# Patient Record
Sex: FEMALE | Race: WHITE | NOT HISPANIC OR LATINO | Employment: FULL TIME | URBAN - METROPOLITAN AREA
[De-identification: names, ages, dates, MRNs, and addresses within clinical notes are randomized per-mention and may not be internally consistent; named-entity substitution may affect disease eponyms.]

---

## 2017-01-31 ENCOUNTER — PRE VISIT (OUTPATIENT)
Dept: UROLOGY | Facility: CLINIC | Age: 52
End: 2017-01-31

## 2017-03-23 ENCOUNTER — OFFICE VISIT (OUTPATIENT)
Dept: UROLOGY | Facility: CLINIC | Age: 52
End: 2017-03-23

## 2017-03-23 VITALS
WEIGHT: 183 LBS | HEART RATE: 83 BPM | SYSTOLIC BLOOD PRESSURE: 120 MMHG | HEIGHT: 64 IN | BODY MASS INDEX: 31.24 KG/M2 | DIASTOLIC BLOOD PRESSURE: 70 MMHG

## 2017-03-23 DIAGNOSIS — N39.46 MIXED INCONTINENCE: Primary | ICD-10-CM

## 2017-03-23 DIAGNOSIS — M62.89 PELVIC FLOOR DYSFUNCTION: ICD-10-CM

## 2017-03-23 ASSESSMENT — PAIN SCALES - GENERAL: PAINLEVEL: NO PAIN (0)

## 2017-03-23 NOTE — LETTER
"3/23/2017       RE: Jena Kamara  [address] [facility] [address]     Dear Colleague,    Thank you for referring your patient, Jena Kamara, to the [facility] UROLOGY AND INST FOR PROSTATE AND UROLOGIC CANCERS at [facility]. Please see a copy of my visit note below.    March 23, 2017    Return visit    Patient returns today for follow up after PT.  She states that there is 75% improvement after the physical therapy.  Pessary does work well when she uses it.  She is really considering a more durable option.  She denies any changes in her health since last visit.    /70  Pulse 83  Ht 1.626 m (5' 4\")  Wt 83 kg (183 lb)  BMI 31.41 kg/m2  She is comfortable, in no distress, non-labored breathing.  Abdomen is soft, non-tender, non-distended.  Normal external female genitalia.  Negative CST.  Speculum and bimanual exam are remarkable for mild myofascial tenderness of the pelvic floor.    A/P: 51 year old F with mixed incontinence s/p PFPT who has some persistent bothersome TRISTAN     We again discussed that there are multiple options for managing her TRISTAN and she is now more interested in surgical intervention. As unable to demonstrate TRISTAN on exam and she is interested in TRISTAN surgery will have her undergo UDS    RTC after UDS for further discussion, sooner if needed    15 minutes were spent with the patient today, > 50% in counseling and coordination of care    Katarina Jaramillo MD MPH   of Urology    CC  Patient Care Team:  [facility] Benavides MD as PCP - General  Reanna Corea PA as Physician Assistant (Physician Assistant)  Bee Madison as Referring Physician (Nurse Practitioner)  Katarina Jaramillo MD as MD (Urology)  REANNA COREA      "

## 2017-03-23 NOTE — PROGRESS NOTES
"March 23, 2017    Return visit    Patient returns today for follow up after PT.  She states that there is 75% improvement after the physical therapy.  Pessary does work well when she uses it.  She is really considering a more durable option.  She denies any changes in her health since last visit.    /70  Pulse 83  Ht 1.626 m (5' 4\")  Wt 83 kg (183 lb)  BMI 31.41 kg/m2  She is comfortable, in no distress, non-labored breathing.  Abdomen is soft, non-tender, non-distended.  Normal external female genitalia.  Negative CST.  Speculum and bimanual exam are remarkable for mild myofascial tenderness of the pelvic floor.    A/P: 51 year old F with mixed incontinence s/p PFPT who has some persistent bothersome TRISTAN     We again discussed that there are multiple options for managing her TRISTAN and she is now more interested in surgical intervention. As unable to demonstrate TRISTAN on exam and she is interested in TRISTAN surgery will have her undergo UDS    RTC after UDS for further discussion, sooner if needed    15 minutes were spent with the patient today, > 50% in counseling and coordination of care    Katarina Jaramillo MD MPH   of Urology    CC  Patient Care Team:  [facility] Md Benavides MD as PCP - General  Reanna Corea PA as Physician Assistant (Physician Assistant)  Bee Madison as Referring Physician (Nurse Practitioner)  Katarina Jaramillo MD as MD (Urology)  REANNA COREA        "

## 2017-03-23 NOTE — MR AVS SNAPSHOT
After Visit Summary   3/23/2017    Jena Kamara    MRN: [id number]           Patient Information     Date Of Birth          1965        Visit Information        Provider Department      3/23/2017 3:45 PM Katarina Jaramillo MD [facility] and [facility] Prostate and Urologic Cancers        Care Instructions    Please do the bladder testing and then return to see us        Follow-ups after your visit        Your next 10 appointments already scheduled     Apr 13, 2017  3:30 PM CDT   (Arrive by 3:15 PM)   Urodynamics with  Uro Procedure Nurse   [facility] and [facility] Urologic C[facility] ([facility])    [address] [place] [address] [phone]            Apr 27, 2017  3:30 PM CDT   (Arrive by 3:15 PM)   Return Visit with Katarina Jaramillo MD   [facility] and [facility] Urologic Cancers ([facility])    [address] [place] [address] [phone]              Who to contact     Please call your clinic at [phone] to:    Ask questions about your health    Make or cancel appointments    Discuss your medicines    Learn about your test results    Speak to your doctor   If you have compliments or concerns about an experience at your clinic, or if you wish to file a complaint, please contact [facility] Patient Relations at [phone] or email us at [email or web address]         Additional Information About Your Visit        Lockboxhart Information     DermLinkt gives you secure access to your electronic health record. If you see a primary care provider, you can also send messages to your care team and make appointments. If you have questions, please call your primary care clinic.  If you do not have a primary care provider, please call [phone] and they will assist you.      [employer] is an electronic gateway that  "provides easy, online access to your medical records. With [employer], you can request a clinic appointment, read your test results, renew a prescription or communicate with your care team.     To access your existing account, please contact your [facility] or call [phone] for assistance.        Care EveryWhere ID     This is your Care EveryWhere ID. This could be used by other organizations to access your [place] medical records  [id number]        Your Vitals Were     Pulse Height BMI (Body Mass Index)             83 1.626 m (5' 4\") 31.41 kg/m2          Blood Pressure from Last 3 Encounters:   03/23/17 120/70   06/16/16 148/87   03/03/16 (!) 160/97    Weight from Last 3 Encounters:   03/23/17 83 kg (183 lb)   06/16/16 82 kg (180 lb 11.2 oz)   03/03/16 81.3 kg (179 lb 3.2 oz)              Today, you had the following     No orders found for display       Primary Care Provider Office Phone # Fax #    [facility] MD Makayla [phone]       [address] [place] [address]        Thank you!     Thank you for choosing [facility] UROLOGY AND [facility] FOR PROSTATE AND UROLOGIC CANCERS  for your care. Our goal is always to provide you with excellent care. Hearing back from our patients is one way we can continue to improve our services. Please take a few minutes to complete the written survey that you may receive in the mail after your visit with us. Thank you!             Your Updated Medication List - Protect others around you: Learn how to safely use, store and throw away your medicines at www.disposemymeds.org.          This list is accurate as of: 3/23/17  4:39 PM.  Always use your most recent med list.                   Brand Name Dispense Instructions for use    CALCIUM + D PO      1 TABLET DAILY       CLARITIN 10 MG tablet   Generic drug:  loratadine      1 TABLET DAILY PRN       fluticasone 50 MCG/ACT spray    FLONASE    1 Package    Spray 2 sprays into both " nostrils daily. Prn       hydrochlorothiazide 25 MG tablet    HYDRODIURIL    90 tablet    Take 1 tablet (25 mg) by mouth daily Due for an appt with Dr Lee,needs labs       Multiple vitamin Tabs      1 TABLET DAILY

## 2017-04-11 ENCOUNTER — PRE VISIT (OUTPATIENT)
Dept: UROLOGY | Facility: CLINIC | Age: 52
End: 2017-04-11

## 2017-04-13 ENCOUNTER — OFFICE VISIT (OUTPATIENT)
Dept: UROLOGY | Facility: CLINIC | Age: 52
End: 2017-04-13

## 2017-04-13 DIAGNOSIS — R32 URINARY INCONTINENCE: Primary | ICD-10-CM

## 2017-04-13 LAB
APPEARANCE UR: CLEAR
BILIRUB UR QL: NORMAL
COLOR UR: YELLOW
GLUCOSE URINE: NORMAL MG/DL
HGB UR QL: NORMAL
KETONES UR QL: NORMAL MG/DL
LEUKOCYTE ESTERASE URINE: NORMAL
NITRITE UR QL STRIP: NORMAL
PH UR STRIP: 5 PH (ref 5–7)
PROTEIN ALBUMIN URINE: NORMAL MG/DL
SOURCE: NORMAL
SP GR UR STRIP: 1.01 (ref 1–1.03)
UROBILINOGEN UR QL STRIP: 0.2 EU/DL (ref 0.2–1)

## 2017-04-13 ASSESSMENT — PAIN SCALES - GENERAL: PAINLEVEL: NO PAIN (0)

## 2017-04-13 NOTE — MR AVS SNAPSHOT
After Visit Summary   4/13/2017    Jena Kamara    MRN: [id number]           Patient Information     Date Of Birth          1965        Visit Information        Provider Department      4/13/2017 3:30 PM Nurse, Uc Uro Procedure [facility] Urology and [facility] for Prostate and Urologic Cancers        Today's Diagnoses     Urinary incontinence    -  1       Follow-ups after your visit        Your next 10 appointments already scheduled     Apr 27, 2017  3:30 PM CDT   (Arrive by 3:15 PM)   Return Visit with Katarina Jaramillo MD   [facility] Urology and [facility] for Prostate and Urologic Cancers ([facility])    [address]  [facility] [address] [phone]              Who to contact     Please call your clinic at [phone] to:    Ask questions about your health    Make or cancel appointments    Discuss your medicines    Learn about your test results    Speak to your doctor   If you have compliments or concerns about an experience at your clinic, or if you wish to file a complaint, please contact [facility] Patient Relations at [phone] or email us at [email or web address]         Additional Information About Your Visit        MyChart Information     Xueersit gives you secure access to your electronic health record. If you see a primary care provider, you can also send messages to your care team and make appointments. If you have questions, please call your primary care clinic.  If you do not have a primary care provider, please call [phone] and they will assist you.      CampusTap is an electronic gateway that provides easy, online access to your medical records. With CampusT[employer], you can request a clinic appointment, read your test results, renew a prescription or communicate with your care team.     To access your existing account, please contact your [facility] or call [phone]  "for assistance.        Care EveryWhere ID     This is your Care EveryWhere ID. This could be used by other organizations to access your [place] medical records  [id number]        Your Vitals Were     Pulse Height BMI (Body Mass Index)             74 1.626 m (5' 4\") 31.22 kg/m2          Blood Pressure from Last 3 Encounters:   04/13/17 (!) 176/102   03/23/17 120/70   06/16/16 148/87    Weight from Last 3 Encounters:   04/13/17 82.5 kg (181 lb 14.4 oz)   03/23/17 83 kg (183 lb)   06/16/16 82 kg (180 lb 11.2 oz)              We Performed the Following     COMPLEX UROFLOWMETRY     CYSTOMETROGRAM COMPLEX     EMG ANAL/URETH SPHINCTER, OTHER THAN NEEDLE     PROCEDURE OTHER - HIM SCAN     Urinalysis chemical screen POCT        Primary Care Provider Office Phone # Fax #    [facility] MD Makayla [phone]       [address] [place] [address]        Thank you!     Thank you for choosing [facility] UROLOGY AND [facility] FOR PROSTATE AND UROLOGIC CANCERS  for your care. Our goal is always to provide you with excellent care. Hearing back from our patients is one way we can continue to improve our services. Please take a few minutes to complete the written survey that you may receive in the mail after your visit with us. Thank you!             Your Updated Medication List - Protect others around you: Learn how to safely use, store and throw away your medicines at www.disposemymeds.org.          This list is accurate as of: 4/13/17 11:59 PM.  Always use your most recent med list.                   Brand Name Dispense Instructions for use    CALCIUM + D PO      1 TABLET DAILY       CLARITIN 10 MG tablet   Generic drug:  loratadine      1 TABLET DAILY PRN       fluticasone 50 MCG/ACT spray    FLONASE    1 Package    Spray 2 sprays into both nostrils daily. Prn       hydrochlorothiazide 25 MG tablet    HYDRODIURIL    90 tablet    Take 1 tablet (25 mg) by mouth daily Due for an appt with Dr Lee,needs labs       " Multiple vitamin Tabs      1 TABLET DAILY

## 2017-04-18 ENCOUNTER — DOCUMENTATION ONLY (OUTPATIENT)
Dept: UROLOGY | Facility: CLINIC | Age: 52
End: 2017-04-18

## 2017-04-18 VITALS
HEART RATE: 74 BPM | WEIGHT: 181.9 LBS | BODY MASS INDEX: 31.05 KG/M2 | DIASTOLIC BLOOD PRESSURE: 102 MMHG | HEIGHT: 64 IN | SYSTOLIC BLOOD PRESSURE: 176 MMHG

## 2017-04-18 NOTE — PROGRESS NOTES
Jena Kamara comes into clinic today at the request of Dr. Jaramillo Ordering Provider for UDS.    This service provided today was under the supervising provider of the day Dr. Mills, who was available if needed.    Constantin Landrum    INDICATIONS FOR PROCEDURE:  Ms. Jena Kamara is a pleasant 51 year old female with mixed incontinence.     PROCEDURE:    1.Complex filling cystourethrogram with measurement of bladder and rectal pressures.  2.Complex voiding cystourethrogram with measurement of bladder and rectal pressures.  3.Electromyography during urodynamics.  4.Uroflowmetry.  5.Sterile urethral catheterization for measurement of postvoid residual urine volume.    VOIDING DIARY:  Voids q 2-6 hours  Average voided amount:240  Episodes of incontinence: 1  Incontinence associated with: walking  Total Volume Intake: 1360 ml  Total Volume Output: 1922 ml    DESCRIPTION OF PROCEDURE:  Risks, benefits, and alternatives were discussed with the patient and they wished to proceed. Urodynamics is planned to better assess the primary etiology for Ms. Kamara's urologic dysfunction. After informed consent was obtained, the patient was taken to the procedure room where uroflowmetry was performed. Findings below. Next a triple-lumen urodynamics catheter was inserted in the bladder. A rectal manometry catheter was placed in the rectum. EMG pads were placed on either side of the anal verge. The bladder was filled with sterile saline at 25 cc/minute and serial pressures were recorded.     PROCEDURE NOTES: 50 ml urine catheterized pre-test.  Urine dipstick was negative for nitrites and leukocytes.  Patient was unable to void with catheters in; they were removed and she was sent to uroflow to void.  No incontinence demonstrated during the study.    UROFLOW:  Voided volume: 164 ml  Maximum flow rate: 23.9 ml/sec  Average flow rate: 12.2 ml/sec  Post void Residual by catheter: 50 ml    DURING THE FILLING PHASE:  At the following volumes the  patient experienced:  First sensation: not reported   First Desire: 229 ml  Strong Desire: 498 ml  Maximum Capacity: 636 ml       DURING THE VOIDING PHASE:  Did not void until sensors out. See below    POST FILLING UROFLOW:  Voided volume: 599 ml  Maximum flow rate: 40.9 ml/sec  Average flow rate: 28.9 ml/sec    Total Instilled: 638  Total Void: 599  Final PVR: 85        A single ciprofloxacin antibiotic was provided for UTI prophylaxis per protocol, following completion of study.         Thank you for allowing me to participate in the care of  Ms. Jenaele Kellerley.  Constantin Landrum LPN

## 2017-04-25 ENCOUNTER — PRE VISIT (OUTPATIENT)
Dept: UROLOGY | Facility: CLINIC | Age: 52
End: 2017-04-25

## 2017-04-25 NOTE — TELEPHONE ENCOUNTER
Patient with a history of stress urinary incontinence and pessary use coming in to review UDS. Chart reviewed and all records available. No need for a call.

## 2017-04-27 ENCOUNTER — OFFICE VISIT (OUTPATIENT)
Dept: UROLOGY | Facility: CLINIC | Age: 52
End: 2017-04-27

## 2017-04-27 VITALS
WEIGHT: 181.9 LBS | BODY MASS INDEX: 31.05 KG/M2 | HEIGHT: 64 IN | HEART RATE: 68 BPM | SYSTOLIC BLOOD PRESSURE: 169 MMHG | DIASTOLIC BLOOD PRESSURE: 97 MMHG

## 2017-04-27 DIAGNOSIS — N39.46 MIXED INCONTINENCE: Primary | ICD-10-CM

## 2017-04-27 ASSESSMENT — PAIN SCALES - GENERAL: PAINLEVEL: NO PAIN (0)

## 2017-04-27 NOTE — NURSING NOTE
"Chief Complaint   Patient presents with     RECHECK     Reviewing UDS for incontinence       Blood pressure (!) 169/97, pulse 68, height 1.626 m (5' 4\"), weight 82.5 kg (181 lb 14.4 oz). Body mass index is 31.22 kg/(m^2).    Patient Active Problem List   Diagnosis     Angioedema     Allergic rhinitis     ROSACEA     Stress fracture of tibia or fibula     Hypertension goal BP (blood pressure) < 140/90     Hyperlipidemia LDL goal <130     HCH       Allergies   Allergen Reactions     Penicillins        Current Outpatient Prescriptions   Medication Sig Dispense Refill     fluticasone (FLONASE) 50 MCG/ACT nasal spray Spray 2 sprays into both nostrils daily. Prn   1 Package 11     MULTIPLE VITAMIN OR TABS 1 TABLET DAILY       CALCIUM + D OR 1 TABLET DAILY       CLARITIN 10 MG OR TABS 1 TABLET DAILY PRN         Social History   Substance Use Topics     Smoking status: Never Smoker     Smokeless tobacco: Never Used     Alcohol use Yes      Comment: 1 drink per month       NATALIE Figueroa  4/27/2017  3:35 PM         "

## 2017-04-27 NOTE — MR AVS SNAPSHOT
After Visit Summary   4/27/2017    Jena Kamara    MRN: [id number]           Patient Information     Date Of Birth          1965        Visit Information        Provider Department      4/27/2017 3:30 PM Katarina Jaramillo MD [facility] Urology and [facility] for Prostate and Urologic Cancers        Today's Diagnoses     Mixed incontinence    -  1      Care Instructions    Continue the pelvic floor exercises as needed    Continue to use the pessary are needed    Return to see us in one year, sooner if needed        Follow-ups after your visit        Who to contact     Please call your clinic at [phone] to:    Ask questions about your health    Make or cancel appointments    Discuss your medicines    Learn about your test results    Speak to your doctor   If you have compliments or concerns about an experience at your clinic, or if you wish to file a complaint, please contact [facility] Patient Relations at [phone] or email us at [email or web address]         Additional Information About Your Visit        Koibanxhart Information     Rohati Systemst gives you secure access to your electronic health record. If you see a primary care provider, you can also send messages to your care team and make appointments. If you have questions, please call your primary care clinic.  If you do not have a primary care provider, please call [phone] and they will assist you.      [employer] is an electronic gateway that provides easy, online access to your medical records. With [employer], you can request a clinic appointment, read your test results, renew a prescription or communicate with your care team.     To access your existing account, please contact your [facility] or call [phone] for assistance.        Care EveryWhere ID     This is your Care EveryWhere ID. This could be used by other organizations to access your [employer]  "records  [id number]        Your Vitals Were     Pulse Height BMI (Body Mass Index)             68 1.626 m (5' 4\") 31.22 kg/m2          Blood Pressure from Last 3 Encounters:   04/27/17 (!) 169/97   04/13/17 (!) 176/102   03/23/17 120/70    Weight from Last 3 Encounters:   04/27/17 82.5 kg (181 lb 14.4 oz)   04/13/17 82.5 kg (181 lb 14.4 oz)   03/23/17 83 kg (183 lb)              Today, you had the following     No orders found for display       Primary Care Provider Office Phone # Fax #    [facility] [phone]       [address] [place] [address]        Thank you!     Thank you for choosing [facility] UROLOGY AND [facility] FOR PROSTATE AND UROLOGIC CANCERS  for your care. Our goal is always to provide you with excellent care. Hearing back from our patients is one way we can continue to improve our services. Please take a few minutes to complete the written survey that you may receive in the mail after your visit with us. Thank you!             Your Updated Medication List - Protect others around you: Learn how to safely use, store and throw away your medicines at www.disposemymeds.org.          This list is accurate as of: 4/27/17 11:59 PM.  Always use your most recent med list.                   Brand Name Dispense Instructions for use    CALCIUM + D PO      1 TABLET DAILY       CLARITIN 10 MG tablet   Generic drug:  loratadine      1 TABLET DAILY PRN       fluticasone 50 MCG/ACT spray    FLONASE    1 Package    Spray 2 sprays into both nostrils daily. Prn       Multiple vitamin Tabs      1 TABLET DAILY         "

## 2017-04-27 NOTE — PROGRESS NOTES
"April 27, 2017    Return visit    Patient returns today for follow up.  States that her symptoms of leakage may actually a little better now than when she last saw me.  Still doing her exercises and using her pessary as needed. She denies any changes in her health since last visit.    BP (!) 169/97  Pulse 68  Ht 1.626 m (5' 4\")  Wt 82.5 kg (181 lb 14.4 oz)  BMI 31.22 kg/m2  She is comfortable, in no distress, non-labored breathing.      Urodynamics reviewed:  First desire at 229mL, strong desire at 498mL, residential 636mL.  She has normal bladder compliance.  She does demonstrate any urodynamic stress incontinence, detrusor overactivity, or detrusor overactivity incontinence.  She was unable to void with the sensors in place but after they were removed she was able to urinate with a good flow (Qmax 40.9, Qave 28.9) and PVR 85mL    A/P: 51 year old F with with mixed incontinence s/p PFPT who has some persistent bothersome TRISTAN     Discussed that as at this time we have not been able to demonstrate TRISTAN on exam or USI on urodynamics, that we would need to find a way to demonstrate transurethral urine loss before heading to the OR for any definitive sling surgery    At this time patient states that the symptoms are better and she is looking forward to having her first summer off in many years so she will continue with her pessary as needed    She will return to see us in one year, sooner if needed    10 minutes were spent with the patient today, > 50% in counseling and coordination of care    Katarina Jaramillo MD MPH   of Urology    CC  Patient Care Team:  [facility] Md Makayla, MD as PCP - General  Reanna Corea PA as Physician Assistant (Physician Assistant)  Bee Madison as Referring Physician (Nurse Practitioner)  Katarina Jaramillo MD as MD (Urology)  ESTABLISHED PATIENT              "

## 2017-04-27 NOTE — PATIENT INSTRUCTIONS
Continue the pelvic floor exercises as needed    Continue to use the pessary are needed    Return to see us in one year, sooner if needed

## 2017-04-27 NOTE — LETTER
"4/27/2017       RE: Jena Kamara  [address] [facility] [address]     Dear Colleague,    Thank you for referring your patient, Jena Kamara, to the [facility] UROLOGY AND INST FOR PROSTATE AND UROLOGIC CANCERS at [facility]. Please see a copy of my visit note below.    April 27, 2017    Return visit    Patient returns today for follow up.  States that her symptoms of leakage may actually a little better now than when she last saw me.  Still doing her exercises and using her pessary as needed. She denies any changes in her health since last visit.    BP (!) 169/97  Pulse 68  Ht 1.626 m (5' 4\")  Wt 82.5 kg (181 lb 14.4 oz)  BMI 31.22 kg/m2  She is comfortable, in no distress, non-labored breathing.      Urodynamics reviewed:  First desire at 229mL, strong desire at 498mL, long term 636mL.  She has normal bladder compliance.  She does demonstrate any urodynamic stress incontinence, detrusor overactivity, or detrusor overactivity incontinence.  She was unable to void with the sensors in place but after they were removed she was able to urinate with a good flow (Qmax 40.9, Qave 28.9) and PVR 85mL    A/P: 51 year old F with with mixed incontinence s/p PFPT who has some persistent bothersome TRISTAN     Discussed that as at this time we have not been able to demonstrate TRISTAN on exam or USI on urodynamics, that we would need to find a way to demonstrate transurethral urine loss before heading to the OR for any definitive sling surgery    At this time patient states that the symptoms are better and she is looking forward to having her first summer off in many years so she will continue with her pessary as needed    She will return to see us in one year, sooner if needed    10 minutes were spent with the patient today, > 50% in counseling and coordination of care    Katarina Jaramillo MD MPH   of Urology    CC  Patient Care Team:  [facility] Md Makayla, MD as PCP - " General  Reanna Corea PA as Physician Assistant (Physician Assistant)  Bee Madison as Referring Physician (Nurse Practitioner)  Katarina Jaramillo MD as MD (Urology)  ESTABLISHED PATIENT

## 2017-07-15 ENCOUNTER — HEALTH MAINTENANCE LETTER (OUTPATIENT)
Age: 52
End: 2017-07-15

## 2017-10-25 ENCOUNTER — OFFICE VISIT (OUTPATIENT)
Dept: FAMILY MEDICINE | Facility: CLINIC | Age: 52
End: 2017-10-25
Payer: COMMERCIAL

## 2017-10-25 VITALS
WEIGHT: 176.2 LBS | OXYGEN SATURATION: 99 % | DIASTOLIC BLOOD PRESSURE: 87 MMHG | HEIGHT: 64 IN | TEMPERATURE: 97.7 F | BODY MASS INDEX: 30.08 KG/M2 | HEART RATE: 70 BPM | SYSTOLIC BLOOD PRESSURE: 169 MMHG

## 2017-10-25 DIAGNOSIS — I10 HYPERTENSION GOAL BP (BLOOD PRESSURE) < 140/90: Primary | ICD-10-CM

## 2017-10-25 PROCEDURE — 99213 OFFICE O/P EST LOW 20 MIN: CPT | Performed by: PHYSICIAN ASSISTANT

## 2017-10-25 RX ORDER — HYDROCHLOROTHIAZIDE 12.5 MG/1
12.5 CAPSULE ORAL DAILY
COMMUNITY
End: 2017-11-24 | Stop reason: ALTCHOICE

## 2017-10-25 NOTE — NURSING NOTE
"Chief Complaint   Patient presents with     Hypertension       Initial LMP 02/24/2017 (Exact Date) Estimated body mass index is 23.49 kg/(m^2) as calculated from the following:    Height as of 4/4/17: 5' 7\" (1.702 m).    Weight as of 4/4/17: 150 lb (68 kg).  Medication Reconciliation: complete     Hanane Pina CMA   "

## 2017-10-25 NOTE — MR AVS SNAPSHOT
After Visit Summary   10/25/2017    Jena Kamara    MRN: [id number]           Patient Information     Date Of Birth          1965        Visit Information        Provider Department      10/25/2017 3:40 PM Dennise Terrazas PA-C [facility] Hypertension Study Summary     Thank you for your interest in the [facility] hypertension research study. This study is designed to test whether genetically guided blood pressure medication management is better than the standard of care.  Both groups will use medications that have been used for many years to treat high blood pressure ( diuretics, beta blockers, calcium channel blockers, AceI /ARB).  The ORDER of medications chosen may be different however. All participants will receive the genetic testing for free along with free research related visits.  Participants will not be given the results of their genetic test results until the END of the study.Participants will also receive compensation totaling about $100 for completing all study visits and surveys.      If you would like to enroll or know more about using your genetics to determine which hypertensive medications may work best for you please contact the research coordinator as [phone] or email [email or web address]    Who may qualify for the study:  1) New diagnosis of high blood pressure but not yet on blood pressure medication.  2) Existing diagnosis of hypertension but blood pressure is  uncontrolled with 1 or less class of medications.   3) Age between 30 and 70  4) BMI between 19-50    Who should NOT be in the study:    1) All patient taking more than 1 class of hypertensive medication are excluded.   2) Documented instance of following conditions    A. Cardiac Disease  i. ICD-10 Code for Coronary Artery Disease - CAD: 410.* -414.*, I25.*  ii. ICD-10 Code for Heart Failure - 428.*, I50.*  iii. ICD-10 Code for Congenital Cardiac Disease -  745.*, -747.*, Q20.*, -Q28.*   B. Kidney Disease        i. ICD-10 Code for Chronic Kidney Disease - CKD:ICD9 585.*and ICD10 N18.*   C. Vascular Disease        i. ICD-10 Code for Peripheral Vascular Disease - 443.9, I73.9        ii. ICD-10 Code for Pulmonary Hypertension - 416.0, 416.8, I27.0, I27.2   D. Secondary Hypertension                     i. ICD-10 Code for Hypertension Secondary to Other Diseases - I15.0-2, I15.8-9  3) Any patient who has chronic medical conditions that  their doctor/provider feels would make them unsafe to participate in a research study where initial choice of blood pressure  medication could be from 1 of these 4 BP classes of medicines: diuretics, beta blockers, calcium channel blockers, AceI /ARB.    Study visit occur at the following clinic locations  North:  1) [place]  2) [place]  3) [place]  4) [place]  5) [place]  6) [place]  7) [place]  8) [place]    South:  1) [facility])  2) [employer]  [facility]) [facility]  1) [place]    Patient Expectations:    1) Take Hypertension medications  2) Attend scheduled study visits  3) Complete online surveys sent between visits     If enrolled patient is asked to attend 5 to 8 study visits over the next 12 months.                    Follow-ups after your visit        Who to contact     Normal or non-critical lab and imaging results will be communicated to you by [facility], letter or phone within 4 business days after the clinic has received the results. If you do not hear from us within 7 days, please contact the clinic through MyChart or phone. If you have a critical or abnormal lab result, we will notify you by phone as soon as possible.  Submit refill requests through [employer] or call your pharmacy and they will forward the refill request to us. Please allow 3 business days for your refill to be completed.          If you need to speak with a  for additional information , please call:  "[phone]           Additional Information About Your Visit        MyChart Information     [facility] [employer] gives you secure access to your electronic health record. If you see a primary care provider, you can also send messages to your care team and make appointments. If you have questions, please call your primary care clinic.  If you do not have a primary care provider, please call [phone] and they will assist you.        Care EveryWhere ID     This is your Care EveryWhere ID. This could be used by other organizations to access your [place] medical records  [id number]        Your Vitals Were     Pulse Temperature Height Last Period Pulse Oximetry BMI (Body Mass Index)    70 97.7  F (36.5  C) (Tympanic) 5' 4\" (1.626 m) 10/20/2017 99% 30.24 kg/m2       Blood Pressure from Last 3 Encounters:   10/25/17 169/87   04/27/17 (!) 169/97   04/13/17 (!) 176/102    Weight from Last 3 Encounters:   10/25/17 176 lb 3.2 oz (79.9 kg)   04/27/17 181 lb 14.4 oz (82.5 kg)   04/13/17 181 lb 14.4 oz (82.5 kg)              Today, you had the following     No orders found for display       Primary Care Provider Office Phone # Fax #    [facility] [phone] [address] [facility] [address]        Equal Access to Services     ART SOLORZANO AH: Hadii ivet ku hadasho Soomaali, waaxda luqadaha, qaybta kaalmada adeegyada, pascale castillo. So [facility] [phone].    ATENCIÓN: Si habla español, tiene a hickman disposición servicios gratuitos de asistencia lingüística. Llame al [phone].    We comply with applicable federal civil rights laws and [place] laws. We do not discriminate on the basis of race, color, national origin, age, disability, sex, sexual orientation, or gender identity.            Thank you!     Thank you for choosing [facility]  for your care. Our goal is always to provide you with excellent care. Hearing back from our patients is one way we " can continue to improve our services. Please take a few minutes to complete the written survey that you may receive in the mail after your visit with us. Thank you!             Your Updated Medication List - Protect others around you: Learn how to safely use, store and throw away your medicines at www.disposemymeds.org.          This list is accurate as of: 10/25/17  4:15 PM.  Always use your most recent med list.                   Brand Name Dispense Instructions for use Diagnosis    CALCIUM + D PO      1 TABLET DAILY        CLARITIN 10 MG tablet   Generic drug:  loratadine      1 TABLET DAILY PRN        fluticasone 50 MCG/ACT spray    FLONASE    1 Package    Spray 2 sprays into both nostrils daily. Prn    Allergic rhinitis, cause unspecified       hydrochlorothiazide 12.5 MG capsule    MICROZIDE     Take 12.5 mg by mouth daily        Multiple vitamin Tabs      1 TABLET DAILY

## 2017-10-25 NOTE — PATIENT INSTRUCTIONS
[facility] Hypertension Study Summary     Thank you for your interest in the [facility] hypertension research study. This study is designed to test whether genetically guided blood pressure medication management is better than the standard of care.  Both groups will use medications that have been used for many years to treat high blood pressure ( diuretics, beta blockers, calcium channel blockers, AceI /ARB).  The ORDER of medications chosen may be different however. All participants will receive the genetic testing for free along with free research related visits.  Participants will not be given the results of their genetic test results until the END of the study.Participants will also receive compensation totaling about $100 for completing all study visits and surveys.      If you would like to enroll or know more about using your genetics to determine which hypertensive medications may work best for you please contact the research coordinator as [phone] or email [email or web address]    Who may qualify for the study:  1) New diagnosis of high blood pressure but not yet on blood pressure medication.  2) Existing diagnosis of hypertension but blood pressure is  uncontrolled with 1 or less class of medications.   3) Age between 30 and 70  4) BMI between 19-50    Who should NOT be in the study:    1) All patient taking more than 1 class of hypertensive medication are excluded.   2) Documented instance of following conditions    A. Cardiac Disease  i. ICD-10 Code for Coronary Artery Disease - CAD: 410.* -414.*, I25.*  ii. ICD-10 Code for Heart Failure - 428.*, I50.*  iii. ICD-10 Code for Congenital Cardiac Disease - 745.*, -747.*, Q20.*, -Q28.*   B. Kidney Disease        i. ICD-10 Code for Chronic Kidney Disease - CKD:ICD9 585.*and ICD10 N18.*   C. Vascular Disease        i. ICD-10 Code for Peripheral Vascular Disease - 443.9, I73.9        ii. ICD-10 Code for Pulmonary Hypertension - 416.0, 416.8, I27.0, I27.2   D.  Secondary Hypertension                     i. ICD-10 Code for Hypertension Secondary to Other Diseases - I15.0-2, I15.8-9  3) Any patient who has chronic medical conditions that  their doctor/provider feels would make them unsafe to participate in a research study where initial choice of blood pressure  medication could be from 1 of these 4 BP classes of medicines: diuretics, beta blockers, calcium channel blockers, AceI /ARB.    Study visit occur at the following clinic locations  North:  1) [place]  2) [employer]  3) [place]  4) [place]  5) [place]  6) [place]  7) [place]  8) [place]    [place]:  1) [facility])  2) [place]  [facility]  1) [place]    Patient Expectations:    1) Take Hypertension medications  2) Attend scheduled study visits  3) Complete online surveys sent between visits     If enrolled patient is asked to attend 5 to 8 study visits over the next 12 months.

## 2017-10-25 NOTE — PROGRESS NOTES
SUBJECTIVE:   Jena Kamara is a 52 year old female who presents to clinic today for the following health issues:    Hypertension Follow-up      Outpatient blood pressures are being checked at home.  Results are 116/73 - 168/113.    Low Salt Diet: no added salt    Amount of exercise or physical activity: 6-7 days/week for an average of greater than 60 minutes    Problems taking medications regularly: No    Medication side effects: none    Diet: regular (no restrictions)      She denies any chest pain or SOB.  No changes in vision.  No headache (has some head congestion).     Problem list and histories reviewed & adjusted, as indicated.  Additional history: as documented    Patient Active Problem List   Diagnosis     Angioedema     Allergic rhinitis     ROSACEA     Stress fracture of tibia or fibula     Hypertension goal BP (blood pressure) < 140/90     Hyperlipidemia LDL goal <130     HCH     Past Surgical History:   Procedure Laterality Date     SURGICAL HISTORY OF -       Sinus Surgery     SURGICAL HISTORY OF -              Social History   Substance Use Topics     Smoking status: Never Smoker     Smokeless tobacco: Never Used     Alcohol use Yes      Comment: 1 drink per month     Family History   Problem Relation Age of Onset     CEREBROVASCULAR DISEASE Mother      age 49     Hypertension Mother      Lipids Father      Respiratory Father      COPD     Eye Disorder Father      Alzheimer Disease Father      age 76     Depression Maternal Grandmother      suicide     CEREBROVASCULAR DISEASE Maternal Grandfather      Respiratory Paternal Grandfather      Eye Disorder Sister      optical atrophy     DIABETES No family hx of      C.A.D. No family hx of      Breast Cancer No family hx of      Cancer - colorectal No family hx of      Prostate Cancer No family hx of          Current Outpatient Prescriptions   Medication Sig Dispense Refill     hydrochlorothiazide (MICROZIDE) 12.5 MG capsule Take 12.5  "mg by mouth daily       fluticasone (FLONASE) 50 MCG/ACT nasal spray Spray 2 sprays into both nostrils daily. Prn   1 Package 11     MULTIPLE VITAMIN OR TABS 1 TABLET DAILY       CALCIUM + D OR 1 TABLET DAILY       CLARITIN 10 MG OR TABS 1 TABLET DAILY PRN       BP Readings from Last 3 Encounters:   10/25/17 169/87   04/27/17 (!) 169/97   04/13/17 (!) 176/102    Wt Readings from Last 3 Encounters:   10/25/17 176 lb 3.2 oz (79.9 kg)   04/27/17 181 lb 14.4 oz (82.5 kg)   04/13/17 181 lb 14.4 oz (82.5 kg)                        Reviewed and updated as needed this visit by clinical staff     Reviewed and updated as needed this visit by Provider         ROS:  Constitutional, HEENT, cardiovascular, pulmonary, gi and gu systems are negative, except as otherwise noted.      OBJECTIVE:   /87  Pulse 70  Temp 97.7  F (36.5  C) (Tympanic)  Ht 5' 4\" (1.626 m)  Wt 176 lb 3.2 oz (79.9 kg)  LMP 10/20/2017  SpO2 99%  BMI 30.24 kg/m2  Body mass index is 30.24 kg/(m^2).  GENERAL: healthy, alert and no distress  NECK: no adenopathy, no asymmetry, masses, or scars and thyroid normal to palpation  RESP: lungs clear to auscultation - no rales, rhonchi or wheezes  CV: regular rate and rhythm, normal S1 S2, no S3 or S4, no murmur, click or rub, no peripheral edema and peripheral pulses strong  ABDOMEN: soft, nontender, no hepatosplenomegaly, no masses and bowel sounds normal  MS: no gross musculoskeletal defects noted, no edema    Diagnostic Test Results:  none     ASSESSMENT/PLAN:             1. Hypertension goal BP (blood pressure) < 140/90  Uncontrolled, needs improvement.  Discussed risks of uncontrolled HTN and importance of monitoring this closely.  Discussed lifestyle changes to help bring down BP, specifically she will work on limiting salt intake (mainly through eating out).      We discussed [facility] HTN study through [facility]  and she is interested in enrolling (would like to stay at [place]). Unable to consent over " the phone today, therefore will forward chart along to study coordinators and they will consent and schedule the first visit.     Jena restarted hydrochlorothiazide that she was on in the past (for about 1 week now), therefore she would be in the uncontrolled group and will need to wash out for 30 days.         Dennise Terrazas PA-C  [facility]

## 2017-10-30 ENCOUNTER — OFFICE VISIT (OUTPATIENT)
Dept: FAMILY MEDICINE | Facility: CLINIC | Age: 52
End: 2017-10-30
Payer: COMMERCIAL

## 2017-10-30 VITALS
HEIGHT: 64 IN | TEMPERATURE: 97.7 F | HEART RATE: 65 BPM | DIASTOLIC BLOOD PRESSURE: 90 MMHG | BODY MASS INDEX: 30.08 KG/M2 | SYSTOLIC BLOOD PRESSURE: 165 MMHG | WEIGHT: 176.2 LBS

## 2017-10-30 DIAGNOSIS — I10 HYPERTENSION GOAL BP (BLOOD PRESSURE) < 140/90: Primary | ICD-10-CM

## 2017-10-30 PROCEDURE — 80048 BASIC METABOLIC PNL TOTAL CA: CPT | Performed by: PHYSICIAN ASSISTANT

## 2017-10-30 PROCEDURE — 99214 OFFICE O/P EST MOD 30 MIN: CPT | Performed by: PHYSICIAN ASSISTANT

## 2017-10-30 PROCEDURE — 82043 UR ALBUMIN QUANTITATIVE: CPT | Performed by: PHYSICIAN ASSISTANT

## 2017-10-30 PROCEDURE — 36415 COLL VENOUS BLD VENIPUNCTURE: CPT | Performed by: PHYSICIAN ASSISTANT

## 2017-10-30 NOTE — MR AVS SNAPSHOT
"              After Visit Summary   10/30/2017    Jena Kamara    MRN: [id number]           Patient Information     Date Of Birth          1965        Visit Information        Provider Department      10/30/2017 3:20 PM Dennise Terrazas PA-C [facility]        Today's Diagnoses     Hypertension goal BP (blood pressure) < 140/90    -  1      Care Instructions    [facility] Hypertension Study   Visit 1     Thank you for your interest in the [facility] hypertension research study.    At the visit today we swabbed your cheeks to obtain a genetic test that will be used to guide your blood pressure treatment.      The research protocol requires that a patient currently on 1 blood pressure medicine stop or taper off of their blood pressure medication before starting genetically guided treatment.This is called a \"washout period\" and allows your body time to remove this medicine.  This process has been safely done in prior blood pressure studies.  You should have been given instructions on how to do this today.  If you still have questions please contact the research coordinator at [phone].    In the coming days you will be contacted by a research team member to schedule your next office visit.  After it is scheduled, be sure to let the research coordinator know as soon as possible if you cannot make it so that the visit can be rescheduled for you.     As a participant in the [facility] for Hypertension Study you will be asked to use a blood pressure cuff for the first 30 days of your study participation to see how your blood pressure is when you are off of your blood pressure medication. You are being asked to wear the blood pressure cuff to measure your blood pressure twice daily. Please ensure that measurements are taken in the morning after a five minute resting period and before exercising, eating, or consuming alcohol or caffeine products. Wait at least 30 minutes after showering before taking " measurement.    You will be asked to enter these daily blood pressure measurements onto a paper log. At the end of each week, you will need to enter your blood pressure values into the online blood pressure log provided by [employer] via  email link. If you are completing the surveys on paper, please return the paper blood pressure log to your clinic during your second study visit.     **IMPORTANT** If during these 30 days you record a systolic reading of 170  or higher or a diastolic reading of 110 or higher for one of your two blood pressure readings, wait 2-3 minutes and take a third blood pressure measurement. We ask that you then call the [employer] 24/7 triage system at [phone] if you record two high blood pressure readings. In addition, please contact the [employer] triage system if you experience any symptoms that may be related to hypertension.     If you record blood pressure at this level and also experience symptoms such  as chest pain, shortness of breath, numbness/weakness, change in vision or difficulty speaking call 911      In four weeks, your hypertension medications will be prescribed via phone or through [employer].     If you have any questions or concerns about the study please contact the research coordinator at [phone]. If your phone number, email or address changes please alert the research coordinator.    In the meantime, we ask that you complete the online surveys emailed out to  you, if completing online, or mailed out to you, if completing paper surveys,  before your next visit.            Follow-ups after your visit        Who to contact     Normal or non-critical lab and imaging results will be communicated to you by MyChart, letter or phone within 4 business days after the clinic has received the results. If you do not hear from us within 7 days, please contact the clinic through [employer] or phone. If you have a critical or abnormal lab result, we will notify you by phone as soon as  "possible.  Submit refill requests through [employer] or call your pharmacy and they will forward the refill request to us. Please allow 3 business days for your refill to be completed.          If you need to speak with a  for additional information , please call: [phone]           Additional Information About Your Visit        [email or web address] Information     [employer] gives you secure access to your electronic health record. If you see a primary care provider, you can also send messages to your care team and make appointments. If you have questions, please call your primary care clinic.  If you do not have a primary care provider, please call [phone] and they will assist you.        Care EveryWhere ID     This is your Care EveryWhere ID. This could be used by other organizations to access your [place] medical records  [id number]        Your Vitals Were     Pulse Temperature Height Last Period Breastfeeding? BMI (Body Mass Index)    65 97.7  F (36.5  C) (Tympanic) 5' 4.21\" (1.631 m) 10/20/2017 (Approximate) No 30.04 kg/m2       Blood Pressure from Last 3 Encounters:   10/30/17 165/90   10/25/17 169/87   04/27/17 (!) 169/97    Weight from Last 3 Encounters:   10/30/17 176 lb 3.2 oz (79.9 kg)   10/25/17 176 lb 3.2 oz (79.9 kg)   04/27/17 181 lb 14.4 oz (82.5 kg)              We Performed the Following     Albumin Random Urine Quantitative with Creat Ratio     Basic metabolic panel        Primary Care Provider Office Phone # Fax #    [facility] [phone] [address] [facility] [address]        Equal Access to Services     ART SOLORZANO AH: Hadii ivet beavers Soanca, waaxda luqadaha, qaybta kaalmada pascale bowers. So [facility] [phone].    ATENCIÓN: Si habla español, tiene a hickman disposición servicios gratuitos de asistencia lingüística. Venkat al [phone].    We comply with applicable federal civil rights laws and " [place] laws. We do not discriminate on the basis of race, color, national origin, age, disability, sex, sexual orientation, or gender identity.            Thank you!     Thank you for choosing [facility]  for your care. Our goal is always to provide you with excellent care. Hearing back from our patients is one way we can continue to improve our services. Please take a few minutes to complete the written survey that you may receive in the mail after your visit with us. Thank you!             Your Updated Medication List - Protect others around you: Learn how to safely use, store and throw away your medicines at www.disposemymeds.org.          This list is accurate as of: 10/30/17  3:48 PM.  Always use your most recent med list.                   Brand Name Dispense Instructions for use Diagnosis    CALCIUM + D PO      1 TABLET DAILY        CLARITIN 10 MG tablet   Generic drug:  loratadine      1 TABLET DAILY PRN        fluticasone 50 MCG/ACT spray    FLONASE    1 Package    Spray 2 sprays into both nostrils daily. Prn    Allergic rhinitis, cause unspecified       hydrochlorothiazide 12.5 MG capsule    MICROZIDE     Take 12.5 mg by mouth daily        Multiple vitamin Tabs      1 TABLET DAILY

## 2017-10-30 NOTE — LETTER
November 6, 2017      Jena Kamara  [address] [facility] [place] [address]        Dear ,    We are writing to inform you of your test results.    Your chemistry, blood sugar and kidney function tests were normal.    Resulted Orders   Basic metabolic panel   Result Value Ref Range    Sodium 139 133 - 144 mmol/L    Potassium 3.4 3.4 - 5.3 mmol/L    Chloride 103 94 - 109 mmol/L    Carbon Dioxide 28 20 - 32 mmol/L    Anion Gap 8 3 - 14 mmol/L    Glucose 66 (L) 70 - 99 mg/dL    Urea Nitrogen 12 7 - 30 mg/dL    Creatinine 0.68 0.52 - 1.04 mg/dL    GFR Estimate >90 >60 mL/min/1.7m2      Comment:      Non  GFR Calc    GFR Estimate If Black >90 >60 mL/min/1.7m2      Comment:       GFR Calc    Calcium 8.9 8.5 - 10.1 mg/dL   Albumin Random Urine Quantitative with Creat Ratio   Result Value Ref Range    Creatinine Urine 29 mg/dL    Albumin Urine mg/L <5 mg/L    Albumin Urine mg/g Cr Unable to calculate due to low value 0 - 25 mg/g Cr     If you have any questions or concerns, please call the clinic at the number listed above.       Sincerely,      Dennise Terrazas PA-C/am

## 2017-10-30 NOTE — PROGRESS NOTES
"PGEN study visit :  Uncontrolled hypertension arm , first visit      SUBJECTIVE:  Patient is here for first PGEN research study visit. Please refer to research tab in the header for further details. Patient has uncontrolled hypertension and has a goal of 140/90 mmHg  (per Problem list target chosen by PCP)     PCP note reviewed.  Current blood pressure medication is : hydrochlorothiazide 12.5 mg     Current diet & lifestyle: eating out about 2-3 times per week.    Smoking: no  Alcohol consumption social (1-3 beers every few weeks)  Other pertinent history none     LMP 10/20/2017  There is no height or weight on file to calculate BMI.    Estimated body mass index is 30.24 kg/(m^2) as calculated from the following:    Height as of 10/25/17: 5' 4\" (1.626 m).    Weight as of 10/25/17: 176 lb 3.2 oz (79.9 kg).      Current Outpatient Prescriptions on File Prior to Visit:  hydrochlorothiazide (MICROZIDE) 12.5 MG capsule Take 12.5 mg by mouth daily   fluticasone (FLONASE) 50 MCG/ACT nasal spray Spray 2 sprays into both nostrils daily. Prn   MULTIPLE VITAMIN OR TABS 1 TABLET DAILY   CALCIUM + D OR 1 TABLET DAILY   CLARITIN 10 MG OR TABS 1 TABLET DAILY PRN     No current facility-administered medications on file prior to visit.     Last Basic Metabolic Panel:  Lab Results   Component Value Date     11/23/2012      Lab Results   Component Value Date    POTASSIUM 3.9 11/23/2012     Lab Results   Component Value Date    CHLORIDE 102 11/23/2012     Lab Results   Component Value Date    ARTURO 9.2 11/23/2012     Lab Results   Component Value Date    CO2 26 11/23/2012     Lab Results   Component Value Date    BUN 10 11/23/2012     Lab Results   Component Value Date    CR 0.72 11/23/2012     Lab Results   Component Value Date    GLC 99 11/23/2012        A baseline potassium, creatinine, BUN, GFR has been done within past 12 months    Arm circumf: 12 in    OBJECTIVE:  Patient in in no apparent distress and able to provide full " "history for today's encounter. she  denies pain or any current illness   LMP 10/20/2017  There is no height or weight on file to calculate BMI.  Estimated body mass index is 30.24 kg/(m^2) as calculated from the following:    Height as of 10/25/17: 5' 4\" (1.626 m).    Weight as of 10/25/17: 176 lb 3.2 oz (79.9 kg).    Today's BP completed using cuff size: regular on left side  arm.    Is pulse 55 or greater? - Yes  Other Exam findings :   Cardiovascular:  Rhythm regular, normal S1 and S2.  No murmur, gallop or clicks noted.   No edema noted in lower extremities.   Respiratory:  Lungs are clear to auscultation bilaterally.  Good air exchange is noted throughout lung fields.         Please pause and complete documentation on [facility] hypertension flowsheet for today's visit as well        ASSESSMENT/PLAN  (I10)  Hypertension goal BP (blood pressure) < 140/90 (primary encounter diagnosis). [facility]  enrollment/first study visit.      Consent obtained and documented and research folder provided to patient today.    Patient consents to washout period of 4 weeks without blood pressure medications: Yes    Cheek swabs (genetic profile test) was obtained and provided to clinic lab today : Yes    Patient should NOT be provided genetic profile results until the end of the study (this is a single blinded study.  Clinicians will use genetic profile (see media tab) results to chose order of blood pressure medications in patients randomized to the intervention arm.   Patients will remain blinded to results until the end of the study)    Full research packet also provide to patient .  Our research team will reach out to patient to schedule follow up visit as well.     Patient was counseled regarding the lifestyle changes (listed below)  to help with BP management Yes  Lifestyle changes that can help control high blood pressure:  Even though PGEN is a study to test effectiveness of genetically guided medications for managing high blood " pressure, there are several things you can do to ensure your blood pressure stays in good control:    Maintain a healthy weight (BMI<26). A modest amount of weight loss can be helpful    Limit salt intake to under 2400mg daily    Follow the DASH diet (lean meats, low salt, whole grains, lots of fruits/vegies)    Stay active, try to get in 30 minutes of exercise daily.    Manage your daily stress.    Do not smoke cigarettes (or cut back)    Limit alcohol (2 drinks/day for men, 1 drink/day for women)  Was AVS  provided to patient with the relevant <dot>PGENPI dot phrase pulled into patient instructions Yes    Patient was given an opportunity to ask questions.  Patient verbalized understanding of this plan and is agreeable to continuing with this research study    Dennise Terrazas PA-C

## 2017-10-30 NOTE — PATIENT INSTRUCTIONS
"[facility] Hypertension Study   Visit 1     Thank you for your interest in the [facility] hypertension research study.    At the visit today we swabbed your cheeks to obtain a genetic test that will be used to guide your blood pressure treatment.      The research protocol requires that a patient currently on 1 blood pressure medicine stop or taper off of their blood pressure medication before starting genetically guided treatment.This is called a \"washout period\" and allows your body time to remove this medicine.  This process has been safely done in prior blood pressure studies.  You should have been given instructions on how to do this today.  If you still have questions please contact the research coordinator at [phone].    In the coming days you will be contacted by a research team member to schedule your next office visit.  After it is scheduled, be sure to let the research coordinator know as soon as possible if you cannot make it so that the visit can be rescheduled for you.     As a participant in the [facility] for Hypertension Study you will be asked to use a blood pressure cuff for the first 30 days of your study participation to see how your blood pressure is when you are off of your blood pressure medication. You are being asked to wear the blood pressure cuff to measure your blood pressure twice daily. Please ensure that measurements are taken in the morning after a five minute resting period and before exercising, eating, or consuming alcohol or caffeine products. Wait at least 30 minutes after showering before taking measurement.    You will be asked to enter these daily blood pressure measurements onto a paper log. At the end of each week, you will need to enter your blood pressure values into the online blood pressure log provided by [email or web address] via  email link. If you are completing the surveys on paper, please return the paper blood pressure log to your clinic during your second study visit.     **IMPORTANT** If " during these 30 days you record a systolic reading of 170  or higher or a diastolic reading of 110 or higher for one of your two blood pressure readings, wait 2-3 minutes and take a third blood pressure measurement. We ask that you then call the [facility] 24/7 triage system at [phone] if you record two high blood pressure readings. In addition, please contact the [facility] triage system if you experience any symptoms that may be related to hypertension.     If you record blood pressure at this level and also experience symptoms such  as chest pain, shortness of breath, numbness/weakness, change in vision or difficulty speaking call 911      In four weeks, your hypertension medications will be prescribed via phone or through [employer].     If you have any questions or concerns about the study please contact the research coordinator at [phone]. If your phone number, email or address changes please alert the research coordinator.    In the meantime, we ask that you complete the online surveys emailed out to  you, if completing online, or mailed out to you, if completing paper surveys,  before your next visit.

## 2017-10-30 NOTE — NURSING NOTE
"Chief Complaint   Patient presents with     PGen Study       Initial /90  Pulse 65  Temp 97.7  F (36.5  C) (Tympanic)  Ht 5' 4.21\" (1.631 m)  Wt 176 lb 3.2 oz (79.9 kg)  LMP 10/20/2017 (Approximate)  Breastfeeding? No  BMI 30.04 kg/m2 Estimated body mass index is 30.04 kg/(m^2) as calculated from the following:    Height as of this encounter: 5' 4.21\" (1.631 m).    Weight as of this encounter: 176 lb 3.2 oz (79.9 kg).   Waist Circumference:  39.5in   Medication Reconciliation: complete     Deirdre Segura MA      "

## 2017-10-31 LAB
ANION GAP SERPL CALCULATED.3IONS-SCNC: 8 MMOL/L (ref 3–14)
BUN SERPL-MCNC: 12 MG/DL (ref 7–30)
CALCIUM SERPL-MCNC: 8.9 MG/DL (ref 8.5–10.1)
CHLORIDE SERPL-SCNC: 103 MMOL/L (ref 94–109)
CO2 SERPL-SCNC: 28 MMOL/L (ref 20–32)
CREAT SERPL-MCNC: 0.68 MG/DL (ref 0.52–1.04)
CREAT UR-MCNC: 29 MG/DL
GFR SERPL CREATININE-BSD FRML MDRD: >90 ML/MIN/1.7M2
GLUCOSE SERPL-MCNC: 66 MG/DL (ref 70–99)
MICROALBUMIN UR-MCNC: <5 MG/L
MICROALBUMIN/CREAT UR: NORMAL MG/G CR (ref 0–25)
POTASSIUM SERPL-SCNC: 3.4 MMOL/L (ref 3.4–5.3)
SODIUM SERPL-SCNC: 139 MMOL/L (ref 133–144)

## 2017-11-15 ENCOUNTER — TRANSFERRED RECORDS (OUTPATIENT)
Dept: HEALTH INFORMATION MANAGEMENT | Facility: CLINIC | Age: 52
End: 2017-11-15

## 2017-11-24 ENCOUNTER — TELEPHONE (OUTPATIENT)
Dept: FAMILY MEDICINE | Facility: CLINIC | Age: 52
End: 2017-11-24

## 2017-11-24 DIAGNOSIS — I10 HYPERTENSION GOAL BP (BLOOD PRESSURE) < 140/90: ICD-10-CM

## 2017-11-24 RX ORDER — CHLORTHALIDONE 25 MG/1
12.5 TABLET ORAL DAILY
Qty: 45 TABLET | Refills: 1 | Status: SHIPPED | OUTPATIENT
Start: 2017-11-24 | End: 2017-12-11

## 2017-11-24 RX ORDER — CHLORTHALIDONE 25 MG/1
12.5 TABLET ORAL DAILY
Qty: 45 TABLET | Refills: 1 | Status: SHIPPED | OUTPATIENT
Start: 2017-11-24 | End: 2017-11-24

## 2017-11-24 NOTE — TELEPHONE ENCOUNTER
I sent Rx to her preferred pharmacy [facility].  Order to [facility] has been cancelled  See orders  Stephany Huang MD

## 2017-11-24 NOTE — TELEPHONE ENCOUNTER
Please contact patient and advised I have sent prescription to [facility] pharmacy prescription based on research protocol'.  Please verify this is the correct pharmacy for patient.   Prescription is for chlorthalidone , a diuretic to be taken daily.  It is generally well tolerated and may cause increased urination .  Please advise us if any unusual side effects develop.  Please arrange for PGEN research team  Follow-up per protocol also (2 wks after starting medication )  Stephany Huang MD

## 2017-12-11 ENCOUNTER — OFFICE VISIT (OUTPATIENT)
Dept: FAMILY MEDICINE | Facility: CLINIC | Age: 52
End: 2017-12-11

## 2017-12-11 VITALS
TEMPERATURE: 97.8 F | SYSTOLIC BLOOD PRESSURE: 145 MMHG | DIASTOLIC BLOOD PRESSURE: 87 MMHG | WEIGHT: 180 LBS | HEART RATE: 75 BPM | HEIGHT: 64 IN | BODY MASS INDEX: 30.73 KG/M2

## 2017-12-11 DIAGNOSIS — I10 HYPERTENSION GOAL BP (BLOOD PRESSURE) < 140/90: Primary | ICD-10-CM

## 2017-12-11 PROCEDURE — 99213 OFFICE O/P EST LOW 20 MIN: CPT | Performed by: PHYSICIAN ASSISTANT

## 2017-12-11 RX ORDER — CHLORTHALIDONE 25 MG/1
25 TABLET ORAL DAILY
Qty: 90 TABLET | Refills: 1 | Status: SHIPPED | OUTPATIENT
Start: 2017-12-11 | End: 2018-02-05

## 2017-12-11 NOTE — MR AVS SNAPSHOT
After Visit Summary   12/11/2017    Jena Kamara    MRN: [id number]           Patient Information     Date Of Birth          1965        Visit Information        Provider Department      12/11/2017 3:40 PM Dennise Terrazas PA-C [facility] [address]        Today's Diagnoses     Hypertension goal BP (blood pressure) < 140/90    -  1      Care Instructions    PGen Hypertension Study  Visit 2     Thank you for your continued participation in the hypertension study!  Please continue taking your hypertension medications as directed. Your next visit will be in four weeks and will be scheduled for you in the coming days. After it is scheduled, be sure to let the research coordinator know as soon as possible if you cannot make it so that the visit can be rescheduled for you.     Please contact the research coordinator if you receive care for your hypertension outside of your study visits.    If you have any questions, please contact the research coordinator at [phone]. If you experience any symptoms please call the [facility] 24/7 triage number at [phone]. If your phone number, email or address changes please alert the research coordinator.     In the meantime, we ask that you complete the online surveys emailed out to you, if completing online, or mailed out to you, if completing paper surveys, before your next visit.            Follow-ups after your visit        Your next 10 appointments already scheduled     Jan 08, 2018  3:40 PM CST   Office Visit with Dennise Terrazas PA-C   [facility] [address] ([facility] [address])    [address] [id number] [phone]           Bring a current list of meds and any records pertaining to this visit. For Physicals, please bring immunization records and any forms needing to be filled out. Please arrive 10 minutes early to complete paperwork.            Feb 05, 2018  3:40 PM CST   Office Visit with Dennise  Carly Terrazas PA-C   [facility]    [address] [address] [phone]           Bring a current list of meds and any records pertaining to this visit. For Physicals, please bring immunization records and any forms needing to be filled out. Please arrive 10 minutes early to complete paperwork.            Mar 19, 2018  3:40 PM CDT   Office Visit with Dennise Terrazas PA-C   [facility] ([facility])    [address] [facility] [address] [phone]           Bring a current list of meds and any records pertaining to this visit. For Physicals, please bring immunization records and any forms needing to be filled out. Please arrive 10 minutes early to complete paperwork.            Apr 30, 2018  3:40 PM CDT   Office Visit with Dennise Terrazas PA-C   [facility] ([facility])    [address] [facility] [address] [phone]           Bring a current list of meds and any records pertaining to this visit. For Physicals, please bring immunization records and any forms needing to be filled out. Please arrive 10 minutes early to complete paperwork.            Jul 23, 2018  3:40 PM CDT   Office Visit with Dennise Terrazas PA-C   [facility]    [address] [facility] [address] [phone]           Bring a current list of meds and any records pertaining to this visit. For Physicals, please bring immunization records and any forms needing to be filled out. Please arrive 10 minutes early to complete paperwork.            Nov 12, 2018  3:40 PM CST   Office Visit with Dennise Terrazas PA-C   [facility]    [address] [facility] [address] [phone]           Bring a current list of meds and any records pertaining to this visit. For  "Physicals, please bring immunization records and any forms needing to be filled out. Please arrive 10 minutes early to complete paperwork.              Who to contact     Normal or non-critical lab and imaging results will be communicated to you by [employer], letter or phone within 4 business days after the clinic has received the results. If you do not hear from us within 7 days, please contact the clinic through [employer] or phone. If you have a critical or abnormal lab result, we will notify you by phone as soon as possible.  Submit refill requests through [employer] or call your pharmacy and they will forward the refill request to us. Please allow 3 business days for your refill to be completed.          If you need to speak with a  for additional information , please call: [phone]           Additional Information About Your Visit        [employer] Information     [employer] gives you secure access to your electronic health record. If you see a primary care provider, you can also send messages to your care team and make appointments. If you have questions, please call your primary care clinic.  If you do not have a primary care provider, please call [phone] and they will assist you.        Care EveryWhere ID     This is your Care EveryWhere ID. This could be used by other organizations to access your [facility] medical records  [id number]        Your Vitals Were     Pulse Temperature Height Last Period Breastfeeding? BMI (Body Mass Index)    75 97.8  F (36.6  C) (Tympanic) 5' 4.21\" (1.631 m) 11/10/2017 (Approximate) No 30.7 kg/m2       Blood Pressure from Last 3 Encounters:   12/11/17 145/87   10/30/17 165/90   10/25/17 169/87    Weight from Last 3 Encounters:   12/11/17 180 lb (81.6 kg)   10/30/17 176 lb 3.2 oz (79.9 kg)   10/25/17 176 lb 3.2 oz (79.9 kg)              Today, you had the following     No orders found for display         Today's Medication Changes          These changes are accurate " as of: 12/11/17  4:06 PM.  If you have any questions, ask your nurse or doctor.               These medicines have changed or have updated prescriptions.        Dose/Directions    chlorthalidone 25 MG tablet   Commonly known as:  HYGROTON   This may have changed:  how much to take   Used for:  Hypertension goal BP (blood pressure) < 140/90   Changed by:  Dennise Terrazas PA-C        Dose:  25 mg   Take 1 tablet (25 mg) by mouth daily   Quantity:  90 tablet   Refills:  1            Where to get your medicines      These medications were sent to [facility] [place] [address] [address]     Phone:  [phone]     chlorthalidone 25 MG tablet                Primary Care Provider Office Phone # Fax #    Critical access hospital [phone] [address] [facility] [address]        Equal Access to Services     ART SOLORZANO : Hadii ivet deng hadasho Soomaali, waaxda luqadaha, qaybta kaalmada adeegyada, waxay edelin haycarolina castellano . So [facility] [phone].    ATENCIÓN: Si habla español, tiene a hickman disposición servicios gratuitos de asistencia lingüística. [facility] [phone].    We comply with applicable federal civil rights laws and [place] laws. We do not discriminate on the basis of race, color, national origin, age, disability, sex, sexual orientation, or gender identity.            Thank you!     Thank you for choosing [facility]  for your care. Our goal is always to provide you with excellent care. Hearing back from our patients is one way we can continue to improve our services. Please take a few minutes to complete the written survey that you may receive in the mail after your visit with us. Thank you!             Your Updated Medication List - Protect others around you: Learn how to safely use, store and throw away your medicines at www.disposemymeds.org.          This list is accurate as of: 12/11/17   4:06 PM.  Always use your most recent med list.                   Brand Name Dispense Instructions for use Diagnosis    CALCIUM + D PO      1 TABLET DAILY        chlorthalidone 25 MG tablet    HYGROTON    90 tablet    Take 1 tablet (25 mg) by mouth daily    Hypertension goal BP (blood pressure) < 140/90       CLARITIN 10 MG tablet   Generic drug:  loratadine      1 TABLET DAILY PRN        fluticasone 50 MCG/ACT spray    FLONASE    1 Package    Spray 2 sprays into both nostrils daily. Prn    Allergic rhinitis, cause unspecified       Multiple vitamin Tabs      1 TABLET DAILY

## 2017-12-11 NOTE — NURSING NOTE
"Chief Complaint   Patient presents with     P GEN     Hypertension       Initial /90  Pulse 75  Temp 97.8  F (36.6  C) (Tympanic)  Ht 5' 4.21\" (1.631 m)  Wt 180 lb (81.6 kg)  LMP 11/10/2017 (Approximate)  Breastfeeding? No  BMI 30.7 kg/m2 Estimated body mass index is 30.7 kg/(m^2) as calculated from the following:    Height as of this encounter: 5' 4.21\" (1.631 m).    Weight as of this encounter: 180 lb (81.6 kg).  Medication Reconciliation: complete     Deirdre Segura MA      "

## 2017-12-11 NOTE — PROGRESS NOTES
PGEN study visit  UNCONTROLLED HYPERTENSION ARM visit 2    SUBJECTIVE:  Jena is here for 2nd PGEN research study visit. Please refer to research tab in the header and today's flow sheet for further details. Patient had uncontrolled  hypertension at enrollment and has a goal of <  140/90 (per Problem list target chosen by PCP)     Prior research note reviewed. Patient is on blood pressure medication(s) at this time.  she has been taking Chlorthalidone 12.5 mg daily and is tolerating the medication well.      Current diet & lifestyle: Limiting sodium intake.  Cut out chips and fries.  Watching the salt intake in cooking  Smoking: none  Alcohol consumption rare 1-2 per month  Other pertinent history none      BP Readings from Last 3 Encounters:   12/11/17 145/87   10/30/17 165/90   10/25/17 169/87       Current Outpatient Prescriptions   Medication Sig Dispense Refill     chlorthalidone (HYGROTON) 25 MG tablet Take 1 tablet (25 mg) by mouth daily 90 tablet 1     fluticasone (FLONASE) 50 MCG/ACT nasal spray Spray 2 sprays into both nostrils daily. Prn   1 Package 11     MULTIPLE VITAMIN OR TABS 1 TABLET DAILY       [DISCONTINUED] chlorthalidone (HYGROTON) 25 MG tablet Take 0.5 tablets (12.5 mg) by mouth daily 45 tablet 1     CALCIUM + D OR 1 TABLET DAILY       CLARITIN 10 MG OR TABS 1 TABLET DAILY PRN       Potassium   Date Value Ref Range Status   10/30/2017 3.4 3.4 - 5.3 mmol/L Final     Creatinine   Date Value Ref Range Status   10/30/2017 0.68 0.52 - 1.04 mg/dL Final     Urea Nitrogen   Date Value Ref Range Status   10/30/2017 12 7 - 30 mg/dL Final     GFR Estimate   Date Value Ref Range Status   10/30/2017 >90 >60 mL/min/1.7m2 Final     Comment:     Non  GFR Calc      A baseline potassium, creatinine, BUN, GFR has been done within past 12 months      OBJECTIVE:  Patient in in no apparent distress and able to provide full history for today's encounter. she denies pain or any current illness   Vitals:  "/87 (BP Location: Left arm, Cuff Size: Adult Regular)  Pulse 75  Temp 97.8  F (36.6  C) (Tympanic)  Ht 5' 4.21\" (1.631 m)  Wt 180 lb (81.6 kg)  LMP 11/10/2017 (Approximate)  Breastfeeding? No  BMI 30.7 kg/m2  Today's BP completed using cuff size: regular on left side  arm.    Pulse Readings from Last 1 Encounters:   12/11/17 75     Is pulse 55 or greater? - Yes    BMI= Body mass index is 30.7 kg/(m^2).  Other exam findings   Cardiovascular:  Rhythm regular, normal S1 and S2.  No murmur, gallop or clicks noted.   No edema noted in lower extremities.          ASSESSMENT/PLAN:   PGEN Flowsheet has been  'filed' ) for this visit (this saves flowsheet data)    Blood pressure reading today is not at the provider specified goal of <140/90.      1.  Based on PGEN RN HTN MGMT standing order the patient will be advised dosage change: increase to 25 mg daily.     2.  We will not be checking a metabolic lab panel today.  Will check with visit in 1 month     3.  Follow up instructions include:     Next Provider visit: Follow up in 1 month..    See avs for more details.  Full research packet also provide to patient previously  Our research team will reach out to patient to schedule follow up visit as well.   Patient was counseled regarding the lifestyle changes (listed below)  to help with BP management Yes  Lifestyle changes that can help control high blood pressure:  Even though PGEN is a study to test effectiveness of genetically guided medications for managing high blood pressure, there are several things you can do to ensure your blood pressure stays in good control:    Maintain a healthy weight (BMI<26). A modest amount of weight loss can be helpful    Limit salt intake to under 2400mg daily    Follow the DASH diet (lean meats, low salt, whole grains, lots of fruits/vegies)    Stay active, try to get in 30 minutes of exercise daily.    Manage your daily stress.    Do not smoke cigarettes (or cut back)    Limit " alcohol (2 drinks/day for men, 1 drink/day for women)  Was AVS  provided to patient with the relevant <dot>PGENPI dot phrase pulled into patient instructions Yes    Patient questionnaire completed and sent to .     Dennise Terrazas PA-C

## 2017-12-11 NOTE — PATIENT INSTRUCTIONS
[facility] Hypertension Study  Visit 2     Thank you for your continued participation in the hypertension study!  Please continue taking your hypertension medications as directed. Your next visit will be in four weeks and will be scheduled for you in the coming days. After it is scheduled, be sure to let the research coordinator know as soon as possible if you cannot make it so that the visit can be rescheduled for you.     Please contact the research coordinator if you receive care for your hypertension outside of your study visits.    If you have any questions, please contact the research coordinator at [phone]. If you experience any symptoms please call the [facility] 24/7 triage number at [phone]. If your phone number, email or address changes please alert the research coordinator.     In the meantime, we ask that you complete the online surveys emailed out to you, if completing online, or mailed out to you, if completing paper surveys, before your next visit.

## 2018-01-08 ENCOUNTER — OFFICE VISIT (OUTPATIENT)
Dept: FAMILY MEDICINE | Facility: CLINIC | Age: 53
End: 2018-01-08
Payer: COMMERCIAL

## 2018-01-08 VITALS
DIASTOLIC BLOOD PRESSURE: 96 MMHG | WEIGHT: 177.4 LBS | SYSTOLIC BLOOD PRESSURE: 140 MMHG | HEIGHT: 64 IN | HEART RATE: 60 BPM | TEMPERATURE: 98.2 F | BODY MASS INDEX: 30.29 KG/M2

## 2018-01-08 DIAGNOSIS — I10 HYPERTENSION GOAL BP (BLOOD PRESSURE) < 140/90: ICD-10-CM

## 2018-01-08 PROCEDURE — 80048 BASIC METABOLIC PNL TOTAL CA: CPT | Performed by: PHYSICIAN ASSISTANT

## 2018-01-08 PROCEDURE — 99214 OFFICE O/P EST MOD 30 MIN: CPT | Performed by: PHYSICIAN ASSISTANT

## 2018-01-08 PROCEDURE — 36415 COLL VENOUS BLD VENIPUNCTURE: CPT | Performed by: PHYSICIAN ASSISTANT

## 2018-01-08 RX ORDER — METOPROLOL SUCCINATE 25 MG/1
25 TABLET, EXTENDED RELEASE ORAL DAILY
Qty: 30 TABLET | Refills: 1 | Status: SHIPPED | OUTPATIENT
Start: 2018-01-08 | End: 2018-02-05

## 2018-01-08 NOTE — PATIENT INSTRUCTIONS
[facility] Hypertension Study   Visit 3     Thank you for your continued participation in the hypertension study!  Please continue taking your hypertension medication as directed. Your next visit will be in four weeks and will be scheduled for you in the coming days. After scheduled, be sure to let the research coordinator know as soon as possible if you cannot make it so that the visit can be rescheduled for you.     Please contact the research coordinator if you receive care for your hypertension outside of your study visits.    If you have any questions, please contact the research coordinator at [phone]. If you experience any symptoms please call the [employer] 24/7 triage number at [phone]. If your phone number, email or address changes please alert the research coordinator.     In the meantime, we ask that you complete the online surveys emailed out to you, if completing online, or mailed out to you, if completing paper surveys, before your next visit.

## 2018-01-08 NOTE — MR AVS SNAPSHOT
After Visit Summary   1/8/2018    Jena Kamara    MRN: [id number]           Patient Information     Date Of Birth          1965        Visit Information        Provider Department      1/8/2018 3:40 PM Dennise Terrazas PA-C [facility]        Today's Diagnoses     Hypertension goal BP (blood pressure) < 140/90          Care Instructions    PGen Hypertension Study   Visit 3     Thank you for your continued participation in the hypertension study!  Please continue taking your hypertension medication as directed. Your next visit will be in four weeks and will be scheduled for you in the coming days. After scheduled, be sure to let the research coordinator know as soon as possible if you cannot make it so that the visit can be rescheduled for you.     Please contact the research coordinator if you receive care for your hypertension outside of your study visits.    If you have any questions, please contact the research coordinator at [phone]. If you experience any symptoms please call the [place] 24/7 triage number at [phone]. If your phone number, email or address changes please alert the research coordinator.     In the meantime, we ask that you complete the online surveys emailed out to you, if completing online, or mailed out to you, if completing paper surveys, before your next visit.            Follow-ups after your visit        Your next 10 appointments already scheduled     Feb 05, 2018  3:40 PM CST   Office Visit with Dennise Terrazas PA-C   [facility] ([facility])    [address] [address] [phone]           Bring a current list of meds and any records pertaining to this visit. For Physicals, please bring immunization records and any forms needing to be filled out. Please arrive 10 minutes early to complete paperwork.            Mar 19, 2018  3:40 PM CDT   Office Visit [facility] Dennise Terrazas,  [facility]    [address] [address] [phone]           Bring a current list of meds and any records pertaining to this visit. For Physicals, please bring immunization records and any forms needing to be filled out. Please arrive 10 minutes early to complete paperwork.            Apr 30, 2018  3:40 PM CDT   Office Visit with Dennise [facility]-C   [facility]    [address] [address] [phone]           Bring a current list of meds and any records pertaining to this visit. For Physicals, please bring immunization records and any forms needing to be filled out. Please arrive 10 minutes early to complete paperwork.            Jul 23, 2018  3:40 PM CDT   Office Visit with Dennise Terrazas PA-C   [facility]    [address] [address] [phone]           Bring a current list of meds and any records pertaining to this visit. For Physicals, please bring immunization records and any forms needing to be filled out. Please arrive 10 minutes early to complete paperwork.            Nov 12, 2018  3:40 PM CST   Office Visit with Dennise Terrazas PA-C   [facility]    [address] [address] [phone]           Bring a current list of meds and any records pertaining to this visit. For Physicals, please bring immunization records and any forms needing to be filled out. Please arrive 10 minutes early to complete paperwork.              Who to contact     Normal or non-critical lab and imaging results will be communicated to you by MyChart, letter or phone within 4 business days after the clinic has received the results. If you do not hear from us within 7 days, please contact the clinic through MyChart or phone. If you have a  "critical or abnormal lab result, we will notify you by phone as soon as possible.  Submit refill requests through [employer] or call your pharmacy and they will forward the refill request to us. Please allow 3 business days for your refill to be completed.          If you need to speak with a  for additional information , please call: [phone]           Additional Information About Your Visit        [employer] Information     [employer] gives you secure access to your electronic health record. If you see a primary care provider, you can also send messages to your care team and make appointments. If you have questions, please call your primary care clinic.  If you do not have a primary care provider, please call [phone] and they will assist you.        Care EveryWhere ID     This is your Care EveryWhere ID. This could be used by other organizations to access your [place] medical records  [id number]        Your Vitals Were     Pulse Temperature Height Last Period BMI (Body Mass Index)       60 98.2  F (36.8  C) 5' 4.25\" (1.632 m) 11/10/2017 (Approximate) 30.21 kg/m2        Blood Pressure from Last 3 Encounters:   01/08/18 (!) 140/96   12/11/17 145/87   10/30/17 165/90    Weight from Last 3 Encounters:   01/08/18 177 lb 6.4 oz (80.5 kg)   12/11/17 180 lb (81.6 kg)   10/30/17 176 lb 3.2 oz (79.9 kg)              We Performed the Following     Basic metabolic panel          Today's Medication Changes          These changes are accurate as of: 1/8/18  4:25 PM.  If you have any questions, ask your nurse or doctor.               Start taking these medicines.        Dose/Directions    metoprolol 25 MG 24 hr tablet   Commonly known as:  TOPROL-XL   Used for:  Hypertension goal BP (blood pressure) < 140/90   Started by:  Dennise Terrazas PA-C        Dose:  25 mg   Take 1 tablet (25 mg) by mouth daily   Quantity:  30 tablet   Refills:  1            Where to get your medicines      These medications were " sent to [facility] [address] [place] [facility] - [place], [place] - [address]  [address] [facility] [address]     Phone:  [phone]     metoprolol 25 MG 24 hr tablet                Primary Care Provider Office Phone # Fax #    [facility] [phone] [address] [address]        Equal Access to Services     ART SOLORZANO : Hadii aad ku hadasho Soomaali, waaxda luqadaha, qaybta kaalmada adeegyada, waxay idiin hayaan adeeg khjessh la'warrenn ah. So [facility] [phone].    ATENCIÓN: Si habla esplauren, tiene a hickman disposición servicios gratuitos de asistencia lingüística. Llame al [phone].    We comply with applicable federal civil rights laws and [place] laws. We do not discriminate on the basis of race, color, national origin, age, disability, sex, sexual orientation, or gender identity.            Thank you!     Thank you for choosing [facility]  for your care. Our goal is always to provide you with excellent care. Hearing back from our patients is one way we can continue to improve our services. Please take a few minutes to complete the written survey that you may receive in the mail after your visit with us. Thank you!             Your Updated Medication List - Protect others around you: Learn how to safely use, store and throw away your medicines at www.disposemymeds.org.          This list is accurate as of: 1/8/18  4:25 PM.  Always use your most recent med list.                   Brand Name Dispense Instructions for use Diagnosis    chlorthalidone 25 MG tablet    HYGROTON    90 tablet    Take 1 tablet (25 mg) by mouth daily    Hypertension goal BP (blood pressure) < 140/90       CLARITIN 10 MG tablet   Generic drug:  loratadine      1 TABLET DAILY PRN        fluticasone 50 MCG/ACT spray    FLONASE    1 Package    Spray 2 sprays into both nostrils daily. Prn    Allergic rhinitis, cause unspecified       metoprolol 25 MG 24 hr tablet    TOPROL-XL    30  tablet    Take 1 tablet (25 mg) by mouth daily    Hypertension goal BP (blood pressure) < 140/90       Multiple vitamin Tabs      1 TABLET DAILY

## 2018-01-08 NOTE — NURSING NOTE
"Chief Complaint   Patient presents with     Hypertension     P-Gen Study       Initial BP (!) 152/96  Pulse 60  Temp 98.2  F (36.8  C)  Ht 5' 4.25\" (1.632 m)  Wt 177 lb 6.4 oz (80.5 kg)  LMP 11/10/2017 (Approximate)  BMI 30.21 kg/m2 Estimated body mass index is 30.21 kg/(m^2) as calculated from the following:    Height as of this encounter: 5' 4.25\" (1.632 m).    Weight as of this encounter: 177 lb 6.4 oz (80.5 kg).  Medication Reconciliation: complete  Melanie Bautista CMA    "

## 2018-01-08 NOTE — LETTER
January 16, 2018      Jena Kamara  [address] [address]        Dear ,    Your potassium is low, which is likely due to the diuretic you have started (chlorthalidone). Given how low it is, I recommend we start a low dose potassium supplement now (we may try to keep your potassium at goal in the future with changes in the diet, we'll discuss more at your next appt).   Let's recheck this level in 1 month.     Resulted Orders   Basic metabolic panel   Result Value Ref Range    Sodium 137 133 - 144 mmol/L    Potassium 2.8 (L) 3.4 - 5.3 mmol/L    Chloride 100 94 - 109 mmol/L    Carbon Dioxide 28 20 - 32 mmol/L    Anion Gap 9 3 - 14 mmol/L    Glucose 77 70 - 99 mg/dL      Comment:      Non Fasting    Urea Nitrogen 15 7 - 30 mg/dL    Creatinine 0.63 0.52 - 1.04 mg/dL    GFR Estimate >90 >60 mL/min/1.7m2      Comment:      Non  GFR Calc    GFR Estimate If Black >90 >60 mL/min/1.7m2      Comment:       GFR Calc    Calcium 8.8 8.5 - 10.1 mg/dL       If you have any questions or concerns, please call the clinic at the number listed above.       Sincerely,      Dennise Terrazas PA-C / susana

## 2018-01-08 NOTE — PROGRESS NOTES
PGEN study visit  UNCONTROLLED HYPERTENSION ARM visit 3    SUBJECTIVE:  Jena is here for 3rd PGEN research study visit. Please refer to research tab in the header and today's flow sheet for further details. Patient had uncontrolled  hypertension at enrollment and has a goal of <  140/90 (per Problem list target chosen by PCP)    Checks blood pressure typically once per day 120/70 mmHg's, tends to be a bit higher in the afternoon 130/80mmHg.  She denies any BP > 140/90 mmHg at home.       Prior research note reviewed. Patient is on blood pressure medication(s) at this time.  she has been taking chlorthalidone 25 mg and is tolerating the medication well.      Current diet & lifestyle: holiday eating, otherwise doing well with her sodium intake.   Smoking: none  Alcohol consumption social  Other pertinent history none      BP Readings from Last 3 Encounters:   01/08/18 (!) 140/96   12/11/17 145/87   10/30/17 165/90       Current Outpatient Prescriptions   Medication Sig Dispense Refill     chlorthalidone (HYGROTON) 25 MG tablet Take 1 tablet (25 mg) by mouth daily 90 tablet 1     fluticasone (FLONASE) 50 MCG/ACT nasal spray Spray 2 sprays into both nostrils daily. Prn   1 Package 11     MULTIPLE VITAMIN OR TABS 1 TABLET DAILY       CLARITIN 10 MG OR TABS 1 TABLET DAILY PRN       Potassium   Date Value Ref Range Status   10/30/2017 3.4 3.4 - 5.3 mmol/L Final     Creatinine   Date Value Ref Range Status   10/30/2017 0.68 0.52 - 1.04 mg/dL Final     Urea Nitrogen   Date Value Ref Range Status   10/30/2017 12 7 - 30 mg/dL Final     GFR Estimate   Date Value Ref Range Status   10/30/2017 >90 >60 mL/min/1.7m2 Final     Comment:     Non  GFR Calc      A baseline potassium, creatinine, BUN, GFR has been done within past 12 months      OBJECTIVE:  Patient in in no apparent distress and able to provide full history for today's encounter. she denies pain or any current illness   Vitals: BP (!) 140/96  Pulse 60   "Temp 98.2  F (36.8  C)  Ht 5' 4.25\" (1.632 m)  Wt 177 lb 6.4 oz (80.5 kg)  LMP 11/10/2017 (Approximate)  BMI 30.21 kg/m2  Today's BP completed using cuff size: regular on left side  arm.    Pulse Readings from Last 1 Encounters:   01/08/18 60     Is pulse 55 or greater? - Yes    BMI= Body mass index is 30.21 kg/(m^2).  Other exam findings   Cardiovascular:  Rhythm regular, normal S1 and S2.  No murmur, gallop or clicks noted.   No edema noted in lower extremities.          ASSESSMENT/PLAN:   PGEN Flowsheet has been  'filed' ) for this visit (this saves flowsheet data)    Blood pressure reading today is not at the provider specified goal of <140/90.      1.  Based on PGEN RN HTN MGMT standing order the patient will be advised begin: toprol xl 25 mg daily.     2.  We will be checking a metabolic lab panel today.      3.  Follow up instructions include:     Next Provider visit: Follow up in 1 month..    See avs for more details.  Full research packet also provide to patient previously  Our research team will reach out to patient to schedule follow up visit as well.   Patient was counseled regarding the lifestyle changes (listed below)  to help with BP management Yes  Lifestyle changes that can help control high blood pressure:  Even though PGEN is a study to test effectiveness of genetically guided medications for managing high blood pressure, there are several things you can do to ensure your blood pressure stays in good control:    Maintain a healthy weight (BMI<26). A modest amount of weight loss can be helpful    Limit salt intake to under 2400mg daily    Follow the DASH diet (lean meats, low salt, whole grains, lots of fruits/vegies)    Stay active, try to get in 30 minutes of exercise daily.    Manage your daily stress.    Do not smoke cigarettes (or cut back)    Limit alcohol (2 drinks/day for men, 1 drink/day for women)  Was AVS  provided to patient with the relevant <dot>PGENPI dot phrase pulled into " patient instructions Yes    Monitor home BP and bring in log    Dennise Terrazas PA-C

## 2018-01-09 LAB
ANION GAP SERPL CALCULATED.3IONS-SCNC: 9 MMOL/L (ref 3–14)
BUN SERPL-MCNC: 15 MG/DL (ref 7–30)
CALCIUM SERPL-MCNC: 8.8 MG/DL (ref 8.5–10.1)
CHLORIDE SERPL-SCNC: 100 MMOL/L (ref 94–109)
CO2 SERPL-SCNC: 28 MMOL/L (ref 20–32)
CREAT SERPL-MCNC: 0.63 MG/DL (ref 0.52–1.04)
GFR SERPL CREATININE-BSD FRML MDRD: >90 ML/MIN/1.7M2
GLUCOSE SERPL-MCNC: 77 MG/DL (ref 70–99)
POTASSIUM SERPL-SCNC: 2.8 MMOL/L (ref 3.4–5.3)
SODIUM SERPL-SCNC: 137 MMOL/L (ref 133–144)

## 2018-01-09 RX ORDER — POTASSIUM CHLORIDE 1500 MG/1
20 TABLET, EXTENDED RELEASE ORAL DAILY
Qty: 30 TABLET | Refills: 0 | Status: SHIPPED | OUTPATIENT
Start: 2018-01-09 | End: 2018-02-06

## 2018-02-05 ENCOUNTER — OFFICE VISIT (OUTPATIENT)
Dept: FAMILY MEDICINE | Facility: CLINIC | Age: 53
End: 2018-02-05
Payer: COMMERCIAL

## 2018-02-05 VITALS
DIASTOLIC BLOOD PRESSURE: 86 MMHG | WEIGHT: 177.8 LBS | HEART RATE: 70 BPM | HEIGHT: 64 IN | SYSTOLIC BLOOD PRESSURE: 130 MMHG | TEMPERATURE: 98.5 F | BODY MASS INDEX: 30.35 KG/M2

## 2018-02-05 DIAGNOSIS — I10 HYPERTENSION GOAL BP (BLOOD PRESSURE) < 140/90: ICD-10-CM

## 2018-02-05 PROCEDURE — 84132 ASSAY OF SERUM POTASSIUM: CPT | Performed by: PHYSICIAN ASSISTANT

## 2018-02-05 PROCEDURE — 99213 OFFICE O/P EST LOW 20 MIN: CPT | Performed by: PHYSICIAN ASSISTANT

## 2018-02-05 PROCEDURE — 36415 COLL VENOUS BLD VENIPUNCTURE: CPT | Performed by: PHYSICIAN ASSISTANT

## 2018-02-05 RX ORDER — CHLORTHALIDONE 25 MG/1
25 TABLET ORAL DAILY
Qty: 90 TABLET | Refills: 1 | Status: SHIPPED | OUTPATIENT
Start: 2018-02-05 | End: 2018-05-02

## 2018-02-05 RX ORDER — METOPROLOL SUCCINATE 25 MG/1
25 TABLET, EXTENDED RELEASE ORAL DAILY
Qty: 90 TABLET | Refills: 1 | Status: SHIPPED | OUTPATIENT
Start: 2018-02-05 | End: 2018-07-23

## 2018-02-05 NOTE — MR AVS SNAPSHOT
After Visit Summary   2/5/2018    Jena Kamara    MRN: [id number]           Patient Information     Date Of Birth          1965        Visit Information        Provider Department      2/5/2018 3:40 PM Dennise Terrazas PA-C [facility]        Today's Diagnoses     Hypertension goal BP (blood pressure) < 140/90          Care Instructions    PGen Hypertension Study   Visit 4     Thank you for your continued participation in the hypertension study!  Please continue taking your hypertension medications as directed. Your next visit will be in one and a half months and will be scheduled for you in the coming days.     After it is scheduled, be sure to let the research coordinator know as soon as possible if you cannot make it so that the visit can be rescheduled for you.     Please contact the research coordinator if you receive care for your hypertension outside of your study visits.    If you have any questions, please contact the research coordinator at [phone]. If you experience any symptoms please call the [place] 24/7 triage number at [phone]. If your phone number, email or address changes please alert the research coordinator.     In the meantime, we ask that you complete the online surveys emailed out to you, if completing online, or mailed out to you, if completing paper surveys, before your next visit.            Follow-ups after your visit        Your next 10 appointments already scheduled     Mar 19, 2018  3:40 PM CDT   Office Visit with Dennise Terrazas PA-C   [facility]    [address] [address] [phone]           Bring a current list of meds and any records pertaining to this visit. For Physicals, please bring immunization records and any forms needing to be filled out. Please arrive 10 minutes early to complete paperwork.            Apr 30, 2018  3:40 PM CDT   Office Visit with  Dennise Terrazas PA-C   [facility]    [address] [address] [phone]           Bring a current list of meds and any records pertaining to this visit. For Physicals, please bring immunization records and any forms needing to be filled out. Please arrive 10 minutes early to complete paperwork.            Jul 23, 2018  3:40 PM CDT   Office Visit with Dennise Terrazas PA-C   [facility]    [address] [address] [phone]           Bring a current list of meds and any records pertaining to this visit. For Physicals, please bring immunization records and any forms needing to be filled out. Please arrive 10 minutes early to complete paperwork.            Nov 12, 2018  3:40 PM CST   Office Visit with Dennise Terrazas PA-C   [facility]    [address] [address] [phone]           Bring a current list of meds and any records pertaining to this visit. For Physicals, please bring immunization records and any forms needing to be filled out. Please arrive 10 minutes early to complete paperwork.              Who to contact     Normal or non-critical lab and imaging results will be communicated to you by MyChart, letter or phone within 4 business days after the clinic has received the results. If you do not hear from us within 7 days, please contact the clinic through MyChart or phone. If you have a critical or abnormal lab result, we will notify you by phone as soon as possible.  Submit refill requests through [employer] or call your pharmacy and they will forward the refill request to us. Please allow 3 business days for your refill to be completed.          If you need to speak with a  for additional information , please call: [phone]           Additional Information About Your  "Visit        MyChart Information     [employer] gives you secure access to your electronic health record. If you see a primary care provider, you can also send messages to your care team and make appointments. If you have questions, please call your primary care clinic.  If you do not have a primary care provider, please call [phone] and they will assist you.        Care EveryWhere ID     This is your Care EveryWhere ID. This could be used by other organizations to access your [place] medical records  [id number]        Your Vitals Were     Pulse Temperature Height Last Period Breastfeeding? BMI (Body Mass Index)    70 98.5  F (36.9  C) (Tympanic) 5' 4.25\" (1.632 m) (LMP Unknown) No 30.28 kg/m2       Blood Pressure from Last 3 Encounters:   02/05/18 130/86   01/08/18 (!) 140/96   12/11/17 145/87    Weight from Last 3 Encounters:   02/05/18 177 lb 12.8 oz (80.6 kg)   01/08/18 177 lb 6.4 oz (80.5 kg)   12/11/17 180 lb (81.6 kg)              We Performed the Following     Potassium          Where to get your medicines      These medications were sent to [place] [address] [place] [address] [address]     Phone:  [phone]     chlorthalidone 25 MG tablet    metoprolol succinate 25 MG 24 hr tablet          Primary Care Provider Office Phone # Fax #    [facility] [phone] [address] [address]        Equal Access to Services     [facility] : Hadii ivet beverlyo Soanca, waaxda luqadaha, qaybta kaalmada adepascale hays. So [facility] [phone].    ATENCIÓN: Si habla español, tiene a hickman disposición servicios gratuitos de asistencia lingüística. Llame al [phone].    We comply with applicable federal civil rights laws and [place] laws. We do not discriminate on the basis of race, color, national origin, age, disability, sex, sexual orientation, or gender identity.          "   Thank you!     Thank you for choosing [facility]  for your care. Our goal is always to provide you with excellent care. Hearing back from our patients is one way we can continue to improve our services. Please take a few minutes to complete the written survey that you may receive in the mail after your visit with us. Thank you!             Your Updated Medication List - Protect others around you: Learn how to safely use, store and throw away your medicines at www.disposemymeds.org.          This list is accurate as of 2/5/18  4:10 PM.  Always use your most recent med list.                   Brand Name Dispense Instructions for use Diagnosis    chlorthalidone 25 MG tablet    HYGROTON    90 tablet    Take 1 tablet (25 mg) by mouth daily    Hypertension goal BP (blood pressure) < 140/90       CLARITIN 10 MG tablet   Generic drug:  loratadine      1 TABLET DAILY PRN        fluticasone 50 MCG/ACT spray    FLONASE    1 Package    Spray 2 sprays into both nostrils daily. Prn    Allergic rhinitis, cause unspecified       metoprolol succinate 25 MG 24 hr tablet    TOPROL-XL    90 tablet    Take 1 tablet (25 mg) by mouth daily    Hypertension goal BP (blood pressure) < 140/90       Multiple vitamin Tabs      1 TABLET DAILY        potassium chloride SA 20 MEQ CR tablet    KLOR-CON    30 tablet    Take 1 tablet (20 mEq) by mouth daily    Hypertension goal BP (blood pressure) < 140/90

## 2018-02-05 NOTE — LETTER
February 8, 2018      Jena Kamara  [address] [address]        Dear ,    Your potassium levels have improved.  I suggest you continue to take the potassium supplement, I have sent a refill.     Resulted Orders   Potassium   Result Value Ref Range    Potassium 3.3 (L) 3.4 - 5.3 mmol/L       If you have any questions or concerns, please call the clinic at the number listed above.       Sincerely,        Dennise Terrazas PA-C / susana

## 2018-02-05 NOTE — PROGRESS NOTES
PGEN study visit  UNCONTROLLED HYPERTENSION ARM visit 4    SUBJECTIVE:  Jena is here for 4th PGEN research study visit. Please refer to research tab in the header and today's flow sheet for further details. Patient had uncontrolled  hypertension at enrollment and has a goal of <  140/90 mmHg (per Problem list target chosen by PCP)     Prior research note reviewed. Patient is on blood pressure medication(s) at this time.  she has been taking chlorthalidone and toprol XL and is tolerating the medication well.      Current diet & lifestyle: She has started the weight loss DASH book and really watching the salt and carbs in her diet.  She is on day 2.  Day 2 of no coffee and has a little headache.   Smoking: none  Alcohol consumption minimal   Other pertinent history none      BP Readings from Last 3 Encounters:   02/05/18 130/86   01/08/18 (!) 140/96   12/11/17 145/87       Current Outpatient Prescriptions   Medication Sig Dispense Refill     potassium chloride SA (KLOR-CON) 20 MEQ CR tablet Take 1 tablet (20 mEq) by mouth daily 30 tablet 0     metoprolol (TOPROL-XL) 25 MG 24 hr tablet Take 1 tablet (25 mg) by mouth daily 30 tablet 1     chlorthalidone (HYGROTON) 25 MG tablet Take 1 tablet (25 mg) by mouth daily 90 tablet 1     MULTIPLE VITAMIN OR TABS 1 TABLET DAILY       fluticasone (FLONASE) 50 MCG/ACT nasal spray Spray 2 sprays into both nostrils daily. Prn   (Patient not taking: Reported on 2/5/2018) 1 Package 11     CLARITIN 10 MG OR TABS 1 TABLET DAILY PRN       Potassium   Date Value Ref Range Status   01/08/2018 2.8 (L) 3.4 - 5.3 mmol/L Final     Creatinine   Date Value Ref Range Status   01/08/2018 0.63 0.52 - 1.04 mg/dL Final     Urea Nitrogen   Date Value Ref Range Status   01/08/2018 15 7 - 30 mg/dL Final     GFR Estimate   Date Value Ref Range Status   01/08/2018 >90 >60 mL/min/1.7m2 Final     Comment:     Non  GFR Calc      A baseline potassium, creatinine, BUN, GFR has been done within  "past 12 months    Potassium low at last visit, started on potassium supplement.  Due to recheck today.        OBJECTIVE:  Patient in in no apparent distress and able to provide full history for today's encounter. she denies pain or any current illness   Vitals: /86  Pulse 70  Temp 98.5  F (36.9  C) (Tympanic)  Ht 5' 4.25\" (1.632 m)  Wt 177 lb 12.8 oz (80.6 kg)  LMP  (LMP Unknown)  Breastfeeding? No  BMI 30.28 kg/m2  Today's BP completed using cuff size: regular on right side arm.    Pulse Readings from Last 1 Encounters:   02/05/18 70     Is pulse 55 or greater? - Yes    BMI= Body mass index is 30.28 kg/(m^2).  Other exam findings Cardiovascular:  Rhythm regular, normal S1 and S2.  No murmur, gallop or clicks noted.   No edema noted in lower extremities.          ASSESSMENT/PLAN:   [facility] (this saves flowsheet data)    Blood pressure reading today is at the provider specified goal of <140/90.      1.  Based on PGEN RN HTN MGMT standing order the patient will be advised continue current medication regimen unchanged.     2.  We will be checking a potassium today.      3.  Follow up instructions include:     Next Provider visit: Follow up in 1.5 month..    See avs for more details.  Full research packet also provide to patient previously  Our research team will reach out to patient to schedule follow up visit as well.   Patient was counseled regarding the lifestyle changes (listed below)  to help with BP management Yes  Lifestyle changes that can help control high blood pressure:  Even though [facility] to test effectiveness of genetically guided medications for managing high blood pressure, there are several things you can do to ensure your blood pressure stays in good control:    Maintain a healthy weight (BMI<26). A modest amount of weight loss can be helpful    Limit salt intake to under 2400mg daily    Follow the DASH diet (lean meats, low salt, whole " grains, lots of fruits/vegies)    Stay active, try to get in 30 minutes of exercise daily.    Manage your daily stress.    Do not smoke cigarettes (or cut back)    Limit alcohol (2 drinks/day for men, 1 drink/day for women)  Was AVS  provided to patient with the relevant <dot>PGENPI dot phrase pulled into patient instructions Yes    Dennise Terrazas PA-C

## 2018-02-05 NOTE — PATIENT INSTRUCTIONS
[facility] Hypertension Study   Visit 4     Thank you for your continued participation in the hypertension study!  Please continue taking your hypertension medications as directed. Your next visit will be in one and a half months and will be scheduled for you in the coming days.     After it is scheduled, be sure to let the research coordinator know as soon as possible if you cannot make it so that the visit can be rescheduled for you.     Please contact the research coordinator if you receive care for your hypertension outside of your study visits.    If you have any questions, please contact the research coordinator at [phone]. If you experience any symptoms please call the [facility] 24/7 triage number at [phone]. If your phone number, email or address changes please alert the research coordinator.     In the meantime, we ask that you complete the online surveys emailed out to you, if completing online, or mailed out to you, if completing paper surveys, before your next visit.

## 2018-02-05 NOTE — NURSING NOTE
"Chief Complaint   Patient presents with     P Gen       Initial /86  Pulse 70  Temp 98.5  F (36.9  C) (Tympanic)  Ht 5' 4.25\" (1.632 m)  Wt 177 lb 12.8 oz (80.6 kg)  LMP  (LMP Unknown)  Breastfeeding? No  BMI 30.28 kg/m2 Estimated body mass index is 30.28 kg/(m^2) as calculated from the following:    Height as of this encounter: 5' 4.25\" (1.632 m).    Weight as of this encounter: 177 lb 12.8 oz (80.6 kg).  Medication Reconciliation: complete     Deirdre Segura MA      "

## 2018-02-06 LAB — POTASSIUM SERPL-SCNC: 3.3 MMOL/L (ref 3.4–5.3)

## 2018-02-06 RX ORDER — POTASSIUM CHLORIDE 1500 MG/1
20 TABLET, EXTENDED RELEASE ORAL DAILY
Qty: 90 TABLET | Refills: 0 | Status: SHIPPED | OUTPATIENT
Start: 2018-02-06 | End: 2018-05-04

## 2018-03-19 ENCOUNTER — OFFICE VISIT (OUTPATIENT)
Dept: FAMILY MEDICINE | Facility: CLINIC | Age: 53
End: 2018-03-19
Payer: COMMERCIAL

## 2018-03-19 VITALS
WEIGHT: 167.4 LBS | HEIGHT: 64 IN | HEART RATE: 75 BPM | DIASTOLIC BLOOD PRESSURE: 78 MMHG | TEMPERATURE: 97.1 F | SYSTOLIC BLOOD PRESSURE: 120 MMHG | BODY MASS INDEX: 28.58 KG/M2

## 2018-03-19 DIAGNOSIS — I10 HYPERTENSION GOAL BP (BLOOD PRESSURE) < 140/90: Primary | ICD-10-CM

## 2018-03-19 DIAGNOSIS — E87.6 HYPOKALEMIA: ICD-10-CM

## 2018-03-19 PROCEDURE — 99213 OFFICE O/P EST LOW 20 MIN: CPT | Performed by: PHYSICIAN ASSISTANT

## 2018-03-19 NOTE — Clinical Note
Please call Jena, she has received approximately $400 in lab bills since starting this study.  Did they not run it through her insurance?

## 2018-03-19 NOTE — MR AVS SNAPSHOT
After Visit Summary   3/19/2018    Jena Kamara    MRN: [id number]           Patient Information     Date Of Birth          1965        Visit Information        Provider Department      3/19/2018 3:40 PM Dennise Terrazas PA-C [facility]        Care Instructions    PGen Hypertension Study  Visit 5    Thank you for your continued participation in the hypertension study!  Please continue taking your hypertension medications as directed. Your next visit will be in one and a half months and will be scheduled for you in the coming days. After it is scheduled, be sure to let the research coordinator know as soon as possible if you cannot make it so that the visit can be rescheduled for you.     Please contact the research coordinator if you receive care for your hypertension outside of your study visits.    If you have any questions, please contact the research coordinator at [phone]. If you experience any symptoms please call the [facility] 24/7 triage number at [phone]. If your phone number, email or address changes please alert the research coordinator.     In the meantime, we ask that you complete the online surveys emailed out to you, if completing online, or mailed out to you, if completing paper surveys, before your next visit.            Follow-ups after your visit        Your next 10 appointments already scheduled     Apr 30, 2018  3:40 PM CDT   Office Visit with Dennise Terrazas PA-C   [facility] [address] ([facility])    [address] [address] [phone]           Bring a current list of meds and any records pertaining to this visit. For Physicals, please bring immunization records and any forms needing to be filled out. Please arrive 10 minutes early to complete paperwork.            Jul 23, 2018  3:40 PM CDT   Office Visit with Dennise Terrazas PA-C   [facility] [address] ([facility])    [address] [address] [phone]           Bring a current list of meds and any records pertaining to this visit. For Physicals, please bring immunization records and any forms needing to be filled out. Please arrive 10 minutes early to complete paperwork.            Nov 12, 2018  3:40 PM CST   Office Visit with Dennise Terrazas PA-C   [facility]    [address] [address] [phone]           Bring a current list of meds and any records pertaining to this visit. For Physicals, please bring immunization records and any forms needing to be filled out. Please arrive 10 minutes early to complete paperwork.              Who to contact     Normal or non-critical lab and imaging results will be communicated to you by LP Aminahart, letter or phone within 4 business days after the clinic has received the results. If you do not hear from us within 7 days, please contact the clinic through LP Aminahart or phone. If you have a critical or abnormal lab result, we will notify you by phone as soon as possible.  Submit refill requests through [facility] or call your pharmacy and they will forward the refill request to us. Please allow 3 business days for your refill to be completed.          If you need to speak with a  for additional information , please call: [phone]           Additional Information About Your Visit        [facility] Information     [facility] gives you secure access to your electronic health record. If you see a primary care provider, you can also send messages to your care team and make appointments. If you have questions, please call your primary care clinic.  If you do not have a primary care provider, please call [phone] and they will assist you.        Care EveryWhere ID     This is your Care EveryWhere ID. This could be used by other organizations to access your [facility]  "records  [id number]        Your Vitals Were     Pulse Temperature Height Last Period Breastfeeding? BMI (Body Mass Index)    75 97.1  F (36.2  C) (Tympanic) 5' 4.2\" (1.631 m) 02/19/2018 No 28.56 kg/m2       Blood Pressure from Last 3 Encounters:   03/19/18 120/78   02/05/18 130/86   01/08/18 (!) 140/96    Weight from Last 3 Encounters:   03/19/18 167 lb 6.4 oz (75.9 kg)   02/05/18 177 lb 12.8 oz (80.6 kg)   01/08/18 177 lb 6.4 oz (80.5 kg)              Today, you had the following     No orders found for display       Primary Care Provider Office Phone # Fax #    [facility] [phone] [address] [address]        Equal Access to Services     ART SOLORZANO : Hadii ivet beverlyo Soanca, waaxda luqadaha, qaybta kaalmada adelaciyada, pascale castillo. So [facility] [phone].    ATENCIÓN: Si habla español, tiene a hickamn disposición servicios gratuitos de asistencia lingüística. Llame al [phone].    We comply with applicable federal civil rights laws and [place] laws. We do not discriminate on the basis of race, color, national origin, age, disability, sex, sexual orientation, or gender identity.            Thank you!     Thank you for choosing [facility]  for your care. Our goal is always to provide you with excellent care. Hearing back from our patients is one way we can continue to improve our services. Please take a few minutes to complete the written survey that you may receive in the mail after your visit with us. Thank you!             Your Updated Medication List - Protect others around you: Learn how to safely use, store and throw away your medicines at www.disposemymeds.org.          This list is accurate as of 3/19/18  3:43 PM.  Always use your most recent med list.                   Brand Name Dispense Instructions for use Diagnosis    chlorthalidone 25 MG tablet    HYGROTON    90 tablet    Take 1 tablet (25 " mg) by mouth daily    Hypertension goal BP (blood pressure) < 140/90       CLARITIN 10 MG tablet   Generic drug:  loratadine      1 TABLET DAILY PRN        fluticasone 50 MCG/ACT spray    FLONASE    1 Package    Spray 2 sprays into both nostrils daily. Prn    Allergic rhinitis, cause unspecified       metoprolol succinate 25 MG 24 hr tablet    TOPROL-XL    90 tablet    Take 1 tablet (25 mg) by mouth daily    Hypertension goal BP (blood pressure) < 140/90       Multiple vitamin Tabs      1 TABLET DAILY        potassium chloride SA 20 MEQ CR tablet    KLOR-CON    90 tablet    Take 1 tablet (20 mEq) by mouth daily    Hypertension goal BP (blood pressure) < 140/90

## 2018-03-19 NOTE — NURSING NOTE
"Chief Complaint   Patient presents with     PGen       Initial BP (!) 120/100  Pulse 75  Temp 97.1  F (36.2  C) (Tympanic)  Ht 5' 4.2\" (1.631 m)  Wt 167 lb 6.4 oz (75.9 kg)  LMP 02/19/2018  Breastfeeding? No  BMI 28.56 kg/m2 Estimated body mass index is 28.56 kg/(m^2) as calculated from the following:    Height as of this encounter: 5' 4.2\" (1.631 m).    Weight as of this encounter: 167 lb 6.4 oz (75.9 kg).  Medication Reconciliation: complete     Deirdre Segura MA      "

## 2018-03-19 NOTE — PROGRESS NOTES
PGEN study visit  UNCONTROLLED HYPERTENSION ARM visit 5    SUBJECTIVE:  Jean is here for 5th PGEN research study visit. Please refer to research tab in the header and today's flow sheet for further details. Patient had uncontrolled  hypertension at enrollment and has a goal of <  140/90 mmHg (per Problem list target chosen by PCP)     Prior research note reviewed. Patient is on blood pressure medication(s) at this time.  she has been taking metoprolol 25 mg daily and chlorthalidone 25 mg daily and is tolerating the medication well.    Home /70mm Hg.      Current diet & lifestyle: Following DASH diet and watching diet.  Lost 10 lbs in the past month.  Has cut out bread and doing well with this.    Smoking: none  Alcohol consumption social  Other pertinent history Still will sometimes feel lightheaded if she stands quickly.  No LOC.        BP Readings from Last 3 Encounters:   03/19/18 (!) 120/100   02/05/18 130/86   01/08/18 (!) 140/96       Current Outpatient Prescriptions   Medication Sig Dispense Refill     potassium chloride SA (KLOR-CON) 20 MEQ CR tablet Take 1 tablet (20 mEq) by mouth daily 90 tablet 0     metoprolol succinate (TOPROL-XL) 25 MG 24 hr tablet Take 1 tablet (25 mg) by mouth daily 90 tablet 1     chlorthalidone (HYGROTON) 25 MG tablet Take 1 tablet (25 mg) by mouth daily 90 tablet 1     MULTIPLE VITAMIN OR TABS 1 TABLET DAILY       fluticasone (FLONASE) 50 MCG/ACT nasal spray Spray 2 sprays into both nostrils daily. Prn   (Patient not taking: Reported on 2/5/2018) 1 Package 11     CLARITIN 10 MG OR TABS 1 TABLET DAILY PRN       Potassium   Date Value Ref Range Status   02/05/2018 3.3 (L) 3.4 - 5.3 mmol/L Final     Creatinine   Date Value Ref Range Status   01/08/2018 0.63 0.52 - 1.04 mg/dL Final     Urea Nitrogen   Date Value Ref Range Status   01/08/2018 15 7 - 30 mg/dL Final     GFR Estimate   Date Value Ref Range Status   01/08/2018 >90 >60 mL/min/1.7m2 Final     Comment:     Non   "American GFR Calc      A baseline potassium, creatinine, BUN, GFR has been done within past 12 months      OBJECTIVE:  Patient in in no apparent distress and able to provide full history for today's encounter. she denies pain or any current illness   Vitals: BP (!) 120/100  Pulse 75  Temp 97.1  F (36.2  C) (Tympanic)  Ht 5' 4.2\" (1.631 m)  Wt 167 lb 6.4 oz (75.9 kg)  LMP 02/19/2018  Breastfeeding? No  BMI 28.56 kg/m2  Today's BP completed using cuff size: regular on right side arm.    Pulse Readings from Last 1 Encounters:   03/19/18 75     Is pulse 55 or greater? - Yes    BMI= Body mass index is 28.56 kg/(m^2).  Other exam findings   .    ASSESSMENT/PLAN:   [facility] Flowsheet has been  'filed' ) for this visit (this saves flowsheet data)    Blood pressure reading today is at the provider specified goal of <140/90.      1.  Based on [facility] RN HTN MGMT standing order the patient will be advised continue current medication regimen unchanged.     2.  We will not be checking a metabolic lab panel today.  Continues on potassium supplement, will check at next appt in 1 month.     3.  Follow up instructions include:     Next Provider visit: Follow up in 1 month..    See avs for more details.  Full research packet also provide to patient previously  Our research team will reach out to patient to schedule follow up visit as well.   Patient was counseled regarding the lifestyle changes (listed below)  to help with BP management Yes  Lifestyle changes that can help control high blood pressure:  Even though PGEN is a study to test effectiveness of genetically guided medications for managing high blood pressure, there are several things you can do to ensure your blood pressure stays in good control:    Maintain a healthy weight (BMI<26). A modest amount of weight loss can be helpful    Limit salt intake to under 2400mg daily    Follow the DASH diet (lean meats, low salt, whole grains, lots of fruits/vegies)    Stay active, try " to get in 30 minutes of exercise daily.    Manage your daily stress.    Do not smoke cigarettes (or cut back)    Limit alcohol (2 drinks/day for men, 1 drink/day for women)  Was AVS  provided to patient with the relevant <dot>PGENPI dot phrase pulled into patient instructions Yes    Dennise Terrazas PA-C     stated

## 2018-03-19 NOTE — PATIENT INSTRUCTIONS
[facility] Hypertension Study  Visit 5    Thank you for your continued participation in the hypertension study!  Please continue taking your hypertension medications as directed. Your next visit will be in one and a half months and will be scheduled for you in the coming days. After it is scheduled, be sure to let the research coordinator know as soon as possible if you cannot make it so that the visit can be rescheduled for you.     Please contact the research coordinator if you receive care for your hypertension outside of your study visits.    If you have any questions, please contact the research coordinator at [phone]. If you experience any symptoms please call the [facility] 24/7 triage number at [phone]. If your phone number, email or address changes please alert the research coordinator.     In the meantime, we ask that you complete the online surveys emailed out to you, if completing online, or mailed out to you, if completing paper surveys, before your next visit.

## 2018-05-02 ENCOUNTER — OFFICE VISIT (OUTPATIENT)
Dept: FAMILY MEDICINE | Facility: CLINIC | Age: 53
End: 2018-05-02
Payer: COMMERCIAL

## 2018-05-02 VITALS
BODY MASS INDEX: 27.49 KG/M2 | SYSTOLIC BLOOD PRESSURE: 120 MMHG | HEART RATE: 60 BPM | HEIGHT: 64 IN | WEIGHT: 161 LBS | DIASTOLIC BLOOD PRESSURE: 85 MMHG | TEMPERATURE: 98.4 F

## 2018-05-02 VITALS
OXYGEN SATURATION: 99 % | WEIGHT: 161 LBS | DIASTOLIC BLOOD PRESSURE: 85 MMHG | BODY MASS INDEX: 27.49 KG/M2 | TEMPERATURE: 98.4 F | SYSTOLIC BLOOD PRESSURE: 120 MMHG | HEIGHT: 64 IN | HEART RATE: 60 BPM

## 2018-05-02 DIAGNOSIS — I10 HYPERTENSION GOAL BP (BLOOD PRESSURE) < 140/90: Primary | ICD-10-CM

## 2018-05-02 DIAGNOSIS — M77.12 LATERAL EPICONDYLITIS OF LEFT ELBOW: Primary | ICD-10-CM

## 2018-05-02 PROCEDURE — 36415 COLL VENOUS BLD VENIPUNCTURE: CPT | Performed by: PHYSICIAN ASSISTANT

## 2018-05-02 PROCEDURE — 99213 OFFICE O/P EST LOW 20 MIN: CPT | Performed by: PHYSICIAN ASSISTANT

## 2018-05-02 PROCEDURE — 84132 ASSAY OF SERUM POTASSIUM: CPT | Performed by: PHYSICIAN ASSISTANT

## 2018-05-02 RX ORDER — CHLORTHALIDONE 25 MG/1
12.5 TABLET ORAL DAILY
Qty: 90 TABLET | Refills: 1 | Status: SHIPPED | OUTPATIENT
Start: 2018-05-02 | End: 2018-07-23

## 2018-05-02 NOTE — PATIENT INSTRUCTIONS
[facility] Hypertension Study  Visit 6     Thank you for your continued participation in the hypertension study!  Please continue taking your hypertension medications as directed. Your next visit will be in three months and will be scheduled for you in the coming days. After it is scheduled, be sure to let the research coordinator know as soon as possible if you cannot make it so that the visit can be rescheduled for you.     Please contact the research coordinator if you receive care for your hypertension outside of your study visits.    If you have any questions, please contact the research coordinator at [phone]. If you experience any symptoms please call the [facility] 24/7 triage number at [phone]. If your phone number, email or address changes please alert the research coordinator.     In the meantime, we ask that you complete the online surveys emailed out to you, if completing online, or mailed out to you, if completing paper surveys, before your next visit.    Plan for your general physical in 1 month (we'll recheck your blood pressure then).

## 2018-05-02 NOTE — NURSING NOTE
"Chief Complaint   Patient presents with     Musculoskeletal Problem       Initial /85  Pulse 60  Temp 98.4  F (36.9  C) (Tympanic)  Ht 5' 4.2\" (1.631 m)  Wt 161 lb (73 kg)  LMP 04/28/2018 (Approximate)  SpO2 99%  Breastfeeding? No  BMI 27.46 kg/m2 Estimated body mass index is 27.46 kg/(m^2) as calculated from the following:    Height as of this encounter: 5' 4.2\" (1.631 m).    Weight as of this encounter: 161 lb (73 kg).  Medication Reconciliation: complete     Deirdre Segura MA      "

## 2018-05-02 NOTE — MR AVS SNAPSHOT
After Visit Summary   5/2/2018    Jena Kamara    MRN: [id number]           Patient Information     Date Of Birth          1965        Visit Information        Provider Department      5/2/2018 4:00 PM Dennise Terrazas PA-C [facility]        Today's Diagnoses     Lateral epicondylitis of left elbow    -  1      Care Instructions      Understanding Lateral Epicondylitis    Tendons are strong bands of tissue that connect muscles to bones. Lateral epicondylitis affects the tendons that connect muscles in the forearm to the lateral epicondyle. This is the bony knob on the outer side of the elbow. The condition occurs if the extensor tendons of the wrist become red and swollen (irritated). This can cause pain in the elbow, forearm, and wrist. Because the condition is sometimes caused by playing tennis, it is also known as  tennis elbow.   How to say it  LA-tuhr-rivera [id number]   What causes lateral epicondylitis?  The condition most often occurs because of overuse. This can be from any activity that repeatedly puts stress on the forearm extensor muscles or tendons and wrist. For instance, playing tennis, lifting weights, cutting meat, painting, and typing can all cause the condition. Wear and tear of the tendons from aging or an injury to the tendons can also cause the condition.  Symptoms of lateral epicondylitis  The most common symptom is pain. You may feel it on the outer side of the elbow and down the back of the forearm. It may be worse when moving or using the elbow, forearm, or wrist. You may also feel pain when gripping or lifting things.  Treatment for lateral epicondylitis  Treatments may include:    Resting the elbow, forearm, and wrist. You ll need to avoid movements that can make your symptoms worse. You also may need to avoid certain sports and types of work for a time. This helps relieve symptoms and prevent further damage to the tendons.    Changing the  action that caused the problem. For instance, if the tendons were damaged from playing tennis, it may help to change your playing technique or use different equipment. This helps prevent further damage to the tendons.    Using cold packs. Putting an ice pack on the injured area can help reduce pain and swelling.    Taking pain medicines. Taking prescription or over-the-counter pain medicines may help reduce pain and swelling.      Wearing a brace. This helps reduce strain on the muscles and tendons in the forearm, which may relieve symptoms. It is very important to wear the brace properly.    Doing exercises and physical therapy. These help improve strength and range of motion in the elbow, forearm, and wrist.    Getting shots of medicine into the injured area. These may help relieve symptoms for a time.    Having surgery. This may be an option if other treatments fail to relieve symptoms. In many cases, the surgeon removes the damaged tissue.  Possible complications of lateral epicondylitis  If the tendons involved don t heal properly, symptoms may return or get worse. To help prevent this, follow your treatment plan as directed.  When to call your healthcare provider  Call your healthcare provider right away if you have any of these:    Fever of 100.4 F (38 C) or higher, or as directed    Redness, swelling, or warmth in the elbow or forearm that gets worse    Symptoms that don t get better with treatment, or get worse    New symptoms   Date Last Reviewed: 3/10/2016    8608-6286 [employer] [address] [place] [address]. All rights reserved. This information is not intended as a substitute for professional medical care. Always follow your healthcare professional's instructions.                Follow-ups after your visit        Your next 10 appointments already scheduled     Jul 23, 2018  3:40 PM CDT   Office Visit with Dennise Terrazas PA-C   [facility]    [address] [address] [phone]           Bring a current list of meds and any records pertaining to this visit. For Physicals, please bring immunization records and any forms needing to be filled out. Please arrive 10 minutes early to complete paperwork.            Nov 12, 2018  3:40 PM CST   Office Visit with Dennise Terrazas PA-C   [facility]    [address] [address] [phone]           Bring a current list of meds and any records pertaining to this visit. For Physicals, please bring immunization records and any forms needing to be filled out. Please arrive 10 minutes early to complete paperwork.              Who to contact     Normal or non-critical lab and imaging results will be communicated to you by [employer], letter or phone within 4 business days after the clinic has received the results. If you do not hear from us within 7 days, please contact the clinic through [employer] or phone. If you have a critical or abnormal lab result, we will notify you by phone as soon as possible.  Submit refill requests through A[employer] or call your pharmacy and they will forward the refill request to us. Please allow 3 business days for your refill to be completed.          If you need to speak with a  for additional information , please call: [phone]           Additional Information About Your Visit        AOL Information     AOL gives you secure access to your electronic health record. If you see a primary care provider, you can also send messages to your care team and make appointments. If you have questions, please call your primary care clinic.  If you do not have a primary care provider, please call [phone] and they will assist you.        Care EveryWhere ID     This is your Care EveryWhere ID. This could be used by other organizations to access your [place]  "medical records  [id number]        Your Vitals Were     Pulse Temperature Height Last Period Pulse Oximetry Breastfeeding?    60 98.4  F (36.9  C) (Tympanic) 5' 4.2\" (1.631 m) 04/28/2018 (Approximate) 99% No    BMI (Body Mass Index)                   27.46 kg/m2            Blood Pressure from Last 3 Encounters:   05/02/18 120/85   05/02/18 120/85   03/19/18 120/78    Weight from Last 3 Encounters:   05/02/18 161 lb (73 kg)   05/02/18 161 lb (73 kg)   03/19/18 167 lb 6.4 oz (75.9 kg)              Today, you had the following     No orders found for display         Today's Medication Changes          These changes are accurate as of 5/2/18  4:27 PM.  If you have any questions, ask your nurse or doctor.               Start taking these medicines.        Dose/Directions    order for DME   Used for:  Lateral epicondylitis of left elbow   Started by:  Dennise Terrazas PA-C        Tennis elbow strap   Quantity:  1 Device   Refills:  0         These medicines have changed or have updated prescriptions.        Dose/Directions    chlorthalidone 25 MG tablet   Commonly known as:  HYGROTON   This may have changed:  how much to take   Used for:  Hypertension goal BP (blood pressure) < 140/90   Changed by:  Dennise Terrazas PA-C        Dose:  12.5 mg   Take 0.5 tablets (12.5 mg) by mouth daily   Quantity:  90 tablet   Refills:  1            Where to get your medicines      Some of these will need a paper prescription and others can be bought over the counter.  Ask your nurse if you have questions.     Bring a paper prescription for each of these medications     chlorthalidone 25 MG tablet    order for DME                Primary Care Provider Office Phone # Fax #    [facility] [phone] [address] [address]        Equal Access to Services     ART SOLORZANO AH: Yeimi Parra, wakimmie luqadaha, qaybta kaalmada adesaúl, pascale polanco " la'warrenn anna. So [facility] [phone].    ATENCIÓN: Si habla jenaro, tiene a hickman disposición servicios gratuitos de asistencia lingüística. Venkat shay [phone].    We comply with applicable federal civil rights laws and [place] laws. We do not discriminate on the basis of race, color, national origin, age, disability, sex, sexual orientation, or gender identity.            Thank you!     Thank you for choosing [facility]  for your care. Our goal is always to provide you with excellent care. Hearing back from our patients is one way we can continue to improve our services. Please take a few minutes to complete the written survey that you may receive in the mail after your visit with us. Thank you!             Your Updated Medication List - Protect others around you: Learn how to safely use, store and throw away your medicines at www.disposemymeds.org.          This list is accurate as of 5/2/18  4:27 PM.  Always use your most recent med list.                   Brand Name Dispense Instructions for use Diagnosis    chlorthalidone 25 MG tablet    HYGROTON    90 tablet    Take 0.5 tablets (12.5 mg) by mouth daily    Hypertension goal BP (blood pressure) < 140/90       CLARITIN 10 MG tablet   Generic drug:  loratadine      1 TABLET DAILY PRN        fluticasone 50 MCG/ACT spray    FLONASE    1 Package    Spray 2 sprays into both nostrils daily. Prn    Allergic rhinitis, cause unspecified       metoprolol succinate 25 MG 24 hr tablet    TOPROL-XL    90 tablet    Take 1 tablet (25 mg) by mouth daily    Hypertension goal BP (blood pressure) < 140/90       Multiple vitamin Tabs      1 TABLET DAILY        order for DME     1 Device    Tennis elbow strap    Lateral epicondylitis of left elbow       potassium chloride SA 20 MEQ CR tablet    KLOR-CON    90 tablet    Take 1 tablet (20 mEq) by mouth daily    Hypertension goal BP (blood pressure) < 140/90

## 2018-05-02 NOTE — NURSING NOTE
"Chief Complaint   Patient presents with     PGEN       Initial /85 (BP Location: Left arm, Patient Position: Sitting, Cuff Size: Adult Regular)  Pulse 60  Temp 98.4  F (36.9  C) (Tympanic)  Ht 5' 4.2\" (1.631 m)  Wt 161 lb (73 kg)  LMP 04/28/2018 (Approximate)  Breastfeeding? No  BMI 27.46 kg/m2 Estimated body mass index is 27.46 kg/(m^2) as calculated from the following:    Height as of this encounter: 5' 4.2\" (1.631 m).    Weight as of this encounter: 161 lb (73 kg).  Medication Reconciliation: complete     Deirdre Segura MA      "

## 2018-05-02 NOTE — PATIENT INSTRUCTIONS
Understanding Lateral Epicondylitis    Tendons are strong bands of tissue that connect muscles to bones. Lateral epicondylitis affects the tendons that connect muscles in the forearm to the lateral epicondyle. This is the bony knob on the outer side of the elbow. The condition occurs if the extensor tendons of the wrist become red and swollen (irritated). This can cause pain in the elbow, forearm, and wrist. Because the condition is sometimes caused by playing tennis, it is also known as  tennis elbow.   How to say it  LA-tuhr-rivera [id number]-[id number]-LY-tis   What causes lateral epicondylitis?  The condition most often occurs because of overuse. This can be from any activity that repeatedly puts stress on the forearm extensor muscles or tendons and wrist. For instance, playing tennis, lifting weights, cutting meat, painting, and typing can all cause the condition. Wear and tear of the tendons from aging or an injury to the tendons can also cause the condition.  Symptoms of lateral epicondylitis  The most common symptom is pain. You may feel it on the outer side of the elbow and down the back of the forearm. It may be worse when moving or using the elbow, forearm, or wrist. You may also feel pain when gripping or lifting things.  Treatment for lateral epicondylitis  Treatments may include:    Resting the elbow, forearm, and wrist. You ll need to avoid movements that can make your symptoms worse. You also may need to avoid certain sports and types of work for a time. This helps relieve symptoms and prevent further damage to the tendons.    Changing the action that caused the problem. For instance, if the tendons were damaged from playing tennis, it may help to change your playing technique or use different equipment. This helps prevent further damage to the tendons.    Using cold packs. Putting an ice pack on the injured area can help reduce pain and swelling.    Taking pain medicines. Taking prescription or  over-the-counter pain medicines may help reduce pain and swelling.      Wearing a brace. This helps reduce strain on the muscles and tendons in the forearm, which may relieve symptoms. It is very important to wear the brace properly.    Doing exercises and physical therapy. These help improve strength and range of motion in the elbow, forearm, and wrist.    Getting shots of medicine into the injured area. These may help relieve symptoms for a time.    Having surgery. This may be an option if other treatments fail to relieve symptoms. In many cases, the surgeon removes the damaged tissue.  Possible complications of lateral epicondylitis  If the tendons involved don t heal properly, symptoms may return or get worse. To help prevent this, follow your treatment plan as directed.  When to call your healthcare provider  Call your healthcare provider right away if you have any of these:    Fever of 100.4 F (38 C) or higher, or as directed    Redness, swelling, or warmth in the elbow or forearm that gets worse    Symptoms that don t get better with treatment, or get worse    New symptoms   Date Last Reviewed: 3/10/2016    2547-4275 [employer]. [address] [place] [address], [place] [address]. All rights reserved. This information is not intended as a substitute for professional medical care. Always follow your healthcare professional's instructions.

## 2018-05-02 NOTE — PROGRESS NOTES
PGEN study visit  UNCONTROLLED HYPERTENSION ARM visit 6    SUBJECTIVE:  Jena is here for 6th PGEN research study visit. Please refer to research tab in the header and today's flow sheet for further details. Patient had uncontrolled  hypertension at enrollment and has a goal of <  140/90 mmHg (per Problem list target chosen by PCP)     Prior research note reviewed. Patient is on blood pressure medication(s) at this time.  she has been taking chlorthalidone and metoprolol and is not tolerating the medication well.   The chlorthalidone is causing dryness.     Current diet & lifestyle: no changes, watching salt.  Lost 6 lbs in 2 months.   Noticed clothes fitting better   Smoking: none  Alcohol consumption social  Other pertinent history none      BP Readings from Last 3 Encounters:   05/02/18 120/85   03/19/18 120/78   02/05/18 130/86       Current Outpatient Prescriptions   Medication Sig Dispense Refill     chlorthalidone (HYGROTON) 25 MG tablet Take 1 tablet (25 mg) by mouth daily 90 tablet 1     metoprolol succinate (TOPROL-XL) 25 MG 24 hr tablet Take 1 tablet (25 mg) by mouth daily 90 tablet 1     MULTIPLE VITAMIN OR TABS 1 TABLET DAILY       potassium chloride SA (KLOR-CON) 20 MEQ CR tablet Take 1 tablet (20 mEq) by mouth daily 90 tablet 0     CLARITIN 10 MG OR TABS 1 TABLET DAILY PRN       fluticasone (FLONASE) 50 MCG/ACT nasal spray Spray 2 sprays into both nostrils daily. Prn   1 Package 11     Potassium   Date Value Ref Range Status   02/05/2018 3.3 (L) 3.4 - 5.3 mmol/L Final     Creatinine   Date Value Ref Range Status   01/08/2018 0.63 0.52 - 1.04 mg/dL Final     Urea Nitrogen   Date Value Ref Range Status   01/08/2018 15 7 - 30 mg/dL Final     GFR Estimate   Date Value Ref Range Status   01/08/2018 >90 >60 mL/min/1.7m2 Final     Comment:     Non  GFR Calc      A baseline potassium, creatinine, BUN, GFR has been done within past 12 months      OBJECTIVE:  Patient in in no apparent distress  "and able to provide full history for today's encounter. she denies pain or any current illness   Vitals: /85 (BP Location: Left arm, Patient Position: Sitting, Cuff Size: Adult Regular)  Pulse 60  Temp 98.4  F (36.9  C) (Tympanic)  Ht 5' 4.2\" (1.631 m)  Wt 161 lb (73 kg)  LMP 04/28/2018 (Approximate)  Breastfeeding? No  BMI 27.46 kg/m2  Today's BP completed using cuff size: regular on left side  arm.    Pulse Readings from Last 1 Encounters:   05/02/18 60     Is pulse 55 or greater? - Yes    BMI= Body mass index is 27.46 kg/(m^2).  Other exam findings Cardiovascular:  Rhythm regular, normal S1 and S2.  No murmur, gallop or clicks noted.   No edema noted in lower extremities.          ASSESSMENT/PLAN:   MAGIN Flowsheet has been  'filed' ) for this visit (this saves flowsheet data)    Blood pressure reading today is at the provider specified goal of <140/90.      1.  Based on VICKI RN HTN MGMT standing order the patient will be advised :  Due to side effects from the chlorthalidone, will decrease dose back to 12.5 mg and continue metoprolol as is.     She will monitor home blood pressures and will do a physical in 1 month to recheck blood pressure (as next PGen visit wouldn't be for 3 months).   2.  We will be checking a potassium today.      3.  Follow up instructions include:     Next Provider visit: Follow up in 1 month..    See avs for more details.  Full research packet also provide to patient previously  Our research team will reach out to patient to schedule follow up visit as well.   Patient was counseled regarding the lifestyle changes (listed below)  to help with BP management Yes  Lifestyle changes that can help control high blood pressure:  Even though PGEN is a study to test effectiveness of genetically guided medications for managing high blood pressure, there are several things you can do to ensure your blood pressure stays in good control:    Maintain a healthy weight (BMI<26). A modest " amount of weight loss can be helpful    Limit salt intake to under 2400mg daily    Follow the DASH diet (lean meats, low salt, whole grains, lots of fruits/vegies)    Stay active, try to get in 30 minutes of exercise daily.    Manage your daily stress.    Do not smoke cigarettes (or cut back)    Limit alcohol (2 drinks/day for men, 1 drink/day for women)  Was AVS  provided to patient with the relevant <dot>PGENPI dot phrase pulled into patient instructions Yes    Dennise Terrazas PA-C

## 2018-05-02 NOTE — MR AVS SNAPSHOT
After Visit Summary   5/2/2018    Jena Kamara    MRN: [id number]           Patient Information     Date Of Birth          1965        Visit Information        Provider Department      5/2/2018 3:40 PM Dennise Terrazas PA-C [facility]        Today's Diagnoses     Hypertension goal BP (blood pressure) < 140/90    -  1      Care Instructions    [facility]Gen Hypertension Study  Visit 6     Thank you for your continued participation in the hypertension study!  Please continue taking your hypertension medications as directed. Your next visit will be in three months and will be scheduled for you in the coming days. After it is scheduled, be sure to let the research coordinator know as soon as possible if you cannot make it so that the visit can be rescheduled for you.     Please contact the research coordinator if you receive care for your hypertension outside of your study visits.    If you have any questions, please contact the research coordinator at [phone]. If you experience any symptoms please call the [place] 24/7 triage number at [phone]. If your phone number, email or address changes please alert the research coordinator.     In the meantime, we ask that you complete the online surveys emailed out to you, if completing online, or mailed out to you, if completing paper surveys, before your next visit.    Plan for your general physical in 1 month (we'll recheck your blood pressure then).               Follow-ups after your visit        Your next 10 appointments already scheduled     Jul 23, 2018  3:40 PM CDT   [facility]ennise [facility]-C   [facility]    [address] [address] [phone]           Bring a current list of meds and any records pertaining to this visit. For Physicals, please bring immunization records and any forms needing to be filled out. Please arrive 10 minutes early  "to complete paperwork.            Nov 12, 2018  3:40 PM CST   Office Visit with Dennise Terrazas PA-C   [facility]    [address] [address] [facility] [address] [phone]           Bring a current list of meds and any records pertaining to this visit. For Physicals, please bring immunization records and any forms needing to be filled out. Please arrive 10 minutes early to complete paperwork.              Who to contact     Normal or non-critical lab and imaging results will be communicated to you by [employer], letter or phone within 4 business days after the clinic has received the results. If you do not hear from us within 7 days, please contact the clinic through [employer] or phone. If you have a critical or abnormal lab result, we will notify you by phone as soon as possible.  Submit refill requests through [employer] ([employer]) or call your pharmacy and they will forward the refill request to us. Please allow 3 business days for your refill to be completed.          If you need to speak with a  for additional information , please call: [phone]           Additional Information About Your Visit        [employer]) Information     [employer] gives you secure access to your electronic health record. If you see a primary care provider, you can also send messages to your care team and make appointments. If you have questions, please call your primary care clinic.  If you do not have a primary care provider, please call [phone] and they will assist you.        Care EveryWhere ID     This is your Care EveryWhere ID. This could be used by other organizations to access your [place] medical records  [id number]        Your Vitals Were     Pulse Temperature Height Last Period Breastfeeding? BMI (Body Mass Index)    60 98.4  F (36.9  C) (Tympanic) 5' 4.2\" (1.631 m) 04/28/2018 (Approximate) No 27.46 kg/m2       Blood Pressure from Last 3 Encounters:   05/02/18 120/85 "   05/02/18 120/85   03/19/18 120/78    Weight from Last 3 Encounters:   05/02/18 161 lb (73 kg)   05/02/18 161 lb (73 kg)   03/19/18 167 lb 6.4 oz (75.9 kg)              We Performed the Following     Potassium          Today's Medication Changes          These changes are accurate as of 5/2/18  4:27 PM.  If you have any questions, ask your nurse or doctor.               Start taking these medicines.        Dose/Directions    order for DME   Used for:  Lateral epicondylitis of left elbow   Started by:  Dennise Terrazas PA-C        Tennis elbow strap   Quantity:  1 Device   Refills:  0         These medicines have changed or have updated prescriptions.        Dose/Directions    chlorthalidone 25 MG tablet   Commonly known as:  HYGROTON   This may have changed:  how much to take   Used for:  Hypertension goal BP (blood pressure) < 140/90   Changed by:  Dennise Terrazas PA-C        Dose:  12.5 mg   Take 0.5 tablets (12.5 mg) by mouth daily   Quantity:  90 tablet   Refills:  1            Where to get your medicines      Some of these will need a paper prescription and others can be bought over the counter.  Ask your nurse if you have questions.     Bring a paper prescription for each of these medications     chlorthalidone 25 MG tablet    order for DME                Primary Care Provider Office Phone # Fax #    [facility] [phone] [address] [address]        Equal Access to Services     ART SOLORZANO AH: Hadii ivet Parra, waaxda lukevin, qaybta kaalmada adelaciyada, pascale castillo. So [facility] [place] [phone].    ATENCIÓN: Si habla español, tiene a hickman disposición servicios gratuitos de asistencia lingüística. Llame al [phone].    We comply with applicable federal civil rights laws and [place] laws. We do not discriminate on the basis of race, color, national origin, age, disability, sex, sexual orientation, or gender  identity.            Thank you!     Thank you for choosing [facility]  for your care. Our goal is always to provide you with excellent care. Hearing back from our patients is one way we can continue to improve our services. Please take a few minutes to complete the written survey that you may receive in the mail after your visit with us. Thank you!             Your Updated Medication List - Protect others around you: Learn how to safely use, store and throw away your medicines at www.disposemymeds.org.          This list is accurate as of 5/2/18  4:27 PM.  Always use your most recent med list.                   Brand Name Dispense Instructions for use Diagnosis    chlorthalidone 25 MG tablet    HYGROTON    90 tablet    Take 0.5 tablets (12.5 mg) by mouth daily    Hypertension goal BP (blood pressure) < 140/90       CLARITIN 10 MG tablet   Generic drug:  loratadine      1 TABLET DAILY PRN        fluticasone 50 MCG/ACT spray    FLONASE    1 Package    Spray 2 sprays into both nostrils daily. Prn    Allergic rhinitis, cause unspecified       metoprolol succinate 25 MG 24 hr tablet    TOPROL-XL    90 tablet    Take 1 tablet (25 mg) by mouth daily    Hypertension goal BP (blood pressure) < 140/90       Multiple vitamin Tabs      1 TABLET DAILY        order for DME     1 Device    Tennis elbow strap    Lateral epicondylitis of left elbow       potassium chloride SA 20 MEQ CR tablet    KLOR-CON    90 tablet    Take 1 tablet (20 mEq) by mouth daily    Hypertension goal BP (blood pressure) < 140/90

## 2018-05-03 LAB — POTASSIUM SERPL-SCNC: 2.7 MMOL/L (ref 3.4–5.3)

## 2018-05-04 DIAGNOSIS — I10 HYPERTENSION GOAL BP (BLOOD PRESSURE) < 140/90: ICD-10-CM

## 2018-05-04 NOTE — TELEPHONE ENCOUNTER
"Requested Prescriptions   Pending Prescriptions Disp Refills     KLOR-CON 20 MEQ CR tablet [Pharmacy Med Name: KLOR-CON M20 TABLET] 90 tablet 0    Last Written Prescription Date:  02/06/2018  #90 x 0  Last filled 02/06/2018  Last office visit: 5/2/2018 JOLENE Terrazas   Future Office Visit:   Next 5 appointments (look out 90 days)     Jul 23, 2018  3:40 PM CDT   Office Visit with Dennise Terrazas PA-C   [facility] ([facility]    [address] [address]   [phone]                  Sig: TAKE 1 TABLET (20 MEQ) BY MOUTH DAILY    Potassium Supplements Protocol Failed    5/4/2018  9:54 AM       Failed - Normal serum potassium in past 12 months    Recent Labs   Lab Test  05/02/18   1626   POTASSIUM  2.7*                   Passed - Recent (12 mo) or future (30 days) visit within the authorizing provider's specialty    Patient had office visit in the last 12 months or has a visit in the next 30 days with authorizing provider or within the authorizing provider's specialty.  See \"Patient Info\" tab in inbasket, or \"Choose Columns\" in Meds & Orders section of the refill encounter.           Passed - Patient is age 18 or older          "

## 2018-05-08 RX ORDER — POTASSIUM CHLORIDE 1500 MG/1
40 TABLET, EXTENDED RELEASE ORAL DAILY
Qty: 180 TABLET | Refills: 0 | Status: SHIPPED | OUTPATIENT
Start: 2018-05-08 | End: 2018-07-26

## 2018-05-08 NOTE — TELEPHONE ENCOUNTER
Routing refill request to provider for review/approval because:  Labs out of range:  Potassium   Date Value Ref Range Status   05/02/2018 2.7 (L) 3.4 - 5.3 mmol/L Final       Routing to provider.    Mercedes HUANG Rn

## 2018-05-23 DIAGNOSIS — I10 HYPERTENSION GOAL BP (BLOOD PRESSURE) < 140/90: ICD-10-CM

## 2018-05-23 LAB — POTASSIUM SERPL-SCNC: 3.2 MMOL/L (ref 3.4–5.3)

## 2018-05-23 PROCEDURE — 36415 COLL VENOUS BLD VENIPUNCTURE: CPT | Performed by: PHYSICIAN ASSISTANT

## 2018-05-23 PROCEDURE — 84132 ASSAY OF SERUM POTASSIUM: CPT | Performed by: PHYSICIAN ASSISTANT

## 2018-05-23 NOTE — LETTER
May 31, 2018      Jena Kamara  [address] [address]          Dear Jena,     Your potassium level has improved nicely, however still just a bit low.  I suggest you go back to 40 mEq of the potassium daily and recheck in 1 month.     Resulted Orders   **Potassium FUTURE anytime   Result Value Ref Range    Potassium 3.2 (L) 3.4 - 5.3 mmol/L     Please follow-up if you have any questions or concerns.     Sincerely,     Dennise Terrazas PA-C/ag

## 2018-05-30 ENCOUNTER — OFFICE VISIT (OUTPATIENT)
Dept: FAMILY MEDICINE | Facility: CLINIC | Age: 53
End: 2018-05-30
Payer: COMMERCIAL

## 2018-05-30 VITALS
HEART RATE: 60 BPM | HEIGHT: 64 IN | SYSTOLIC BLOOD PRESSURE: 132 MMHG | TEMPERATURE: 98.7 F | WEIGHT: 159 LBS | BODY MASS INDEX: 27.14 KG/M2 | DIASTOLIC BLOOD PRESSURE: 90 MMHG

## 2018-05-30 DIAGNOSIS — Z00.00 ROUTINE GENERAL MEDICAL EXAMINATION AT A HEALTH CARE FACILITY: Primary | ICD-10-CM

## 2018-05-30 DIAGNOSIS — Z12.4 ENCOUNTER FOR PAPANICOLAOU SMEAR FOR CERVICAL CANCER SCREENING: ICD-10-CM

## 2018-05-30 PROCEDURE — 87624 HPV HI-RISK TYP POOLED RSLT: CPT | Performed by: PHYSICIAN ASSISTANT

## 2018-05-30 PROCEDURE — 99396 PREV VISIT EST AGE 40-64: CPT | Performed by: PHYSICIAN ASSISTANT

## 2018-05-30 PROCEDURE — G0145 SCR C/V CYTO,THINLAYER,RESCR: HCPCS | Performed by: PHYSICIAN ASSISTANT

## 2018-05-30 NOTE — NURSING NOTE
"Initial BP (!) 154/92  Pulse 60  Temp 98.7  F (37.1  C) (Tympanic)  Ht 5' 4.25\" (1.632 m)  Wt 159 lb (72.1 kg)  LMP 05/13/2018 (Exact Date)  BMI 27.08 kg/m2 Estimated body mass index is 27.08 kg/(m^2) as calculated from the following:    Height as of this encounter: 5' 4.25\" (1.632 m).    Weight as of this encounter: 159 lb (72.1 kg). .    Sayr Lewis CMA(AMAA)    "

## 2018-05-30 NOTE — MR AVS SNAPSHOT
After Visit Summary   5/30/2018    Jena Kamara    MRN: [id number]           Patient Information     Date Of Birth          1965        Visit Information        Provider Department      5/30/2018 3:40 PM Dennise Terrazas PA-C [facility]        Today's Diagnoses     Routine general medical examination at a health care facility    -  1    Encounter for Papanicolaou smear for cervical cancer screening          Care Instructions      Preventive Health Recommendations  Female Ages 50 - 64    Yearly exam: See your health care provider every year in order to  o Review health changes.   o Discuss preventive care.    o Review your medicines if your doctor has prescribed any.      Get a Pap test every three years (unless you have an abnormal result and your provider advises testing more often).    If you get Pap tests with HPV test, you only need to test every 5 years, unless you have an abnormal result.     You do not need a Pap test if your uterus was removed (hysterectomy) and you have not had cancer.    You should be tested each year for STDs (sexually transmitted diseases) if you're at risk.     Have a mammogram every 1 to 2 years.    Have a colonoscopy at age 50, or have a yearly FIT test (stool test). These exams screen for colon cancer.      Have a cholesterol test every 5 years, or more often if advised.    Have a diabetes test (fasting glucose) every three years. If you are at risk for diabetes, you should have this test more often.     If you are at risk for osteoporosis (brittle bone disease), think about having a bone density scan (DEXA).    Shots: Get a flu shot each year. Get a tetanus shot every 10 years.    Nutrition:     Eat at least 5 servings of fruits and vegetables each day.    Eat whole-grain bread, whole-wheat pasta and brown rice instead of white grains and rice.    Talk to your provider about Calcium and Vitamin D.     Lifestyle    Exercise at least 150  minutes a week (30 minutes a day, 5 days a week). This will help you control your weight and prevent disease.    Limit alcohol to one drink per day.    No smoking.     Wear sunscreen to prevent skin cancer.     See your dentist every six months for an exam and cleaning.    See your eye doctor every 1 to 2 years.            Follow-ups after your visit        Your next 10 appointments already scheduled     Jul 23, 2018  3:40 PM CDT   Office Visit with Dennise Terrazas PA-C   [facility] ([facility]    [address] [place] [id number] [phone]           Bring a current list of meds and any records pertaining to this visit. For Physicals, please bring immunization records and any forms needing to be filled out. Please arrive 10 minutes early to complete paperwork.            Nov 12, 2018  3:40 PM CST   Office Visit with Dennise Terrazas PA-C   [facility] ([facility]    [address] [address] [phone]           Bring a current list of meds and any records pertaining to this visit. For Physicals, please bring immunization records and any forms needing to be filled out. Please arrive 10 minutes early to complete paperwork.              Who to contact     Normal or non-critical lab and imaging results will be communicated to you by MyChart, letter or phone within 4 business days after the clinic has received the results. If you do not hear from us within 7 days, please contact the clinic through MyChart or phone. If you have a critical or abnormal lab result, we will notify you by phone as soon as possible.  Submit refill requests through [employer] or call your pharmacy and they will forward the refill request to us. Please allow 3 business days for your refill to be completed.          If you need to speak with a  for additional information , please call: [phone]           Additional  "Information About Your Visit        MyChart Information     [employer] gives you secure access to your electronic health record. If you see a primary care provider, you can also send messages to your care team and make appointments. If you have questions, please call your primary care clinic.  If you do not have a primary care provider, please call [phone] and they will assist you.        Care EveryWhere ID     This is your Care EveryWhere ID. This could be used by other organizations to access your [place] medical records  [id number]        Your Vitals Were     Pulse Temperature Height Last Period BMI (Body Mass Index)       60 98.7  F (37.1  C) (Tympanic) 5' 4.25\" (1.632 m) 05/13/2018 (Exact Date) 27.08 kg/m2        Blood Pressure from Last 3 Encounters:   05/30/18 132/90   05/02/18 120/85   05/02/18 120/85    Weight from Last 3 Encounters:   05/30/18 159 lb (72.1 kg)   05/02/18 161 lb (73 kg)   05/02/18 161 lb (73 kg)              We Performed the Following     HPV High Risk Types DNA Cervical     Pap imaged thin layer screen with HPV - recommended age 30 - 65 years (select HPV order below)        Primary Care Provider Office Phone # Fax #    [facility] [phone] [address] [address]        Equal Access to Services     ART SOLORZANO AH: Haddanny Parra, waaxda luqadaha, qaybta kaalmada pascale bowers. So [facility] [phone].    ATENCIÓN: Si habla español, tiene a hickman disposición servicios gratuitos de asistencia lingüística. Llame al [phone].    We comply with applicable federal civil rights laws and [place] laws. We do not discriminate on the basis of race, color, national origin, age, disability, sex, sexual orientation, or gender identity.            Thank you!     Thank you for choosing [facility]  for your care. Our goal is always to provide you with excellent care. Hearing back " from our patients is one way we can continue to improve our services. Please take a few minutes to complete the written survey that you may receive in the mail after your visit with us. Thank you!             Your Updated Medication List - Protect others around you: Learn how to safely use, store and throw away your medicines at www.disposemymeds.org.          This list is accurate as of 5/30/18 11:59 PM.  Always use your most recent med list.                   Brand Name Dispense Instructions for use Diagnosis    chlorthalidone 25 MG tablet    HYGROTON    90 tablet    Take 0.5 tablets (12.5 mg) by mouth daily    Hypertension goal BP (blood pressure) < 140/90       CLARITIN 10 MG tablet   Generic drug:  loratadine      1 TABLET DAILY PRN        fluticasone 50 MCG/ACT spray    FLONASE    1 Package    Spray 2 sprays into both nostrils daily. Prn    Allergic rhinitis, cause unspecified       metoprolol succinate 25 MG 24 hr tablet    TOPROL-XL    90 tablet    Take 1 tablet (25 mg) by mouth daily    Hypertension goal BP (blood pressure) < 140/90       Multiple vitamin Tabs      1 TABLET DAILY        order for DME     1 Device    Tennis elbow strap    Lateral epicondylitis of left elbow       potassium chloride SA 20 MEQ CR tablet    KLOR-CON    180 tablet    Take 2 tablets (40 mEq) by mouth daily    Hypertension goal BP (blood pressure) < 140/90

## 2018-05-30 NOTE — PROGRESS NOTES
SUBJECTIVE:   CC: Jena Kamara is an 52 year old woman who presents for preventive health visit.     Healthy Habits:    Do you get at least three servings of calcium containing foods daily (dairy, green leafy vegetables, etc.)? yes    Amount of exercise or daily activities, outside of work: 45mins/day    Problems taking medications regularly No    Medication side effects: No    Have you had an eye exam in the past two years? yes    Do you see a dentist twice per year? yes    Do you have sleep apnea, excessive snoring or daytime drowsiness?no    Currently in the process of moving, wants to wait on colonoscopy.         Today's PHQ-2 Score:   PHQ-2 ( 1999 [employer]) 10/25/2017 11/6/2012   Q1: Little interest or pleasure in doing things 0 0   Q2: Feeling down, depressed or hopeless 0 0   PHQ-2 Score 0 0       Abuse: Current or Past(Physical, Sexual or Emotional)- No  Do you feel safe in your environment - Yes    Social History   Substance Use Topics     Smoking status: Never Smoker     Smokeless tobacco: Never Used     Alcohol use Yes      Comment: 1 drink per month     If you drink alcohol do you typically have >3 drinks per day or >7 drinks per week? No                     Reviewed orders with patient.  Reviewed health maintenance and updated orders accordingly - Yes  Labs reviewed in EPIC  BP Readings from Last 3 Encounters:   05/30/18 132/90   05/02/18 120/85   05/02/18 120/85    Wt Readings from Last 3 Encounters:   05/30/18 159 lb (72.1 kg)   05/02/18 161 lb (73 kg)   05/02/18 161 lb (73 kg)                  Current Outpatient Prescriptions   Medication Sig Dispense Refill     chlorthalidone (HYGROTON) 25 MG tablet Take 0.5 tablets (12.5 mg) by mouth daily 90 tablet 1     CLARITIN 10 MG OR TABS 1 TABLET DAILY PRN       fluticasone (FLONASE) 50 MCG/ACT nasal spray Spray 2 sprays into both nostrils daily. Prn   1 Package 11     metoprolol succinate (TOPROL-XL) 25 MG 24 hr tablet Take 1 tablet (25 mg) by mouth daily  "90 tablet 1     MULTIPLE VITAMIN OR TABS 1 TABLET DAILY       order for DME Tennis elbow strap 1 Device 0     potassium chloride SA (KLOR-CON) 20 MEQ CR tablet Take 2 tablets (40 mEq) by mouth daily 180 tablet 0       Patient over age 50, mutual decision to screen reflected in health maintenance.    Pertinent mammograms are reviewed under the imaging tab.  History of abnormal Pap smear:   Last 3 Pap and HPV Results:   PAP / HPV 5/30/2018 11/6/2012 11/1/2007   PAP NIL NIL NIL       Reviewed and updated as needed this visit by clinical staff  Tobacco  Allergies  Meds  Med Hx  Surg Hx  Fam Hx  Soc Hx        Reviewed and updated as needed this visit by Provider            ROS:  CONSTITUTIONAL: NEGATIVE for fever, chills, change in weight  INTEGUMENTARY/SKIN: NEGATIVE for worrisome rashes, moles or lesions  EYES: NEGATIVE for vision changes or irritation  ENT: NEGATIVE for ear, mouth and throat problems  RESP: NEGATIVE for significant cough or SOB  BREAST: NEGATIVE for masses, tenderness or discharge  CV: NEGATIVE for chest pain, palpitations or peripheral edema  GI: NEGATIVE for nausea, abdominal pain, heartburn, or change in bowel habits  : NEGATIVE for unusual urinary or vaginal symptoms. No vaginal bleeding.  MUSCULOSKELETAL: NEGATIVE for significant arthralgias or myalgia  NEURO: NEGATIVE for weakness, dizziness or paresthesias  PSYCHIATRIC: NEGATIVE for changes in mood or affect     OBJECTIVE:   /90  Pulse 60  Temp 98.7  F (37.1  C) (Tympanic)  Ht 5' 4.25\" (1.632 m)  Wt 159 lb (72.1 kg)  LMP 05/13/2018 (Exact Date)  BMI 27.08 kg/m2  EXAM:  GENERAL: healthy, alert and no distress  EYES: Eyes grossly normal to inspection, PERRL and conjunctivae and sclerae normal  HENT: ear canals and TM's normal, nose and mouth without ulcers or lesions  NECK: no adenopathy, no asymmetry, masses, or scars and thyroid normal to palpation  RESP: lungs clear to auscultation - no rales, rhonchi or wheezes  BREAST: " "normal without masses, tenderness or nipple discharge and no palpable axillary masses or adenopathy  CV: regular rate and rhythm, normal S1 S2, no S3 or S4, no murmur, click or rub, no peripheral edema and peripheral pulses strong  ABDOMEN: soft, nontender, no hepatosplenomegaly, no masses and bowel sounds normal   (female): normal female external genitalia, normal urethral meatus, vaginal mucosa pink, moist, well rugated, and normal cervix/adnexa/uterus without masses or discharge  MS: no gross musculoskeletal defects noted, no edema  SKIN: no suspicious lesions or rashes  NEURO: Normal strength and tone, mentation intact and speech normal  PSYCH: mentation appears normal, affect normal/bright    ASSESSMENT/PLAN:   1. Routine general medical examination at a health care facility       HEALTH CARE MAINTENANCE              Reviewed USPTF recommendations and anticipatory guidance.              See orders.        Due for colonoscopy, she would like to wait until after they move before getting this done (maybe in about 6 months).  Does not want the FIT test.     2. Encounter for Papanicolaou smear for cervical cancer screening  Due for pap, recheck in 5 years if normal.    - HPV High Risk Types DNA Cervical  - Pap imaged thin layer screen with HPV - recommended age 30 - 65 years (select HPV order below)    COUNSELING:   Reviewed preventive health counseling, as reflected in patient instructions       Regular exercise       Healthy diet/nutrition         reports that she has never smoked. She has never used smokeless tobacco.    Estimated body mass index is 27.08 kg/(m^2) as calculated from the following:    Height as of this encounter: 5' 4.25\" (1.632 m).    Weight as of this encounter: 159 lb (72.1 kg).       Counseling Resources:  ATP IV Guidelines  Pooled Cohorts Equation Calculator  Breast Cancer Risk Calculator  FRAX Risk Assessment  ICSI Preventive Guidelines  Dietary Guidelines for Americans, 2010  USDA's " MyPlate  ASA Prophylaxis  Lung CA Screening    Dennise Terrazas PA-C  [facility]

## 2018-06-01 LAB
COPATH REPORT: NORMAL
PAP: NORMAL

## 2018-06-05 LAB
FINAL DIAGNOSIS: NORMAL
HPV HR 12 DNA CVX QL NAA+PROBE: NEGATIVE
HPV16 DNA SPEC QL NAA+PROBE: NEGATIVE
HPV18 DNA SPEC QL NAA+PROBE: NEGATIVE
SPECIMEN DESCRIPTION: NORMAL
SPECIMEN SOURCE CVX/VAG CYTO: NORMAL

## 2018-07-23 ENCOUNTER — OFFICE VISIT (OUTPATIENT)
Dept: FAMILY MEDICINE | Facility: CLINIC | Age: 53
End: 2018-07-23
Payer: COMMERCIAL

## 2018-07-23 ENCOUNTER — TELEPHONE (OUTPATIENT)
Dept: FAMILY MEDICINE | Facility: CLINIC | Age: 53
End: 2018-07-23

## 2018-07-23 VITALS
HEIGHT: 64 IN | BODY MASS INDEX: 26.87 KG/M2 | TEMPERATURE: 97.8 F | OXYGEN SATURATION: 97 % | WEIGHT: 157.4 LBS | SYSTOLIC BLOOD PRESSURE: 144 MMHG | HEART RATE: 57 BPM | DIASTOLIC BLOOD PRESSURE: 90 MMHG

## 2018-07-23 DIAGNOSIS — I10 HYPERTENSION GOAL BP (BLOOD PRESSURE) < 140/90: ICD-10-CM

## 2018-07-23 LAB — POTASSIUM SERPL-SCNC: 3 MMOL/L (ref 3.4–5.3)

## 2018-07-23 PROCEDURE — 99213 OFFICE O/P EST LOW 20 MIN: CPT | Performed by: PHYSICIAN ASSISTANT

## 2018-07-23 PROCEDURE — 84132 ASSAY OF SERUM POTASSIUM: CPT | Performed by: PHYSICIAN ASSISTANT

## 2018-07-23 PROCEDURE — 36415 COLL VENOUS BLD VENIPUNCTURE: CPT | Performed by: PHYSICIAN ASSISTANT

## 2018-07-23 RX ORDER — AMLODIPINE BESYLATE 5 MG/1
5 TABLET ORAL DAILY
Qty: 30 TABLET | Refills: 2 | Status: SHIPPED | OUTPATIENT
Start: 2018-07-23 | End: 2018-10-19

## 2018-07-23 RX ORDER — CHLORTHALIDONE 25 MG/1
12.5 TABLET ORAL DAILY
Qty: 45 TABLET | Refills: 0 | Status: SHIPPED | OUTPATIENT
Start: 2018-07-23 | End: 2018-11-12

## 2018-07-23 RX ORDER — METOPROLOL SUCCINATE 25 MG/1
25 TABLET, EXTENDED RELEASE ORAL DAILY
Qty: 90 TABLET | Refills: 0 | Status: SHIPPED | OUTPATIENT
Start: 2018-07-23 | End: 2018-11-12

## 2018-07-23 NOTE — PATIENT INSTRUCTIONS
[facility] Hypertension Study  Visit 7     Thank you for your continued participation in the hypertension study!  Please continue taking your hypertension medications as directed. Your next visit will be in four months and will be scheduled for you in the coming days. After it is scheduled, be sure to let the research coordinator know as soon as possible if you cannot make it so that the visit can be rescheduled for you.     Please contact the research coordinator if you receive care for your hypertension outside of your study visits.    If you have any questions, please contact the research coordinator at [phone]. If you experience any symptoms please call the [place] 24/7 triage number at [phone]. If your phone number, email or address changes please alert the research coordinator.     In the meantime, we ask that you complete the online surveys emailed out to you, if completing online, or mailed out to you, if completing paper surveys, before your next visit.    Lifestyle changes that can help control high blood pressure:  Even though [facility] is a study to test effectiveness of genetically guided medications for managing high blood pressure, there are several things you can do to ensure your blood pressure stays in good control:    Maintain a healthy weight (BMI<26). A modest amount of weight loss can be helpful    Limit salt intake to under 2400mg daily    Follow the DASH diet (lean meats, low salt, whole grains, lots of fruits/vegies)    Stay active, try to get in 30 minutes of exercise daily.    Manage your daily stress.    Do not smoke cigarettes (or cut back)    Limit alcohol (2 drinks/day for men, 1 drink/day for women)

## 2018-07-23 NOTE — NURSING NOTE
"Initial /90  Pulse 57  Temp 97.8  F (36.6  C) (Tympanic)  Ht 5' 4.25\" (1.632 m)  Wt 157 lb 6.4 oz (71.4 kg)  SpO2 97%  BMI 26.81 kg/m2 Estimated body mass index is 26.81 kg/(m^2) as calculated from the following:    Height as of this encounter: 5' 4.25\" (1.632 m).    Weight as of this encounter: 157 lb 6.4 oz (71.4 kg). .    "

## 2018-07-23 NOTE — Clinical Note
FYI, medication was changed even though she is on visit 7 (she is on low doses of two meds).  Next check with [facility] is not for 4 months (I would typically recheck in 1 month.  What's the protocol for something like this).

## 2018-07-23 NOTE — MR AVS SNAPSHOT
After Visit Summary   7/23/2018    Jena Kamara    MRN: [id number]           Patient Information     Date Of Birth          1965        Visit Information        Provider Department      7/23/2018 3:40 PM Dennise Terrazas PA-C [facility]        Today's Diagnoses     Hypertension goal BP (blood pressure) < 140/90          Care Instructions    [facility] Hypertension Study  Visit 7     Thank you for your continued participation in the hypertension study!  Please continue taking your hypertension medications as directed. Your next visit will be in four months and will be scheduled for you in the coming days. After it is scheduled, be sure to let the research coordinator know as soon as possible if you cannot make it so that the visit can be rescheduled for you.     Please contact the research coordinator if you receive care for your hypertension outside of your study visits.    If you have any questions, please contact the research coordinator at [phone]. If you experience any symptoms please call the [place] 24/7 triage number at [phone]. If your phone number, email or address changes please alert the research coordinator.     In the meantime, we ask that you complete the online surveys emailed out to you, if completing online, or mailed out to you, if completing paper surveys, before your next visit.    Lifestyle changes that can help control high blood pressure:  Even though [facility] is a study to test effectiveness of genetically guided medications for managing high blood pressure, there are several things you can do to ensure your blood pressure stays in good control:    Maintain a healthy weight (BMI<26). A modest amount of weight loss can be helpful    Limit salt intake to under 2400mg daily    Follow the DASH diet (lean meats, low salt, whole grains, lots of fruits/vegies)    Stay active, try to get in 30 minutes of exercise daily.    Manage your daily stress.    Do  "not smoke cigarettes (or cut back)    Limit alcohol (2 drinks/day for men, 1 drink/day for women)            Follow-ups after your visit        Your next 10 appointments already scheduled     Jul 25, 2018  2:30 PM CDT   MA SCREENING DIGITAL BILATERAL with [id number]   [facility] ([facility])    [address]  [facility] [address] [phone]           Do not use any powder, lotion or deodorant under your arms or on your breast. If you do, we will ask you to remove it before your exam.  Wear comfortable, two-piece clothing.  If you have any allergies, tell your care team.  Bring any previous mammograms from other facilities or have them mailed to the breast center. Three-dimensional (3D) mammograms are available at [address] locations in [facility], [facility], [facility], and [place]. [facility] locations include [place] [facility] in [place]. Benefits of 3D mammograms include: - Improved rate of cancer detection - Decreases your chance of having to go back for more tests, which means fewer: - \"False-positive\" results (This means that there is an abnormal area but it isn't cancer.) - Invasive testing procedures, such as a biopsy or surgery - Can provide clearer images of the breast if you have dense breast tissue. 3D mammography is an optional exam that anyone can have with a 2D mammogram. It doesn't replace or take the place of a 2D mammogram. 2D mammograms remain an effective screening test for all women.  Not all insurance companies cover the cost of a 3D mammogram. Check with your insurance.            Nov 12, 2018  3:40 PM CST   [facility]ennise [facility]    [address] [address] [phone]           Bring a current list of meds and any records pertaining to this visit. For Physicals, please bring immunization " "records and any forms needing to be filled out. Please arrive 10 minutes early to complete paperwork.              Who to contact     Normal or non-critical lab and imaging results will be communicated to you by [employer], letter or phone within 4 business days after the clinic has received the results. If you do not hear from us within 7 days, please contact the clinic through [employer] or phone. If you have a critical or abnormal lab result, we will notify you by phone as soon as possible.  Submit refill requests through [employer] or call your pharmacy and they will forward the refill request to us. Please allow 3 business days for your refill to be completed.          If you need to speak with a  for additional information , please call: [phone]           Additional Information About Your Visit        [employer] Information     [employer] gives you secure access to your electronic health record. If you see a primary care provider, you can also send messages to your care team and make appointments. If you have questions, please call your primary care clinic.  If you do not have a primary care provider, please call [phone] and they will assist you.        Care EveryWhere ID     This is your Care EveryWhere ID. This could be used by other organizations to access your [place] medical records  [id number]        Your Vitals Were     Pulse Temperature Height Pulse Oximetry BMI (Body Mass Index)       57 97.8  F (36.6  C) (Tympanic) 5' 4.25\" (1.632 m) 97% 26.81 kg/m2        Blood Pressure from Last 3 Encounters:   07/23/18 144/90   05/30/18 132/90   05/02/18 120/85    Weight from Last 3 Encounters:   07/23/18 157 lb 6.4 oz (71.4 kg)   05/30/18 159 lb (72.1 kg)   05/02/18 161 lb (73 kg)              We Performed the Following     Potassium          Today's Medication Changes          These changes are accurate as of 7/23/18  4:17 PM.  If you have any questions, ask your nurse or doctor.             "   Start taking these medicines.        Dose/Directions    amLODIPine 5 MG tablet   Commonly known as:  NORVASC   Used for:  Hypertension goal BP (blood pressure) < 140/90   Started by:  Dennise Terrazas PA-C        Dose:  5 mg   Take 1 tablet (5 mg) by mouth daily   Quantity:  30 tablet   Refills:  2            Where to get your medicines      These medications were sent to [facility] [address] [facility] - [address]  [address] [facility] [address]     Phone:  [phone]     amLODIPine 5 MG tablet    chlorthalidone 25 MG tablet    metoprolol succinate 25 MG 24 hr tablet                Primary Care Provider Office Phone # Fax #    [facility] [phone] [address] [facility] [address]        Equal Access to Services     ART SOLORZANO : Hadii ivet beverlyo Soanca, waaxda luqadaha, qaybta kaalmada adeegyada, pascale castillo. So [facility] [phone].    ATENCIÓN: Si habla español, tiene a hickman disposición servicios gratuitos de asistencia lingüística. Rebaame al [phone].    We comply with applicable federal civil rights laws and [place] laws. We do not discriminate on the basis of race, color, national origin, age, disability, sex, sexual orientation, or gender identity.            Thank you!     Thank you for choosing [facility]  for your care. Our goal is always to provide you with excellent care. Hearing back from our patients is one way we can continue to improve our services. Please take a few minutes to complete the written survey that you may receive in the mail after your visit with us. Thank you!             Your Updated Medication List - Protect others around you: Learn how to safely use, store and throw away your medicines at www.disposemymeds.org.          This list is accurate as of 7/23/18  4:17 PM.  Always use your most recent med list.                   Brand Name Dispense Instructions for  use Diagnosis    amLODIPine 5 MG tablet    NORVASC    30 tablet    Take 1 tablet (5 mg) by mouth daily    Hypertension goal BP (blood pressure) < 140/90       chlorthalidone 25 MG tablet    HYGROTON    45 tablet    Take 0.5 tablets (12.5 mg) by mouth daily    Hypertension goal BP (blood pressure) < 140/90       CLARITIN 10 MG tablet   Generic drug:  loratadine      1 TABLET DAILY PRN        fluticasone 50 MCG/ACT spray    FLONASE    1 Package    Spray 2 sprays into both nostrils daily. Prn    Allergic rhinitis, cause unspecified       metoprolol succinate 25 MG 24 hr tablet    TOPROL-XL    90 tablet    Take 1 tablet (25 mg) by mouth daily    Hypertension goal BP (blood pressure) < 140/90       Multiple vitamin Tabs      1 TABLET DAILY        potassium chloride SA 20 MEQ CR tablet    KLOR-CON    180 tablet    Take 2 tablets (40 mEq) by mouth daily    Hypertension goal BP (blood pressure) < 140/90

## 2018-07-23 NOTE — PROGRESS NOTES
PGEN study visit  UNCONTROLLED HYPERTENSION ARM visit 7    SUBJECTIVE:  Jena is here for 7th PGEN research study visit. Please refer to research tab in the header and today's flow sheet for further details. Patient had uncontrolled  hypertension at enrollment and has a goal of <  140 /90 mmHg (per Problem list target chosen by PCP)     Prior research note reviewed. Patient is on blood pressure medication(s) at this time.  she has been taking chlorthalidone 12.5 mg (did not tolerate higher dose) and metoprolol 25 mg (did not tolerate higher dose) and is tolerating the medications at there current doses well.      Current diet & lifestyle: watching salt  Smoking: no  Alcohol consumption rare  Other pertinent history   Had a day of low blood pressure (101/62 mmHg).  Sometimes will feel dizzy if she bends over.  No LOC.    Home blood pressures 120/76 mmHg.  Has not been checking as often.       BP Readings from Last 3 Encounters:   07/23/18 144/90   05/30/18 132/90   05/02/18 120/85       Current Outpatient Prescriptions   Medication Sig Dispense Refill     chlorthalidone (HYGROTON) 25 MG tablet Take 0.5 tablets (12.5 mg) by mouth daily 90 tablet 1     metoprolol succinate (TOPROL-XL) 25 MG 24 hr tablet Take 1 tablet (25 mg) by mouth daily 90 tablet 1     MULTIPLE VITAMIN OR TABS 1 TABLET DAILY       potassium chloride SA (KLOR-CON) 20 MEQ CR tablet Take 2 tablets (40 mEq) by mouth daily 180 tablet 0     CLARITIN 10 MG OR TABS 1 TABLET DAILY PRN       fluticasone (FLONASE) 50 MCG/ACT nasal spray Spray 2 sprays into both nostrils daily. Prn   (Patient not taking: Reported on 7/23/2018) 1 Package 11     Potassium   Date Value Ref Range Status   05/23/2018 3.2 (L) 3.4 - 5.3 mmol/L Final     Creatinine   Date Value Ref Range Status   01/08/2018 0.63 0.52 - 1.04 mg/dL Final     Urea Nitrogen   Date Value Ref Range Status   01/08/2018 15 7 - 30 mg/dL Final     GFR Estimate   Date Value Ref Range Status   01/08/2018 >90 >60  "mL/min/1.7m2 Final     Comment:     Non  GFR Calc      A baseline potassium, creatinine, BUN, GFR has been done within past 12 months      OBJECTIVE:  Patient in in no apparent distress and able to provide full history for today's encounter. she denies pain or any current illness   Vitals: /90  Pulse 57  Temp 97.8  F (36.6  C) (Tympanic)  Ht 5' 4.25\" (1.632 m)  Wt 157 lb 6.4 oz (71.4 kg)  SpO2 97%  BMI 26.81 kg/m2  Today's BP completed using cuff size: regular on  arm.    Pulse Readings from Last 1 Encounters:   07/23/18 57     Is pulse 55 or greater? - Yes    BMI= Body mass index is 26.81 kg/(m^2).  Other exam findings Cardiovascular:  Rhythm regular, normal S1 and S2.  No murmur, gallop or clicks noted.   No edema noted in lower extremities.          ASSESSMENT/PLAN:   MAGIN Flowsheet has been  'filed' ) for this visit (this saves flowsheet data)    Blood pressure reading today is not at the provider specified goal of <140/90.      1.  Based on VICKI RN HTN MGMT standing order the patient will be advised begin: amlodipine 5 mg daily.   Will monitor home BP's, as they are typically better controlled at home compared to in clinic.  At times having some lower BP readings.  Has not checked BP in 2 weeks.     2.  We will not be checking a metabolic lab panel today.  Check potassium today  3.  Follow up instructions include:     Next Provider visit: Follow up in 4 months.  (she will monitor her home BP's and notify me over mychart)    See avs for more details.  Full research packet also provide to patient previously  Our research team will reach out to patient to schedule follow up visit as well.   Patient was counseled regarding the lifestyle changes (listed below)  to help with BP management Yes  Lifestyle changes that can help control high blood pressure:  Even though PGEN is a study to test effectiveness of genetically guided medications for managing high blood pressure, there are several " things you can do to ensure your blood pressure stays in good control:    Maintain a healthy weight (BMI<26). A modest amount of weight loss can be helpful    Limit salt intake to under 2400mg daily    Follow the DASH diet (lean meats, low salt, whole grains, lots of fruits/vegies)    Stay active, try to get in 30 minutes of exercise daily.    Manage your daily stress.    Do not smoke cigarettes (or cut back)    Limit alcohol (2 drinks/day for men, 1 drink/day for women)  Was AVS  provided to patient with the relevant <dot>PGENPI dot phrase pulled into patient instructions Yes    Dennise Terrazas PA-C

## 2018-07-25 ENCOUNTER — HOSPITAL ENCOUNTER (OUTPATIENT)
Dept: MAMMOGRAPHY | Facility: CLINIC | Age: 53
Discharge: HOME | End: 2018-07-25
Attending: FAMILY MEDICINE | Admitting: FAMILY MEDICINE
Payer: COMMERCIAL

## 2018-07-25 DIAGNOSIS — Z12.31 VISIT FOR SCREENING MAMMOGRAM: ICD-10-CM

## 2018-07-25 PROCEDURE — 77067 SCR MAMMO BI INCL CAD: CPT

## 2018-07-26 RX ORDER — POTASSIUM CHLORIDE 1500 MG/1
TABLET, EXTENDED RELEASE ORAL
Qty: 270 TABLET | Refills: 0 | Status: SHIPPED | OUTPATIENT
Start: 2018-07-26 | End: 2018-10-29

## 2018-10-19 DIAGNOSIS — I10 HYPERTENSION GOAL BP (BLOOD PRESSURE) < 140/90: ICD-10-CM

## 2018-10-19 RX ORDER — AMLODIPINE BESYLATE 5 MG/1
TABLET ORAL
Qty: 30 TABLET | Refills: 0 | Status: SHIPPED | OUTPATIENT
Start: 2018-10-19 | End: 2018-11-12

## 2018-10-19 NOTE — TELEPHONE ENCOUNTER
"Requested Prescriptions   Pending Prescriptions Disp Refills     amLODIPine (NORVASC) 5 MG tablet [Pharmacy Med Name: AMLODIPINE BESYLATE 5 MG TAB] 30 tablet 2    Last Written Prescription Date:  07/23/2018 #30 x 2  Last filled 09/16/2018  Last office visit: 7/23/2018 JOLENE Terrazas     Future Office Visit:   Next 5 appointments (look out 90 days)     Nov 12, 2018  3:40 PM CST   Office Visit with Dennise Terrazas PA-C   [facility]    [address] [address]   [phone]                  Sig: TAKE 1 TABLET BY MOUTH EVERY DAY    Calcium Channel Blockers Protocol  Failed    10/19/2018  2:19 AM       Failed - Blood pressure under 140/90 in past 12 months    BP Readings from Last 3 Encounters:   07/23/18 144/90   05/30/18 132/90   05/02/18 120/85                Passed - Recent (12 mo) or future (30 days) visit within the authorizing provider's specialty    Patient had office visit in the last 12 months or has a visit in the next 30 days with authorizing provider or within the authorizing provider's specialty.  See \"Patient Info\" tab in inbasket, or \"Choose Columns\" in Meds & Orders section of the refill encounter.             Passed - Patient is age 18 or older       Passed - No active pregnancy on record       Passed - Normal serum creatinine on file in past 12 months    Recent Labs   Lab Test  01/08/18   1632   CR  0.63            Passed - No positive pregnancy test in past 12 months          "

## 2018-10-29 DIAGNOSIS — I10 HYPERTENSION GOAL BP (BLOOD PRESSURE) < 140/90: ICD-10-CM

## 2018-10-29 NOTE — TELEPHONE ENCOUNTER
"Requested Prescriptions   Pending Prescriptions Disp Refills     KLOR-CON 20 MEQ CR tablet [Pharmacy Med Name: KLOR-CON M20 TABLET] 270 tablet 0    Last Written Prescription Date:  07/26/2018  #270 x 0  Last filled 07/26/2018  Last office visit: 7/23/2018 JOLENE Terrazas     Future Office Visit:   Next 5 appointments (look out 90 days)     Nov 12, 2018  3:40 PM CST   Office Visit with Dennise Terrazas PA-C   [facility]    [address] [address]   [phone]                  Sig: TAKE 2 TABS BY MOUTH IN THE AM AND 1 TAB IN THE PM    Potassium Supplements Protocol Failed    10/29/2018 10:20 AM       Failed - Normal serum potassium in past 12 months    Recent Labs   Lab Test  07/23/18   1621   POTASSIUM  3.0*                   Passed - Recent (12 mo) or future (30 days) visit within the authorizing provider's specialty    Patient had office visit in the last 12 months or has a visit in the next 30 days with authorizing provider or within the authorizing provider's specialty.  See \"Patient Info\" tab in inbasket, or \"Choose Columns\" in Meds & Orders section of the refill encounter.             Passed - Patient is age 18 or older          "

## 2018-10-30 RX ORDER — POTASSIUM CHLORIDE 1500 MG/1
TABLET, EXTENDED RELEASE ORAL
Qty: 270 TABLET | Refills: 0 | Status: SHIPPED | OUTPATIENT
Start: 2018-10-30 | End: 2018-11-12

## 2018-10-30 NOTE — TELEPHONE ENCOUNTER
Routing refill request to provider for review/approval because:  Labs out of range:  K+    Odalys ARIAS RN

## 2018-11-12 ENCOUNTER — OFFICE VISIT (OUTPATIENT)
Dept: FAMILY MEDICINE | Facility: CLINIC | Age: 53
End: 2018-11-12
Payer: COMMERCIAL

## 2018-11-12 VITALS
TEMPERATURE: 98.5 F | OXYGEN SATURATION: 100 % | WEIGHT: 166 LBS | SYSTOLIC BLOOD PRESSURE: 124 MMHG | BODY MASS INDEX: 28.27 KG/M2 | DIASTOLIC BLOOD PRESSURE: 78 MMHG | HEART RATE: 50 BPM

## 2018-11-12 DIAGNOSIS — I10 HYPERTENSION GOAL BP (BLOOD PRESSURE) < 140/90: ICD-10-CM

## 2018-11-12 PROCEDURE — 99213 OFFICE O/P EST LOW 20 MIN: CPT | Performed by: PHYSICIAN ASSISTANT

## 2018-11-12 RX ORDER — AMLODIPINE BESYLATE 5 MG/1
5 TABLET ORAL DAILY
Qty: 90 TABLET | Refills: 1 | Status: SHIPPED | OUTPATIENT
Start: 2018-11-12 | End: 2019-05-13

## 2018-11-12 RX ORDER — AMLODIPINE BESYLATE 5 MG/1
5 TABLET ORAL DAILY
Qty: 30 TABLET | Refills: 0 | Status: CANCELLED | OUTPATIENT
Start: 2018-11-12

## 2018-11-12 RX ORDER — METOPROLOL SUCCINATE 25 MG/1
25 TABLET, EXTENDED RELEASE ORAL DAILY
Qty: 90 TABLET | Refills: 1 | Status: SHIPPED | OUTPATIENT
Start: 2018-11-12 | End: 2019-02-15 | Stop reason: SINTOL

## 2018-11-12 NOTE — MR AVS SNAPSHOT
After Visit Summary   11/12/2018    Jena Kamara    MRN: [id number]           Patient Information     Date Of Birth          1965        Visit Information        Provider Department      11/12/2018 3:40 PM Dennise [facility]razas PA-C [facility]  Visit 8     Thank you for your participation in the hypertension study!     Please continue taking your hypertension medications as directed and follow up with your physician as directed. If you have any questions, please contact your physician s office.    You have been given the results of your genetic profile for your own records.  Please contact the research coordinator at [phone] if you have any questions related to the study.      Please contact your doctor/provider with any other health related questions.                Follow-ups after your visit        Who to contact     Normal or non-critical lab and imaging results will be communicated to you by [employer], letter or phone within 4 business days after the clinic has received the results. If you do not hear from us within 7 days, please contact the clinic through [employer] or phone. If you have a critical or abnormal lab result, we will notify you by phone as soon as possible.  Submit refill requests through [employer] or call your pharmacy and they will forward the refill request to us. Please allow 3 business days for your refill to be completed.          If you need to speak with a  for additional information , please call: [phone]           Additional Information About Your Visit        [employer] Information     [employer] gives you secure access to your electronic health record. If you see a primary care provider, you can also send messages to your care team and make appointments. If you have questions, please call your primary care clinic.  If you do not have a primary care provider, please call [phone]  and they will assist you.        Care EveryWhere ID     This is your Care EveryWhere ID. This could be used by other organizations to access your [place] medical records  [id number]        Your Vitals Were     Pulse Temperature Pulse Oximetry BMI (Body Mass Index)          50 98.5  F (36.9  C) (Tympanic) 100% 28.27 kg/m2         Blood Pressure from Last 3 Encounters:   11/12/18 124/78   07/23/18 144/90   05/30/18 132/90    Weight from Last 3 Encounters:   11/12/18 166 lb (75.3 kg)   07/23/18 157 lb 6.4 oz (71.4 kg)   05/30/18 159 lb (72.1 kg)              Today, you had the following     No orders found for display       Primary Care Provider Office Phone # Fax #    [facility] [phone] [address] [address]        Equal Access to Services     ART SOLORZANO : Hadii ivet beverlyo Soanca, waaxda luqadaha, qaybta kaalmada adeegyada, pascale castillo. So [facility] [phone].    ATENCIÓN: Si habla español, tiene a hickman disposición servicios gratuitos de asistencia lingüística. Llame al [phone].    We comply with applicable federal civil rights laws and [place] laws. We do not discriminate on the basis of race, color, national origin, age, disability, sex, sexual orientation, or gender identity.            Thank you!     Thank you for choosing [facility]  for your care. Our goal is always to provide you with excellent care. Hearing back from our patients is one way we can continue to improve our services. Please take a few minutes to complete the written survey that you may receive in the mail after your visit with us. Thank you!             Your Updated Medication List - Protect others around you: Learn how to safely use, store and throw away your medicines at www.disposemymeds.org.          This list is accurate as of 11/12/18  3:48 PM.  Always use your most recent med list.                   Brand Name Dispense  Instructions for use Diagnosis    amLODIPine 5 MG tablet    NORVASC    30 tablet    TAKE 1 TABLET BY MOUTH EVERY DAY    Hypertension goal BP (blood pressure) < 140/90       chlorthalidone 25 MG tablet    HYGROTON    45 tablet    Take 0.5 tablets (12.5 mg) by mouth daily    Hypertension goal BP (blood pressure) < 140/90       CLARITIN 10 MG tablet   Generic drug:  loratadine      1 TABLET DAILY PRN        fluticasone 50 MCG/ACT spray    FLONASE    1 Package    Spray 2 sprays into both nostrils daily. Prn    Allergic rhinitis, cause unspecified       KLOR-CON 20 MEQ CR tablet   Generic drug:  potassium chloride SA     270 tablet    TAKE 2 TABS BY MOUTH IN THE AM AND 1 TAB IN THE PM    Hypertension goal BP (blood pressure) < 140/90       metoprolol succinate 25 MG 24 hr tablet    TOPROL-XL    90 tablet    Take 1 tablet (25 mg) by mouth daily    Hypertension goal BP (blood pressure) < 140/90       Multiple vitamin Tabs      1 TABLET DAILY

## 2018-11-12 NOTE — PATIENT INSTRUCTIONS
[employer] Hypertension Study  Visit 8     Thank you for your participation in the hypertension study!     Please continue taking your hypertension medications as directed and follow up with your physician as directed. If you have any questions, please contact your physician s office.    You have been given the results of your genetic profile for your own records.  Please contact the research coordinator at [phone] if you have any questions related to the study.      Please contact your doctor/provider with any other health related questions.

## 2018-11-12 NOTE — PROGRESS NOTES
PGEN study visit  UNCONTROLLED HYPERTENSION ARM visit 8    SUBJECTIVE:  Jena is here for 8th PGEN research study visit. Please refer to research tab in the header and today's flow sheet for further details. Patient had uncontrolled  hypertension at enrollment and has a goal of <  140 /90 mmHg (per Problem list target chosen by PCP). Home blood pressures have actually been lower (/60-80's mmHg).  At times she feels dizzy and dehydrated.  She stopped the chlorthalidone 10 days ago (and is down to 1 potassium supplement per day, was at 3 per day).   Symptoms have improved since being off the chlorthalidone.     Prior research note reviewed. Patient is on blood pressure medication(s) at this time.  she has been taking metoprolol and amlodipine and is tolerating the medication well.      Current diet & lifestyle: no changes  Smoking: no  Alcohol consumption no  Other pertinent history none        BP Readings from Last 3 Encounters:   11/12/18 124/78   07/23/18 144/90   05/30/18 132/90       Current Outpatient Prescriptions   Medication Sig Dispense Refill     amLODIPine (NORVASC) 5 MG tablet Take 1 tablet (5 mg) by mouth daily 90 tablet 1     CLARITIN 10 MG OR TABS 1 TABLET DAILY PRN       fluticasone (FLONASE) 50 MCG/ACT nasal spray Spray 2 sprays into both nostrils daily. Prn   1 Package 11     metoprolol succinate (TOPROL-XL) 25 MG 24 hr tablet Take 1 tablet (25 mg) by mouth daily 90 tablet 1     MULTIPLE VITAMIN OR TABS 1 TABLET DAILY       [DISCONTINUED] amLODIPine (NORVASC) 5 MG tablet TAKE 1 TABLET BY MOUTH EVERY DAY 30 tablet 0     [DISCONTINUED] metoprolol succinate (TOPROL-XL) 25 MG 24 hr tablet Take 1 tablet (25 mg) by mouth daily 90 tablet 0     Potassium   Date Value Ref Range Status   07/23/2018 3.0 (L) 3.4 - 5.3 mmol/L Final     Creatinine   Date Value Ref Range Status   01/08/2018 0.63 0.52 - 1.04 mg/dL Final     Urea Nitrogen   Date Value Ref Range Status   01/08/2018 15 7 - 30 mg/dL Final     GFR  "Estimate   Date Value Ref Range Status   01/08/2018 >90 >60 mL/min/1.7m2 Final     Comment:     Non  GFR Calc      A baseline potassium, creatinine, BUN, GFR has been done within past 12 months      OBJECTIVE:  Patient in in no apparent distress and able to provide full history for today's encounter. she denies pain or any current illness   Vitals: /78 (BP Location: Right arm, Patient Position: Sitting, Cuff Size: Adult Regular)  Pulse 50  Temp 98.5  F (36.9  C) (Tympanic)  Wt 166 lb (75.3 kg)  SpO2 100%  BMI 28.27 kg/m2      Pulse Readings from Last 1 Encounters:   11/12/18 50     Is pulse 55 or greater? - No, asymptomatic.    BMI= Body mass index is 28.27 kg/(m^2).  Other exam findings    Waist Circum 37.75 \"    ASSESSMENT/PLAN:   VICKI Flowsheet has been  'filed' ) for this visit (this saves flowsheet data)    Blood pressure reading today is at the provider specified goal of <140/90.      1.  Based on VICKI RN HTN MGMT standing order the patient will be advised continue current medication regimen, will continue to hold chlorthalidone and stop potassium supplement.  In the future may consider a different diuretic, such as hydrochlorothiazide  2.  We will not be checking a metabolic lab panel today.      3.  Follow up instructions include:     Next Provider visit: Follow up in 6 months (annual exam).    See avs for more details.  Full research packet also provide to patient previously    Patient was counseled regarding the lifestyle changes (listed below)  to help with BP management Yes  Lifestyle changes that can help control high blood pressure:  Even though PGEN is a study to test effectiveness of genetically guided medications for managing high blood pressure, there are several things you can do to ensure your blood pressure stays in good control:    Maintain a healthy weight (BMI<26). A modest amount of weight loss can be helpful    Limit salt intake to under 2400mg daily    Follow the " DASH diet (lean meats, low salt, whole grains, lots of fruits/vegies)    Stay active, try to get in 30 minutes of exercise daily.    Manage your daily stress.    Do not smoke cigarettes (or cut back)    Limit alcohol (2 drinks/day for men, 1 drink/day for women)  Was AVS  provided to patient with the relevant <dot>PGENPI dot phrase pulled into patient instructions Yes    Patient was advised to follow up with PCP  to continue ongoing care of HTN since study visits are now complete.          Dennise Terrazas PA-C

## 2018-11-12 NOTE — TELEPHONE ENCOUNTER
"Requested Prescriptions   Pending Prescriptions Disp Refills     amLODIPine (NORVASC) 5 MG tablet 30 tablet 0    Last Written Prescription Date:  10/19/2018 #30 x 0  Last filled - not provided  Last office visit: 07/23/2018 JOLENE Terrazas  Future Office Visit: None    Note: Requesting a 90 day supply     Sig: Take 1 tablet (5 mg) by mouth daily    Calcium Channel Blockers Protocol  Passed    11/12/2018  3:57 PM       Passed - Blood pressure under 140/90 in past 12 months    BP Readings from Last 3 Encounters:   11/12/18 124/78   07/23/18 144/90   05/30/18 132/90                Passed - Recent (12 mo) or future (30 days) visit within the authorizing provider's specialty    Patient had office visit in the last 12 months or has a visit in the next 30 days with authorizing provider or within the authorizing provider's specialty.  See \"Patient Info\" tab in inbasket, or \"Choose Columns\" in Meds & Orders section of the refill encounter.             Passed - Patient is age 18 or older       Passed - No active pregnancy on record       Passed - Normal serum creatinine on file in past 12 months    Recent Labs   Lab Test  01/08/18   1632   CR  0.63            Passed - No positive pregnancy test in past 12 months          "

## 2018-11-12 NOTE — NURSING NOTE
"Initial /78 (BP Location: Right arm, Patient Position: Sitting, Cuff Size: Adult Regular)  Pulse 50  Temp 98.5  F (36.9  C) (Tympanic)  Wt 166 lb (75.3 kg)  SpO2 100%  BMI 28.27 kg/m2 Estimated body mass index is 28.27 kg/(m^2) as calculated from the following:    Height as of 7/23/18: 5' 4.25\" (1.632 m).    Weight as of this encounter: 166 lb (75.3 kg). .    Sary Lewis CMA([facility])    "

## 2019-02-13 ENCOUNTER — MYC MEDICAL ADVICE (OUTPATIENT)
Dept: FAMILY MEDICINE | Facility: CLINIC | Age: 54
End: 2019-02-13

## 2019-05-13 ENCOUNTER — OFFICE VISIT (OUTPATIENT)
Dept: FAMILY MEDICINE | Facility: CLINIC | Age: 54
End: 2019-05-13
Payer: COMMERCIAL

## 2019-05-13 VITALS
HEIGHT: 64 IN | DIASTOLIC BLOOD PRESSURE: 88 MMHG | SYSTOLIC BLOOD PRESSURE: 138 MMHG | RESPIRATION RATE: 20 BRPM | TEMPERATURE: 98.1 F | WEIGHT: 165 LBS | BODY MASS INDEX: 28.17 KG/M2 | HEART RATE: 66 BPM | OXYGEN SATURATION: 99 %

## 2019-05-13 DIAGNOSIS — I10 HYPERTENSION GOAL BP (BLOOD PRESSURE) < 140/90: ICD-10-CM

## 2019-05-13 DIAGNOSIS — Z12.11 SPECIAL SCREENING FOR MALIGNANT NEOPLASMS, COLON: Primary | ICD-10-CM

## 2019-05-13 LAB
ANION GAP SERPL CALCULATED.3IONS-SCNC: 4 MMOL/L (ref 3–14)
BUN SERPL-MCNC: 17 MG/DL (ref 7–30)
CALCIUM SERPL-MCNC: 9.4 MG/DL (ref 8.5–10.1)
CHLORIDE SERPL-SCNC: 105 MMOL/L (ref 94–109)
CO2 SERPL-SCNC: 27 MMOL/L (ref 20–32)
CREAT SERPL-MCNC: 0.6 MG/DL (ref 0.52–1.04)
CREAT UR-MCNC: 28 MG/DL
GFR SERPL CREATININE-BSD FRML MDRD: >90 ML/MIN/{1.73_M2}
GLUCOSE SERPL-MCNC: 73 MG/DL (ref 70–99)
MICROALBUMIN UR-MCNC: 6 MG/L
MICROALBUMIN/CREAT UR: 19.75 MG/G CR (ref 0–25)
POTASSIUM SERPL-SCNC: 3.8 MMOL/L (ref 3.4–5.3)
SODIUM SERPL-SCNC: 136 MMOL/L (ref 133–144)

## 2019-05-13 PROCEDURE — 80048 BASIC METABOLIC PNL TOTAL CA: CPT | Performed by: PHYSICIAN ASSISTANT

## 2019-05-13 PROCEDURE — 99214 OFFICE O/P EST MOD 30 MIN: CPT | Performed by: PHYSICIAN ASSISTANT

## 2019-05-13 PROCEDURE — 36415 COLL VENOUS BLD VENIPUNCTURE: CPT | Performed by: PHYSICIAN ASSISTANT

## 2019-05-13 PROCEDURE — 82043 UR ALBUMIN QUANTITATIVE: CPT | Performed by: PHYSICIAN ASSISTANT

## 2019-05-13 RX ORDER — AMLODIPINE BESYLATE 5 MG/1
5 TABLET ORAL DAILY
Qty: 90 TABLET | Refills: 1 | Status: SHIPPED | OUTPATIENT
Start: 2019-05-13 | End: 2019-11-01

## 2019-05-13 ASSESSMENT — MIFFLIN-ST. JEOR: SCORE: 1352.41

## 2019-05-13 NOTE — NURSING NOTE
"Initial /90   Pulse 66   Temp 98.1  F (36.7  C) (Tympanic)   Resp 20   Ht 1.632 m (5' 4.25\")   Wt 74.8 kg (165 lb)   LMP 12/23/2018 (Within Days)   SpO2 99%   BMI 28.10 kg/m   Estimated body mass index is 28.1 kg/m  as calculated from the following:    Height as of this encounter: 1.632 m (5' 4.25\").    Weight as of this encounter: 74.8 kg (165 lb). .    Sary Lewis, CRISTINA on 5/13/2019 at 3:42 PM    "

## 2019-05-13 NOTE — PROGRESS NOTES
"  SUBJECTIVE:   Jena Kamara is a 53 year old female who presents to clinic today for the following   health issues:      Hypertension Follow-up      Outpatient blood pressures are being checked at home.  Results are \"good\". This /74, 128/72. 125/74 in afternoon recently.     Low Salt Diet: no added salt      Amount of exercise or physical activity: 6-7 days/week for an average of 45-60 minutes    Problems taking medications regularly: No    Medication side effects: none    Diet: DASH diet    She prefers to do the FIT test for colon cancer screening now, may consider the colonoscopy in the Winter.         Additional history: as documented    Reviewed  and updated as needed this visit by clinical staff  Tobacco  Allergies  Meds  Med Hx  Surg Hx  Fam Hx  Soc Hx        Reviewed and updated as needed this visit by Provider         Patient Active Problem List   Diagnosis     Angioedema     Allergic rhinitis     ROSACEA     Stress fracture of tibia or fibula     Hypertension goal BP (blood pressure) < 140/90     Hyperlipidemia LDL goal <130     HCH     Hypokalemia     Past Surgical History:   Procedure Laterality Date     SURGICAL HISTORY OF -       Sinus Surgery     SURGICAL HISTORY OF -              Social History     Tobacco Use     Smoking status: Never Smoker     Smokeless tobacco: Never Used   Substance Use Topics     Alcohol use: Yes     Comment: 1 drink per month     Family History   Problem Relation Age of Onset     Cerebrovascular Disease Mother         age 49     Hypertension Mother      Lipids Father      Respiratory Father         COPD     Eye Disorder Father      Alzheimer Disease Father         age 76     Depression Maternal Grandmother         suicide     Cerebrovascular Disease Maternal Grandfather      Respiratory Paternal Grandfather      Eye Disorder Sister         optical atrophy     Diabetes No family hx of      C.A.D. No family hx of      Breast Cancer No family hx of  " "    Cancer - colorectal No family hx of      Prostate Cancer No family hx of          Current Outpatient Medications   Medication Sig Dispense Refill     amLODIPine (NORVASC) 5 MG tablet Take 1 tablet (5 mg) by mouth daily 90 tablet 1     CLARITIN 10 MG OR TABS 1 TABLET DAILY PRN       fluticasone (FLONASE) 50 MCG/ACT nasal spray Spray 2 sprays into both nostrils daily. Prn   1 Package 11     MULTIPLE VITAMIN OR TABS 1 TABLET DAILY       BP Readings from Last 3 Encounters:   05/13/19 140/90   11/12/18 124/78   07/23/18 144/90    Wt Readings from Last 3 Encounters:   05/13/19 74.8 kg (165 lb)   11/12/18 75.3 kg (166 lb)   07/23/18 71.4 kg (157 lb 6.4 oz)                    ROS:  Constitutional, HEENT, cardiovascular, pulmonary, gi and gu systems are negative, except as otherwise noted.    OBJECTIVE:     /90   Pulse 66   Temp 98.1  F (36.7  C) (Tympanic)   Resp 20   Ht 1.632 m (5' 4.25\")   Wt 74.8 kg (165 lb)   LMP 12/23/2018 (Within Days)   SpO2 99%   BMI 28.10 kg/m    Body mass index is 28.1 kg/m .  GENERAL: healthy, alert and no distress  NECK: no adenopathy, no asymmetry, masses, or scars and thyroid normal to palpation  RESP: lungs clear to auscultation - no rales, rhonchi or wheezes  CV: regular rate and rhythm, normal S1 S2, no S3 or S4, no murmur, click or rub, no peripheral edema and peripheral pulses strong  ABDOMEN: soft, nontender, no hepatosplenomegaly, no masses and bowel sounds normal  MS: no gross musculoskeletal defects noted, no edema    Diagnostic Test Results:  none     ASSESSMENT/PLAN:       1. Hypertension goal BP (blood pressure) < 140/90  BP on high end of normal here, however home readings look great.  Will leave medication as is for now and continue to monitor.  Due to recheck labs.   - amLODIPine (NORVASC) 5 MG tablet; Take 1 tablet (5 mg) by mouth daily  Dispense: 90 tablet; Refill: 1  - Basic metabolic panel  - Albumin Random Urine Quantitative with Creat Ratio    2. Special " screening for malignant neoplasms, colon  Will check FIT now, may consider colonoscopy in the winter.   - Fecal colorectal cancer screen FIT; Future    FUTURE APPOINTMENTS:       - Follow-up visit in 6 months (physical)    Dennise Terrazas PA-C  [facility]

## 2019-05-18 PROCEDURE — 82274 ASSAY TEST FOR BLOOD FECAL: CPT | Performed by: PHYSICIAN ASSISTANT

## 2019-05-26 LAB — HEMOCCULT STL QL IA: NEGATIVE

## 2019-05-28 DIAGNOSIS — Z12.11 SPECIAL SCREENING FOR MALIGNANT NEOPLASMS, COLON: ICD-10-CM

## 2019-11-01 DIAGNOSIS — I10 HYPERTENSION GOAL BP (BLOOD PRESSURE) < 140/90: ICD-10-CM

## 2019-11-01 RX ORDER — AMLODIPINE BESYLATE 5 MG/1
TABLET ORAL
Qty: 90 TABLET | Refills: 0 | Status: SHIPPED | OUTPATIENT
Start: 2019-11-01 | End: 2020-01-30

## 2019-11-01 NOTE — TELEPHONE ENCOUNTER
Prescription approved per [facility] [place] [facility].  Yann Bolaños RN

## 2019-11-03 ENCOUNTER — HEALTH MAINTENANCE LETTER (OUTPATIENT)
Age: 54
End: 2019-11-03

## 2020-01-28 DIAGNOSIS — I10 HYPERTENSION GOAL BP (BLOOD PRESSURE) < 140/90: ICD-10-CM

## 2020-01-29 NOTE — TELEPHONE ENCOUNTER
"Requested Prescriptions   Pending Prescriptions Disp Refills     amLODIPine (NORVASC) 5 MG tablet [Pharmacy Med Name: AMLODIPINE BESYLATE 5 MG TAB]  Last Written Prescription Date:  11/1/19  Last Fill Quantity: 90,  # refills: 0   Last office visit: 5/13/2019 with prescribing provider:  mercedes   Future Office Visit:     90 tablet 0     Sig: TAKE 1 TABLET BY MOUTH EVERY DAY       Calcium Channel Blockers Protocol  Passed - 1/28/2020  1:47 AM        Passed - Blood pressure under 140/90 in past 12 months     BP Readings from Last 3 Encounters:   05/13/19 138/88   11/12/18 124/78   07/23/18 144/90                 Passed - Recent (12 mo) or future (30 days) visit within the authorizing provider's specialty     Patient has had an office visit with the authorizing provider or a provider within the authorizing providers department within the previous 12 mos or has a future within next 30 days. See \"Patient Info\" tab in inbasket, or \"Choose Columns\" in Meds & Orders section of the refill encounter.              Passed - Medication is active on med list        Passed - Patient is age 18 or older        Passed - No active pregnancy on record        Passed - Normal serum creatinine on file in past 12 months     Recent Labs   Lab Test 05/13/19  1608   CR 0.60             Passed - No positive pregnancy test in past 12 months          "

## 2020-01-30 RX ORDER — AMLODIPINE BESYLATE 5 MG/1
5 TABLET ORAL DAILY
Qty: 30 TABLET | Refills: 0 | Status: SHIPPED | OUTPATIENT
Start: 2020-01-30 | End: 2020-03-09

## 2020-03-01 DIAGNOSIS — I10 HYPERTENSION GOAL BP (BLOOD PRESSURE) < 140/90: ICD-10-CM

## 2020-03-02 NOTE — TELEPHONE ENCOUNTER
"Requested Prescriptions   Pending Prescriptions Disp Refills     amLODIPine (NORVASC) 5 MG tablet [Pharmacy Med Name: AMLODIPINE BESYLATE 5 MG TAB] 30 tablet 0     Sig: TAKE 1 TABLET (5 MG) BY MOUTH DAILY (NEEDS FOLLOW-UP APPOINTMENT FOR THIS MEDICATION)  Last Written Prescription Date:  01/30/2020 #30 x 0  Last filled 01/30/2020  Last office visit: 5/13/2019 JOLENE Terrazas   Future Office Visit:  None         Calcium Channel Blockers Protocol  Passed - 3/1/2020  8:34 AM        Passed - Blood pressure under 140/90 in past 12 months     BP Readings from Last 3 Encounters:   05/13/19 138/88   11/12/18 124/78   07/23/18 144/90                 Passed - Recent (12 mo) or future (30 days) visit within the authorizing provider's specialty     Patient has had an office visit with the authorizing provider or a provider within the authorizing providers department within the previous 12 mos or has a future within next 30 days. See \"Patient Info\" tab in inbasket, or \"Choose Columns\" in Meds & Orders section of the refill encounter.              Passed - Medication is active on med list        Passed - Patient is age 18 or older        Passed - No active pregnancy on record        Passed - Normal serum creatinine on file in past 12 months     Recent Labs   Lab Test 05/13/19  1608   CR 0.60             Passed - No positive pregnancy test in past 12 months          "

## 2020-03-02 NOTE — TELEPHONE ENCOUNTER
Routing refill request to provider for review/approval because:  Eve given x1 and patient did not follow up, please advise  Patient needs to be seen because:  Last OV 5/13/19: 'FUTURE APPOINTMENTS:       - Follow-up visit in 6 months (physical)'    Odalys ARIAS RN

## 2020-03-03 RX ORDER — AMLODIPINE BESYLATE 5 MG/1
5 TABLET ORAL DAILY
Refills: 0
Start: 2020-03-03

## 2020-03-03 NOTE — TELEPHONE ENCOUNTER
Please call monico Bella for her annual physical for further refills.  BP was a bit high last visit, so I suggest we recheck.     Please route back to me if she needs a refill before she can get in.   Dennise Terrazas PA-C

## 2020-03-05 NOTE — PROGRESS NOTES
SUBJECTIVE:   CC: Jena Kamara is an 54 year old woman who presents for preventive health visit.     Healthy Habits:    Do you get at least three servings of calcium containing foods daily (dairy, green leafy vegetables, etc.)? yes    Amount of exercise or daily activities, outside of work: 6-7 day(s) per week    Problems taking medications regularly No    Medication side effects: No    Have you had an eye exam in the past two years? yes    Do you see a dentist twice per year? yes    Do you have sleep apnea, excessive snoring or daytime drowsiness?yes    Hypertension Follow-up      Do you check your blood pressure regularly outside of the clinic? Yes     Are you following a low salt diet? Yes    Are your blood pressures ever more than 140 on the top number (systolic) OR more   than 90 on the bottom number (diastolic), for example 140/90? No    *Bilateral ears feel 'water-logged'    *Frequent hot flashes several times per day. Disrupting her sleep. LMP around memorial day.     Today's PHQ-2 Score:   PHQ-2 ( 1999 [employer]) 3/6/2020 10/25/2017   Q1: Little interest or pleasure in doing things 0 0   Q2: Feeling down, depressed or hopeless 0 0   PHQ-2 Score 0 0     Abuse: Current or Past(Physical, Sexual or Emotional)- No  Do you feel safe in your environment? Yes    Social History     Tobacco Use     Smoking status: Never Smoker     Smokeless tobacco: Never Used   Substance Use Topics     Alcohol use: Yes     Comment: 1 drink per month     If you drink alcohol do you typically have >3 drinks per day or >7 drinks per week? No                     Reviewed orders with patient.  Reviewed health maintenance and updated orders accordingly - Yes  BP Readings from Last 3 Encounters:   03/06/20 128/86   05/13/19 138/88   11/12/18 124/78    Wt Readings from Last 3 Encounters:   03/06/20 77.6 kg (171 lb)   05/13/19 74.8 kg (165 lb)   11/12/18 75.3 kg (166 lb)         Mammogram Screening: Patient over age 50, mutual decision to  "screen reflected in health maintenance.    Pertinent mammograms are reviewed under the imaging tab.  History of abnormal Pap smear:   Last 3 Pap and HPV Results:   PAP / HPV Latest Ref Rng & Units 5/30/2018 11/6/2012 11/1/2007   PAP - NIL NIL NIL   HPV 16 DNA NEG:Negative Negative - -   HPV 18 DNA NEG:Negative Negative - -   OTHER HR HPV NEG:Negative Negative - -     PAP / HPV Latest Ref Rng & Units 5/30/2018 11/6/2012 11/1/2007   PAP - NIL NIL NIL   HPV 16 DNA NEG:Negative Negative - -   HPV 18 DNA NEG:Negative Negative - -   OTHER HR HPV NEG:Negative Negative - -     Reviewed and updated as needed this visit by clinical staff  Tobacco  Allergies  Meds  Problems  Med Hx  Surg Hx  Fam Hx  Soc Hx       [facility]    Reviewed and updated as needed this visit by Provider  Tobacco  Allergies  Meds  Problems  Med Hx  Surg Hx  Fam Hx            ROS:  CONSTITUTIONAL: NEGATIVE for fever, chills, change in weight  INTEGUMENTARY/SKIN: NEGATIVE for worrisome rashes, moles or lesions  EYES: NEGATIVE for vision changes or irritation  ENT: NEGATIVE for ear, mouth and throat problems  RESP: NEGATIVE for significant cough or SOB  BREAST: NEGATIVE for masses, tenderness or discharge  CV: NEGATIVE for chest pain, palpitations or peripheral edema  GI: NEGATIVE for nausea, abdominal pain, heartburn, or change in bowel habits  : NEGATIVE for unusual urinary or vaginal symptoms. No vaginal bleeding.  MUSCULOSKELETAL: NEGATIVE for significant arthralgias or myalgia  NEURO: NEGATIVE for weakness, dizziness or paresthesias  PSYCHIATRIC: NEGATIVE for changes in mood or affect     OBJECTIVE:   /86   Pulse 61   Temp 98.1  F (36.7  C) (Tympanic)   Resp 15   Ht 1.632 m (5' 4.25\")   Wt 77.6 kg (171 lb)   LMP  (LMP Unknown)   SpO2 98%   Breastfeeding No   BMI 29.12 kg/m    EXAM:  GENERAL APPEARANCE: healthy, alert and no distress  EYES: Eyes grossly normal to inspection, PERRL and conjunctivae and sclerae " normal  HENT: ear canals and TM's normal, nose and mouth without ulcers or lesions, oropharynx clear and oral mucous membranes moist  NECK: no adenopathy, no asymmetry, masses, or scars and thyroid normal to palpation  RESP: lungs clear to auscultation - no rales, rhonchi or wheezes  BREAST: normal without masses, tenderness or nipple discharge and no palpable axillary masses or adenopathy  CV: regular rate and rhythm, normal S1 S2, no S3 or S4, no murmur, click or rub, no peripheral edema and peripheral pulses strong  ABDOMEN: soft, nontender, no hepatosplenomegaly, no masses and bowel sounds normal  MS: no musculoskeletal defects are noted and gait is age appropriate without ataxia  SKIN: no suspicious lesions or rashes  NEURO: Normal strength and tone, sensory exam grossly normal, mentation intact and speech normal  PSYCH: mentation appears normal and affect normal/bright    Diagnostic Test Results:  Labs reviewed in Epic  none     ASSESSMENT/PLAN:   1. Routine general medical examination at a health care facility       HEALTH CARE MAINTENANCE              Reviewed USPTF recommendations and anticipatory guidance.              See orders.     2. Menopausal syndrome (hot flashes)  Menopausal symptoms.        We discussed the symptoms and pathophysiology of menopause.  I discussed natural ways to alleviate these symptoms, including a regular exercise program, avoiding hot, spicy foods, alcohol and caffeine.  We discussed commonly used medications for menopause, including hormone replacement, clonidine and SSRI's and the associated risks/benefits.  We discussed the importance of screening for breast cancer and osteoporosis.     We also discussed alternative treatment options including serotonin specific reuptake inhibitor's and clonidine.  Given BP on high end of normal today, will first try adding clonidine        - cloNIDine (CATAPRES) 0.1 MG tablet; Take 1 tablet (0.1 mg) by mouth 2 times daily  Dispense: 60  "tablet; Refill: 1    3. Hypertension goal BP (blood pressure) < 140/90  Will add clonidine, may be able to cut back on amlodipine even further.   - Basic metabolic panel  - Albumin Random Urine Quantitative with Creat Ratio    4. Special screening for malignant neoplasms, colon  I discussed the importance of colorectal cancer screening, including the risks and benefits of the various procedures, including colonoscopy and annual FOB immunoassay test.  Patient education materials given during examination today on colon cancer screening.   Patient has opted to do FIT testing     - Fecal colorectal cancer screen (FIT); Future    5. Encounter for screening mammogram for breast cancer  I discussed in detail with Jena Kamara the importance of breast cancer screening through monthly self breast exams and annual breast exams in the clinic.   - *MA Screening Digital Bilateral; Future    COUNSELING:   Reviewed preventive health counseling, as reflected in patient instructions       Regular exercise       Healthy diet/nutrition       (Mariana)menopause management    Estimated body mass index is 29.12 kg/m  as calculated from the following:    Height as of this encounter: 1.632 m (5' 4.25\").    Weight as of this encounter: 77.6 kg (171 lb).         reports that she has never smoked. She has never used smokeless tobacco.      Counseling Resources:  ATP IV Guidelines  Pooled Cohorts Equation Calculator  Breast Cancer Risk Calculator  FRAX Risk Assessment  ICSI Preventive Guidelines  Dietary Guidelines for Americans, 2010  USDA's MyPlate  ASA Prophylaxis  Lung CA Screening    Dennise Terrazas PA-C  [facility]  "

## 2020-03-06 ENCOUNTER — OFFICE VISIT (OUTPATIENT)
Dept: FAMILY MEDICINE | Facility: CLINIC | Age: 55
End: 2020-03-06
Payer: COMMERCIAL

## 2020-03-06 VITALS
BODY MASS INDEX: 29.19 KG/M2 | HEIGHT: 64 IN | OXYGEN SATURATION: 98 % | RESPIRATION RATE: 15 BRPM | DIASTOLIC BLOOD PRESSURE: 86 MMHG | WEIGHT: 171 LBS | TEMPERATURE: 98.1 F | SYSTOLIC BLOOD PRESSURE: 128 MMHG | HEART RATE: 61 BPM

## 2020-03-06 DIAGNOSIS — Z12.11 SPECIAL SCREENING FOR MALIGNANT NEOPLASMS, COLON: ICD-10-CM

## 2020-03-06 DIAGNOSIS — N95.1 MENOPAUSAL SYNDROME (HOT FLASHES): ICD-10-CM

## 2020-03-06 DIAGNOSIS — I10 HYPERTENSION GOAL BP (BLOOD PRESSURE) < 140/90: ICD-10-CM

## 2020-03-06 DIAGNOSIS — Z12.31 ENCOUNTER FOR SCREENING MAMMOGRAM FOR BREAST CANCER: ICD-10-CM

## 2020-03-06 DIAGNOSIS — Z00.00 ROUTINE GENERAL MEDICAL EXAMINATION AT A HEALTH CARE FACILITY: Primary | ICD-10-CM

## 2020-03-06 LAB
ANION GAP SERPL CALCULATED.3IONS-SCNC: 3 MMOL/L (ref 3–14)
BUN SERPL-MCNC: 18 MG/DL (ref 7–30)
CALCIUM SERPL-MCNC: 9.2 MG/DL (ref 8.5–10.1)
CHLORIDE SERPL-SCNC: 105 MMOL/L (ref 94–109)
CO2 SERPL-SCNC: 31 MMOL/L (ref 20–32)
CREAT SERPL-MCNC: 0.82 MG/DL (ref 0.52–1.04)
CREAT UR-MCNC: 97 MG/DL
GFR SERPL CREATININE-BSD FRML MDRD: 82 ML/MIN/{1.73_M2}
GLUCOSE SERPL-MCNC: 83 MG/DL (ref 70–99)
MICROALBUMIN UR-MCNC: 16 MG/L
MICROALBUMIN/CREAT UR: 16.37 MG/G CR (ref 0–25)
POTASSIUM SERPL-SCNC: 4.6 MMOL/L (ref 3.4–5.3)
SODIUM SERPL-SCNC: 139 MMOL/L (ref 133–144)

## 2020-03-06 PROCEDURE — 99214 OFFICE O/P EST MOD 30 MIN: CPT | Mod: 25 | Performed by: PHYSICIAN ASSISTANT

## 2020-03-06 PROCEDURE — 99396 PREV VISIT EST AGE 40-64: CPT | Performed by: PHYSICIAN ASSISTANT

## 2020-03-06 PROCEDURE — 82043 UR ALBUMIN QUANTITATIVE: CPT | Performed by: PHYSICIAN ASSISTANT

## 2020-03-06 PROCEDURE — 36415 COLL VENOUS BLD VENIPUNCTURE: CPT | Performed by: PHYSICIAN ASSISTANT

## 2020-03-06 PROCEDURE — 80048 BASIC METABOLIC PNL TOTAL CA: CPT | Performed by: PHYSICIAN ASSISTANT

## 2020-03-06 RX ORDER — SODIUM FLUORIDE 1.1 G/100G
CREAM ORAL
COMMUNITY
Start: 2020-01-29 | End: 2022-04-12

## 2020-03-06 RX ORDER — CLONIDINE HYDROCHLORIDE 0.1 MG/1
0.1 TABLET ORAL 2 TIMES DAILY
Qty: 60 TABLET | Refills: 1 | Status: SHIPPED | OUTPATIENT
Start: 2020-03-06 | End: 2020-11-18

## 2020-03-06 ASSESSMENT — MIFFLIN-ST. JEOR: SCORE: 1374.62

## 2020-03-07 PROCEDURE — 82274 ASSAY TEST FOR BLOOD FECAL: CPT | Performed by: PHYSICIAN ASSISTANT

## 2020-03-09 RX ORDER — AMLODIPINE BESYLATE 5 MG/1
5 TABLET ORAL DAILY
Qty: 90 TABLET | Refills: 1 | Status: SHIPPED | OUTPATIENT
Start: 2020-03-09 | End: 2020-09-15

## 2020-03-17 ENCOUNTER — MYC MEDICAL ADVICE (OUTPATIENT)
Dept: FAMILY MEDICINE | Facility: CLINIC | Age: 55
End: 2020-03-17

## 2020-03-18 NOTE — TELEPHONE ENCOUNTER
Patient would like to stop taking clonidine due to the side effects. Are there any taper down instructions?  Routed to provider.  Macrina Anderson RN

## 2020-03-19 LAB — HEMOCCULT STL QL IA: NEGATIVE

## 2020-03-19 NOTE — RESULT ENCOUNTER NOTE
Dear Jena,    Your fecal occult blood test (FOBT) was negative.      A fecal occult blood test may detect small amounts of blood in the stool, which could suggest colon cancer.   This test should be repeated in 1 year.    Please follow-up if you have any questions or concerns.    Sincerely,    Dennise Terrazas PA-C

## 2020-03-29 DIAGNOSIS — N95.1 MENOPAUSAL SYNDROME (HOT FLASHES): ICD-10-CM

## 2020-03-30 RX ORDER — CLONIDINE HYDROCHLORIDE 0.1 MG/1
0.1 TABLET ORAL 2 TIMES DAILY
Qty: 60 TABLET | Refills: 1 | OUTPATIENT
Start: 2020-03-30

## 2020-03-30 NOTE — TELEPHONE ENCOUNTER
"Requested Prescriptions   Pending Prescriptions Disp Refills     cloNIDine (CATAPRES) 0.1 MG tablet [Pharmacy Med Name: CLONIDINE HCL 0.1 MG TABLET] 60 tablet 1     Sig: TAKE 1 TABLET (0.1 MG) BY MOUTH 2 TIMES DAILY  Last Written Prescription Date:  03/06/2020 #60 x 1  Last filled 03/06/2020  Last office visit: 3/6/2020 JOLENE Terrazas   Future Office Visit:  None         Central Acting Antiadrenergic Agents Passed - 3/29/2020 12:33 PM        Passed - Blood pressure under 140/90 in past 12 months     BP Readings from Last 3 Encounters:   03/06/20 128/86   05/13/19 138/88   11/12/18 124/78                 Passed - Patient is 6 years of age or older        Passed - Recent (12 mo) or future (30 days) visit within the authorizing provider's specialty     Patient has had an office visit with the authorizing provider or a provider within the authorizing providers department within the previous 12 mos or has a future within next 30 days. See \"Patient Info\" tab in inbasket, or \"Choose Columns\" in Meds & Orders section of the refill encounter.              Passed - Medication is active on med list        Passed - Patient not pregnant        Passed - Normal serum creatinine on file within past 12 months     Recent Labs   Lab Test 03/06/20  1705   CR 0.82       Ok to refill medication if creatinine is low          Passed - No positive pregnancy test on file in past 12 months       Antiadrenergic Antihypertensives Passed - 3/29/2020 12:33 PM        Passed - Blood pressure less than 140/90 in past 6 months     BP Readings from Last 3 Encounters:   03/06/20 128/86   05/13/19 138/88   11/12/18 124/78                 Passed - Medication is active on med list        Passed - Patient is age 18 or older        Passed - No active pregnancy on record        Passed - Normal serum creatinine on file in past 12 months     Recent Labs   Lab Test 03/06/20  1705   CR 0.82       Ok to refill medication if creatinine is low          Passed - No " "positive pregnancy test within past 12 months        Passed - Recent (6 mo) or future (30 days) visit within the authorizing provider's specialty     Patient had office visit in the last 6 months or has a visit in the next 30 days with authorizing provider or within the authorizing provider's specialty.  See \"Patient Info\" tab in inbasket, or \"Choose Columns\" in Meds & Orders section of the refill encounter.                 "

## 2020-09-15 DIAGNOSIS — I10 HYPERTENSION GOAL BP (BLOOD PRESSURE) < 140/90: ICD-10-CM

## 2020-09-15 RX ORDER — AMLODIPINE BESYLATE 5 MG/1
5 TABLET ORAL DAILY
Qty: 30 TABLET | Refills: 0 | Status: SHIPPED | OUTPATIENT
Start: 2020-09-15 | End: 2020-10-17

## 2020-09-15 NOTE — TELEPHONE ENCOUNTER
Eve refill sent, [employer] message sent to notify patient she is due for a recheck.   Dennise Terrazas PA-C

## 2020-10-14 ENCOUNTER — TELEPHONE (OUTPATIENT)
Dept: FAMILY MEDICINE | Facility: CLINIC | Age: 55
End: 2020-10-14

## 2020-10-14 DIAGNOSIS — I10 HYPERTENSION GOAL BP (BLOOD PRESSURE) < 140/90: ICD-10-CM

## 2020-10-16 NOTE — TELEPHONE ENCOUNTER
"  Routing refill request to provider for review/approval because:  Message was sent on 9.15.20 to patient as she is due for office visit. Eve refill was given then.  Macrina Anderson RN    Last office visit: 3/6/2020 with prescribing provider:  Mk   Future Office Visit:       Calcium Channel Blockers Protocol  Passed - 10/14/2020  8:39 AM        Passed - Blood pressure under 140/90 in past 12 months     BP Readings from Last 3 Encounters:   03/06/20 128/86   05/13/19 138/88   11/12/18 124/78                 Passed - Recent (12 mo) or future (30 days) visit within the authorizing provider's specialty     Patient has had an office visit with the authorizing provider or a provider within the authorizing providers department within the previous 12 mos or has a future within next 30 days. See \"Patient Info\" tab in inbasket, or \"Choose Columns\" in Meds & Orders section of the refill encounter.              Passed - Medication is active on med list        Passed - Patient is age 18 or older        Passed - No active pregnancy on record        Passed - Normal serum creatinine on file in past 12 months     Recent Labs   Lab Test 03/06/20  1705   CR 0.82       Ok to refill medication if creatinine is low          Passed - No positive pregnancy test in past 12 months             "

## 2020-10-17 RX ORDER — AMLODIPINE BESYLATE 5 MG/1
5 TABLET ORAL DAILY
Qty: 30 TABLET | Refills: 0 | Status: SHIPPED | OUTPATIENT
Start: 2020-10-17 | End: 2020-11-18

## 2020-10-17 NOTE — TELEPHONE ENCOUNTER
Please call patient, due for recheck for further refills, please assist patient in scheduling office appt.  Eve refill given.       Dennise Terrazas PA-C

## 2020-10-19 NOTE — TELEPHONE ENCOUNTER
Left msg for pt that med refilled and follow up needed before running out of med's .    Maia Roberson, [place]

## 2020-11-16 ENCOUNTER — HEALTH MAINTENANCE LETTER (OUTPATIENT)
Age: 55
End: 2020-11-16

## 2020-11-18 ENCOUNTER — VIRTUAL VISIT (OUTPATIENT)
Dept: FAMILY MEDICINE | Facility: CLINIC | Age: 55
End: 2020-11-18
Payer: COMMERCIAL

## 2020-11-18 DIAGNOSIS — I10 HYPERTENSION GOAL BP (BLOOD PRESSURE) < 140/90: ICD-10-CM

## 2020-11-18 PROCEDURE — 99213 OFFICE O/P EST LOW 20 MIN: CPT | Mod: TEL | Performed by: PHYSICIAN ASSISTANT

## 2020-11-18 RX ORDER — AMLODIPINE BESYLATE 5 MG/1
5 TABLET ORAL DAILY
Qty: 90 TABLET | Refills: 3 | Status: SHIPPED | OUTPATIENT
Start: 2020-11-18 | End: 2021-12-08

## 2020-11-18 NOTE — PROGRESS NOTES
"Jena Kamara is a 55 year old female who is being evaluated via a billable telephone visit.      The patient has been notified of following:     \"This telephone visit will be conducted via a call between you and your physician/provider. We have found that certain health care needs can be provided without the need for a physical exam.  This service lets us provide the care you need with a short phone conversation.  If a prescription is necessary we can send it directly to your pharmacy.  If lab work is needed we can place an order for that and you can then stop by our lab to have the test done at a later time.    Telephone visits are billed at different rates depending on your insurance coverage. During this emergency period, for some insurers they may be billed the same as an in-person visit.  Please reach out to your insurance provider with any questions.    If during the course of the call the physician/provider feels a telephone visit is not appropriate, you will not be charged for this service.\"    Patient has given verbal consent for Telephone visit?  Yes    What phone number would you like to be contacted at? [phone]    How would you like to obtain your AVS? Alexa Archer     Jena Kamara is a 55 year old female who presents via phone visit today for the following health issues:    HPI     Hypertension Follow-up      Do you check your blood pressure regularly outside of the clinic? Yes     Are you following a low salt diet? Yes    Are your blood pressures ever more than 140 on the top number (systolic) OR more   than 90 on the bottom number (diastolic), for example 140/90? No        How many servings of fruits and vegetables do you eat daily?  2-3    On average, how many sweetened beverages do you drink each day (Examples: soda, juice, sweet tea, etc.  Do NOT count diet or artificially sweetened beverages)?   0    How many days per week do you exercise enough to make your heart beat faster? " "6    How many minutes a day do you exercise enough to make your heart beat faster? 30 - 60    How many days per week do you miss taking your medication? 0      Review of Systems   Constitutional, HEENT, cardiovascular, pulmonary, gi and gu systems are negative, except as otherwise noted.       Objective   Vitals - Patient Reported  Systolic (Patient Reported): 119  Diastolic (Patient Reported): 74  119/74 mmHg BP this morning.     Vitals:  No vitals were obtained today due to virtual visit.    healthy, alert and no distress  PSYCH: Alert and oriented times 3; coherent speech, normal   rate and volume, able to articulate logical thoughts, able   to abstract reason, no tangential thoughts, no hallucinations   or delusions  Her affect is normal  RESP: No cough, no audible wheezing, able to talk in full sentences  Remainder of exam unable to be completed due to telephone visits    No results found for any visits on 11/18/20.          Assessment & Plan     Hypertension goal BP (blood pressure) < 140/90  She has been tolerating her medication well and home blood pressures look great.  There are no routine labs needed for the norvasc, we will want a microalb and BMP at some point, however there is no rush.  She prefers to limit exposure to Covid-19 by coming into the clinic which is reasonable.    - amLODIPine (NORVASC) 5 MG tablet; Take 1 tablet (5 mg) by mouth daily     BMI:   Estimated body mass index is 29.12 kg/m  as calculated from the following:    Height as of 3/6/20: 1.632 m (5' 4.25\").    Weight as of 3/6/20: 77.6 kg (171 lb).   Weight management plan: Discussed healthy diet and exercise guidelines             Return in about 6 months (around 5/18/2021) for Physical Exam, BP Recheck.    Dennise Terrazas PA-C  [facility]    Phone call duration:  5 minutes                "

## 2020-11-20 RX ORDER — AMLODIPINE BESYLATE 5 MG/1
5 TABLET ORAL DAILY
Qty: 30 TABLET | Refills: 0 | OUTPATIENT
Start: 2020-11-20

## 2020-11-21 ENCOUNTER — E-VISIT (OUTPATIENT)
Dept: URGENT CARE | Facility: URGENT CARE | Age: 55
End: 2020-11-21
Payer: COMMERCIAL

## 2020-11-21 DIAGNOSIS — Z20.822 CLOSE EXPOSURE TO 2019 NOVEL CORONAVIRUS: Primary | ICD-10-CM

## 2020-11-21 PROCEDURE — 99421 OL DIG E/M SVC 5-10 MIN: CPT | Mod: 95 | Performed by: INTERNAL MEDICINE

## 2020-11-21 NOTE — PATIENT INSTRUCTIONS
Dear Jena Kamara,    Based on your exposure to COVID-19 (coronavirus), we would like to test you for this virus. I have placed an order for this test.    To schedule testing, go to your [employer] home page and scroll down to the section that says  You have an appointment that needs to be scheduled  and click the large green button that says  Schedule Now  and follow the steps to find the next available opening.     If you are unable to complete these [employer] scheduling steps, please call [phone] to schedule your testing.     If you know you have had close contact with someone who tested positive, you should be quarantined for 14 days after this exposure. You should stay in quarantine for the14 days even if the covid test is negative.     Quarantine means:  Stay home and away from others. Don't go to school or anywhere else. Generally quarantine means staying home from work but there are some exceptions to this. Please contact your workplace.  No hugging, kissing or shaking hands.  Don't let anyone visit.  Cover your mouth and nose with a mask, tissue or washcloth to avoid spreading germs.  Wash your hands and face often. Use soap and water.    What are the symptoms of COVID-19?  The most common symptoms are cough, fever and trouble breathing. Less common symptoms include headache, body aches, fatigue (feeling very tired), chills, sore throat, stuffy or runny nose, diarrhea (loose poop), loss of taste or smell, belly pain, and nausea or vomiting (feeling sick to your stomach or throwing up).  After 14 days, if you have still don't have symptoms, you likely don't have this virus.  If you develop symptoms, follow these guidelines.  If you're normally healthy: Please start another eVisit.  If you have a serious health problem (like cancer, heart failure, an organ transplant or kidney disease): Call your specialty clinic. Let them know that you might have COVID-19.    When it's time for your COVID test:  Stay at  least 6 feet away from others. (If someone will drive you to your test, stay in the backseat, as far away from the  as you can.)  Cover your mouth and nose with a mask, tissue or washcloth.  Go straight to the testing site. Don't make any stops on the way there or back.    Please note  Patients in these groups are at risk for severe illness due to COVID-19:    People 65 years and older    People who live in a nursing home or long-term care facility    People with chronic disease (lung, heart, cancer, diabetes, kidney, liver, immunologic)    People who have a weakened immune system, including those who:  o Are in cancer treatment  o Take medicine that weakens the immune system, such as corticosteroids  o Had a bone marrow or organ transplant  o Have an immune deficiency  o Have poorly controlled HIV or AIDS  o Are obese (body mass index of 40 or higher)  o Smoke regularly    Where can I get more information?  [facility] - About COVID-19: [email or web address]  CDC - What to Do If You're Sick: www.cdc.gov/coronavirus/2019-ncov/about/steps-when-sick.html  CDC - Ending Home Isolation: www.cdc.gov/coronavirus/2019-ncov/hcp/disposition-in-home-patients.html  CDC - Caring for Someone: www.cdc.gov/coronavirus/2019-ncov/if-you-are-sick/care-for-someone.html  [facility] - Interim Guidance for Hospital Discharge to Home: www.health.[facility].[place].us/diseases/coronavirus/hcp/hospdischarge.pdf  [facility] clinical trials (COVID-19 research studies): clinicalaffairs.[facility]-clinical-trials  Below are the COVID-19 hotlines at the [place] Department of Health ([facility]). Interpreters are available.  For health questions: Call 494-376-2634 or 1-887.286.2540 (7 a.m. to 7 p.m.)  For questions about schools and childcare: Call 634-288-1790 or 1-513.376.6152 (7 a.m. to 7 p.m.)

## 2020-11-24 DIAGNOSIS — Z20.822 CLOSE EXPOSURE TO 2019 NOVEL CORONAVIRUS: ICD-10-CM

## 2020-11-24 PROCEDURE — U0003 INFECTIOUS AGENT DETECTION BY NUCLEIC ACID (DNA OR RNA); SEVERE ACUTE RESPIRATORY SYNDROME CORONAVIRUS 2 (SARS-COV-2) (CORONAVIRUS DISEASE [COVID-19]), AMPLIFIED PROBE TECHNIQUE, MAKING USE OF HIGH THROUGHPUT TECHNOLOGIES AS DESCRIBED BY CMS-2020-01-R: HCPCS | Performed by: INTERNAL MEDICINE

## 2020-11-25 LAB
SARS-COV-2 RNA SPEC QL NAA+PROBE: NOT DETECTED
SPECIMEN SOURCE: NORMAL

## 2021-02-16 NOTE — TELEPHONE ENCOUNTER
Panel Management Review      Patient has the following on her problem list:     Hypertension   Last three blood pressure readings:  BP Readings from Last 3 Encounters:   05/30/18 132/90   05/02/18 120/85   05/02/18 120/85     Blood pressure: FAILED    HTN Guidelines:  Age 18-59 BP range:  Less than 140/90  Age 60-85 with Diabetes:  Less than 140/90  Age 60-85 without Diabetes:  less than 150/90      Composite cancer screening  Chart review shows that this patient is due/due soon for the following Mammogram and Colonoscopy  Summary:    Patient is due/failing the following:   COLONOSCOPY and MAMMOGRAM    Action needed:   Patient needs referral/order: mammogram and colonoscopy    Type of outreach:    Phone, left message for patient to call back.  and Sent [employer] message.    Questions for provider review:    None                                                                                                                                    Gail De Leon CMA on 7/23/2018 at 11:56 AM       Chart routed to none .           Bed: 24  Expected date: 2/16/21  Expected time: 6:44 PM  Means of arrival:   Comments:  [address] [place] [address] X [address] [facility], RN  02/16/21 1769

## 2021-04-29 ENCOUNTER — HOSPITAL ENCOUNTER (OUTPATIENT)
Dept: MAMMOGRAPHY | Facility: CLINIC | Age: 56
Discharge: HOME | End: 2021-04-29
Attending: PHYSICIAN ASSISTANT | Admitting: PHYSICIAN ASSISTANT
Payer: COMMERCIAL

## 2021-04-29 DIAGNOSIS — Z12.31 VISIT FOR SCREENING MAMMOGRAM: ICD-10-CM

## 2021-04-29 PROCEDURE — 77063 BREAST TOMOSYNTHESIS BI: CPT

## 2021-05-29 ENCOUNTER — HEALTH MAINTENANCE LETTER (OUTPATIENT)
Age: 56
End: 2021-05-29

## 2021-07-08 ENCOUNTER — TELEPHONE (OUTPATIENT)
Dept: FAMILY MEDICINE | Facility: CLINIC | Age: 56
End: 2021-07-08

## 2021-07-08 NOTE — TELEPHONE ENCOUNTER
Patient Quality Outreach      Summary:    Patient has the following on her problem list/HM: None    Patient is due/failing the following:   Colonoscopy    Type of outreach:    Sent [employer] message.    Questions for provider review:    None                                                                                                                                     Indigo Mazariegos MA       Chart routed to none.

## 2021-09-18 ENCOUNTER — HEALTH MAINTENANCE LETTER (OUTPATIENT)
Age: 56
End: 2021-09-18

## 2021-12-07 DIAGNOSIS — I10 HYPERTENSION GOAL BP (BLOOD PRESSURE) < 140/90: ICD-10-CM

## 2021-12-08 RX ORDER — AMLODIPINE BESYLATE 5 MG/1
5 TABLET ORAL DAILY
Qty: 90 TABLET | Refills: 0 | Status: SHIPPED | OUTPATIENT
Start: 2021-12-08 | End: 2022-03-30

## 2021-12-08 NOTE — TELEPHONE ENCOUNTER
Medication is being filled for 1 time refill only due to:  Patient needs to be seen because +++NEED APPT+++.

## 2022-03-30 DIAGNOSIS — I10 HYPERTENSION GOAL BP (BLOOD PRESSURE) < 140/90: ICD-10-CM

## 2022-03-30 RX ORDER — AMLODIPINE BESYLATE 5 MG/1
5 TABLET ORAL DAILY
Qty: 30 TABLET | Refills: 0 | Status: SHIPPED | OUTPATIENT
Start: 2022-03-30 | End: 2022-04-26

## 2022-03-30 NOTE — TELEPHONE ENCOUNTER
Medication is being filled for 1 time refill only due to:  Patient needs to be seen because it has been more than one year since last visit. Has appointment on 4/12/22.  Yann Bolaños RN

## 2022-04-12 ENCOUNTER — OFFICE VISIT (OUTPATIENT)
Dept: FAMILY MEDICINE | Facility: CLINIC | Age: 57
End: 2022-04-12
Payer: COMMERCIAL

## 2022-04-12 VITALS
DIASTOLIC BLOOD PRESSURE: 84 MMHG | TEMPERATURE: 98 F | BODY MASS INDEX: 29.23 KG/M2 | WEIGHT: 171.2 LBS | SYSTOLIC BLOOD PRESSURE: 160 MMHG | HEIGHT: 64 IN | HEART RATE: 63 BPM | OXYGEN SATURATION: 99 %

## 2022-04-12 DIAGNOSIS — I10 HYPERTENSION GOAL BP (BLOOD PRESSURE) < 140/90: ICD-10-CM

## 2022-04-12 DIAGNOSIS — Z00.00 ROUTINE GENERAL MEDICAL EXAMINATION AT A HEALTH CARE FACILITY: Primary | ICD-10-CM

## 2022-04-12 DIAGNOSIS — R01.1 UNDIAGNOSED CARDIAC MURMURS: ICD-10-CM

## 2022-04-12 DIAGNOSIS — R10.2 PELVIC PAIN IN FEMALE: ICD-10-CM

## 2022-04-12 DIAGNOSIS — E78.5 HYPERLIPIDEMIA LDL GOAL <130: ICD-10-CM

## 2022-04-12 DIAGNOSIS — Z12.31 VISIT FOR SCREENING MAMMOGRAM: ICD-10-CM

## 2022-04-12 DIAGNOSIS — Z13.220 SCREENING FOR HYPERLIPIDEMIA: ICD-10-CM

## 2022-04-12 DIAGNOSIS — Z12.11 SCREEN FOR COLON CANCER: ICD-10-CM

## 2022-04-12 PROCEDURE — 99396 PREV VISIT EST AGE 40-64: CPT | Performed by: FAMILY MEDICINE

## 2022-04-12 PROCEDURE — 99213 OFFICE O/P EST LOW 20 MIN: CPT | Mod: 25 | Performed by: FAMILY MEDICINE

## 2022-04-12 ASSESSMENT — ENCOUNTER SYMPTOMS
SORE THROAT: 0
FEVER: 0
PALPITATIONS: 0
FREQUENCY: 0
ABDOMINAL PAIN: 0
DIZZINESS: 0
PARESTHESIAS: 0
CONSTIPATION: 0
COUGH: 0
HEARTBURN: 0
HEMATOCHEZIA: 0
SHORTNESS OF BREATH: 0
HEMATURIA: 0
CHILLS: 0
JOINT SWELLING: 0
DYSURIA: 0
EYE PAIN: 0
NERVOUS/ANXIOUS: 0
DIARRHEA: 0
BREAST MASS: 0
ARTHRALGIAS: 0
HEADACHES: 0
WEAKNESS: 0
NAUSEA: 0

## 2022-04-12 NOTE — PATIENT INSTRUCTIONS
You can call [phone] to schedule your Echocardiogram.  You should get a call to schedule the pelvic ultrasound as well.      Please schedule a fasting lab visit as well.  You will need to be fasting for 12 hours (nothing but water) prior to your blood work.        Preventive Health Recommendations  Female Ages 50 - 64    Yearly exam: See your health care provider every year in order to  Review health changes.   Discuss preventive care.    Review your medicines if your doctor has prescribed any.    Get a Pap test every three years (unless you have an abnormal result and your provider advises testing more often).  If you get Pap tests with HPV test, you only need to test every 5 years, unless you have an abnormal result.   You do not need a Pap test if your uterus was removed (hysterectomy) and you have not had cancer.  You should be tested each year for STDs (sexually transmitted diseases) if you're at risk.   Have a mammogram every 1 to 2 years.  Have a colonoscopy at age 50, or have a yearly FIT test (stool test). These exams screen for colon cancer.    Have a cholesterol test every 5 years, or more often if advised.  Have a diabetes test (fasting glucose) every three years. If you are at risk for diabetes, you should have this test more often.   If you are at risk for osteoporosis (brittle bone disease), think about having a bone density scan (DEXA).    Shots: Get a flu shot each year. Get a tetanus shot every 10 years.    Nutrition:   Eat at least 5 servings of fruits and vegetables each day.  Eat whole-grain bread, whole-wheat pasta and brown rice instead of white grains and rice.  Get adequate Calcium and Vitamin D.     Lifestyle  Exercise at least 150 minutes a week (30 minutes a day, 5 days a week). This will help you control your weight and prevent disease.  Limit alcohol to one drink per day.  No smoking.   Wear sunscreen to prevent skin cancer.   See your dentist every six months for an exam and  cleaning.  See your eye doctor every 1 to 2 years.

## 2022-04-12 NOTE — PROGRESS NOTES
SUBJECTIVE:   CC: Jena Kamara is an 56 year old woman who presents for preventive health visit.       Patient has been advised of split billing requirements and indicates understanding: Yes     Healthy Habits:     Getting at least 3 servings of Calcium per day:  NO    Bi-annual eye exam:  Yes    Dental care twice a year:  Yes    Sleep apnea or symptoms of sleep apnea:  None    Diet:  Regular (no restrictions) and Low salt    Frequency of exercise:  6-7 days/week    Duration of exercise:  30-45 minutes    Taking medications regularly:  Yes    Medication side effects:  None    PHQ-2 Total Score: 0    Additional concerns today:  No        Today's PHQ-2 Score:   PHQ-2 ( 1999 Pfizer) 4/12/2022   Q1: Little interest or pleasure in doing things 0   Q2: Feeling down, depressed or hopeless 0   PHQ-2 Score 0   PHQ-2 Total Score (12-17 Years)- Positive if 3 or more points; Administer PHQ-A if positive -   Q1: Little interest or pleasure in doing things Not at all   Q2: Feeling down, depressed or hopeless Not at all   PHQ-2 Score 0       Abuse: Current or Past (Physical, Sexual or Emotional) - No  Do you feel safe in your environment? Yes    Have you ever done Advance Care Planning? (For example, a Health Directive, POLST, or a discussion with a medical provider or your loved ones about your wishes): Yes, patient states has an Advance Care Planning document and will bring a copy to the clinic.    Social History     Tobacco Use     Smoking status: Never Smoker     Smokeless tobacco: Never Used   Substance Use Topics     Alcohol use: Yes     Comment: 1 drink per month         Alcohol Use 4/12/2022   Prescreen: >3 drinks/day or >7 drinks/week? Yes   Prescreen: >3 drinks/day or >7 drinks/week? -   AUDIT SCORE  4       Reviewed orders with patient.  Reviewed health maintenance and updated orders accordingly - Yes      Breast Cancer Screening:  Any new diagnosis of family breast, ovarian, or bowel cancer? No    FHS-7: No  flowsheet data found.    Mammogram Screening: Recommended annual mammography  Pertinent mammograms are reviewed under the imaging tab.    History of abnormal Pap smear: YES - updated in Problem List and Health Maintenance accordingly  PAP / HPV Latest Ref Rng & Units 2018   PAP (Historical) - NIL NIL NIL   HPV16 NEG:Negative Negative - -   HPV18 NEG:Negative Negative - -   HRHPV NEG:Negative Negative - -     Reviewed and updated as needed this visit by clinical staff   Tobacco  Allergies  Meds   Med Hx  Surg Hx  Fam Hx  Soc Hx          Reviewed and updated as needed this visit by Provider   Tobacco  Allergies  Meds   Med Hx  Surg Hx  Fam Hx  Soc Hx         Past Medical History:   Diagnosis Date     Abnormal Pap smear of cervix ?    level of abnormality??     Optic atrophy, unspecified     Optical Atrophy      Past Surgical History:   Procedure Laterality Date     SURGICAL HISTORY OF -       Sinus Surgery     SURGICAL HISTORY OF -              Review of Systems   Constitutional: Negative for chills and fever.   HENT: Negative for congestion, ear pain, hearing loss and sore throat.    Eyes: Negative for pain and visual disturbance.   Respiratory: Negative for cough and shortness of breath.    Cardiovascular: Negative for chest pain, palpitations and peripheral edema.   Gastrointestinal: Negative for abdominal pain, constipation, diarrhea, heartburn, hematochezia and nausea.   Breasts:  Negative for tenderness, breast mass and discharge.   Genitourinary: Negative for dysuria, frequency, genital sores, hematuria, pelvic pain, urgency, vaginal bleeding and vaginal discharge.   Musculoskeletal: Negative for arthralgias and joint swelling.   Skin: Negative for rash.   Neurological: Negative for dizziness, weakness, headaches and paresthesias.   Psychiatric/Behavioral: Negative for mood changes. The patient is not nervous/anxious.      Has been having some R sided pelvic  "discomfort.  Has some pain in the R lower quadrant that felt similar to a ruptured ovarian cyst that she had many years ago.   Will sometimes have some discomfort when she stretches.  Had an episode that was quite uncomfortable which ahs since resolved but not quite back to normal in the R low abdomen/pelvis     OBJECTIVE:   BP (!) 160/84   Pulse 63   Temp 98  F (36.7  C) (Tympanic)   Ht 1.619 m (5' 3.75\")   Wt 77.7 kg (171 lb 3.2 oz)   LMP  (LMP Unknown)   SpO2 99%   Breastfeeding No   BMI 29.62 kg/m    Physical Exam  GENERAL: healthy, alert and no distress  EYES: Eyes grossly normal to inspection, PERRL and conjunctivae and sclerae normal  NECK: no adenopathy, no asymmetry, masses, or scars and thyroid normal to palpation  RESP: lungs clear to auscultation - no rales, rhonchi or wheezes  BREAST: normal without masses, tenderness or nipple discharge and no palpable axillary masses or adenopathy  CV: regular rates and rhythm, normal S1 S2, no S3 or S4, grade 2-3/6 systolic murmur heard best over the LUSB, peripheral pulses strong and no peripheral edema  ABDOMEN: soft, nontender, no hepatosplenomegaly, no masses and bowel sounds normal  MS: no gross musculoskeletal defects noted, no edema  NEURO: Normal strength and tone, mentation intact and speech normal  PSYCH: mentation appears normal, affect normal/bright    Diagnostic Test Results:  Labs reviewed in Epic    ASSESSMENT/PLAN:       ICD-10-CM    1. Routine general medical examination at a health care facility  Z00.00    2. Hypertension goal BP (blood pressure) < 140/90  I10 Comprehensive metabolic panel (BMP + Alb, Alk Phos, ALT, AST, Total. Bili, TP)   3. Hyperlipidemia LDL goal <130  E78.5 Lipid panel reflex to direct LDL Fasting     Comprehensive metabolic panel (BMP + Alb, Alk Phos, ALT, AST, Total. Bili, TP)   4. Pelvic pain in female  R10.2 US Pelvic Complete with Transvaginal   5. Undiagnosed cardiac murmurs  R01.1 Echocardiogram Complete   6. " "Screening for hyperlipidemia  Z13.220 Lipid panel reflex to direct LDL Fasting   7. Screen for colon cancer  Z12.11 Fecal colorectal cancer screen FIT - Future (S+30)   8. Visit for screening mammogram  Z12.31 MA SCREENING DIGITAL BILAT - Future  (s+30)       HTN:  Home BP's well controlled, continue current meds    Lipids:  Due for labs as ordered    Pelvic pain:  Similar to previous cyst for pt but now postmenopausal .  Plan ultrasound as ordered    Murmur:  Noted previously but source not documented.  Echo    Patient has been advised of split billing requirements and indicates understanding: Yes    COUNSELING:  Reviewed preventive health counseling, as reflected in patient instructions    Estimated body mass index is 29.62 kg/m  as calculated from the following:    Height as of this encounter: 1.619 m (5' 3.75\").    Weight as of this encounter: 77.7 kg (171 lb 3.2 oz).    Weight management plan: Discussed healthy diet and exercise guidelines    She reports that she has never smoked. She has never used smokeless tobacco.      Counseling Resources:  ATP IV Guidelines  Pooled Cohorts Equation Calculator  Breast Cancer Risk Calculator  BRCA-Related Cancer Risk Assessment: FHS-7 Tool  FRAX Risk Assessment  ICSI Preventive Guidelines  Dietary Guidelines for Americans, 2010  USDA's MyPlate  ASA Prophylaxis  Lung CA Screening    [facility]    Patient Instructions   You can call [phone] to schedule your Echocardiogram.  You should get a call to schedule the pelvic ultrasound as well.      Please schedule a fasting lab visit as well.  You will need to be fasting for 12 hours (nothing but water) prior to your blood work.        Preventive Health Recommendations  Female Ages 50 - 64    Yearly exam: See your health care provider every year in order to  o Review health changes.   o Discuss preventive care.    o Review your medicines if your doctor has prescribed any.      Get a Pap " test every three years (unless you have an abnormal result and your provider advises testing more often).    If you get Pap tests with HPV test, you only need to test every 5 years, unless you have an abnormal result.     You do not need a Pap test if your uterus was removed (hysterectomy) and you have not had cancer.    You should be tested each year for STDs (sexually transmitted diseases) if you're at risk.     Have a mammogram every 1 to 2 years.    Have a colonoscopy at age 50, or have a yearly FIT test (stool test). These exams screen for colon cancer.      Have a cholesterol test every 5 years, or more often if advised.    Have a diabetes test (fasting glucose) every three years. If you are at risk for diabetes, you should have this test more often.     If you are at risk for osteoporosis (brittle bone disease), think about having a bone density scan (DEXA).    Shots: Get a flu shot each year. Get a tetanus shot every 10 years.    Nutrition:     Eat at least 5 servings of fruits and vegetables each day.    Eat whole-grain bread, whole-wheat pasta and brown rice instead of white grains and rice.    Get adequate Calcium and Vitamin D.     Lifestyle    Exercise at least 150 minutes a week (30 minutes a day, 5 days a week). This will help you control your weight and prevent disease.    Limit alcohol to one drink per day.    No smoking.     Wear sunscreen to prevent skin cancer.     See your dentist every six months for an exam and cleaning.    See your eye doctor every 1 to 2 years.

## 2022-04-12 NOTE — NURSING NOTE
"Initial BP (!) 160/84   Pulse 63   Temp 98  F (36.7  C) (Tympanic)   Ht 1.619 m (5' 3.75\")   Wt 77.7 kg (171 lb 3.2 oz)   LMP  (LMP Unknown)   SpO2 99%   Breastfeeding No   BMI 29.62 kg/m   Estimated body mass index is 29.62 kg/m  as calculated from the following:    Height as of this encounter: 1.619 m (5' 3.75\").    Weight as of this encounter: 77.7 kg (171 lb 3.2 oz). .      "

## 2022-04-15 ENCOUNTER — HOSPITAL ENCOUNTER (OUTPATIENT)
Dept: ULTRASOUND IMAGING | Facility: CLINIC | Age: 57
Discharge: HOME | End: 2022-04-15
Attending: FAMILY MEDICINE | Admitting: FAMILY MEDICINE
Payer: COMMERCIAL

## 2022-04-15 DIAGNOSIS — R10.2 PELVIC PAIN IN FEMALE: ICD-10-CM

## 2022-04-15 PROCEDURE — 76856 US EXAM PELVIC COMPLETE: CPT

## 2022-04-24 DIAGNOSIS — I10 HYPERTENSION GOAL BP (BLOOD PRESSURE) < 140/90: ICD-10-CM

## 2022-04-25 ENCOUNTER — HOSPITAL ENCOUNTER (OUTPATIENT)
Dept: CARDIOLOGY | Facility: CLINIC | Age: 57
Discharge: HOME | End: 2022-04-25
Attending: FAMILY MEDICINE | Admitting: FAMILY MEDICINE
Payer: COMMERCIAL

## 2022-04-25 DIAGNOSIS — R01.1 UNDIAGNOSED CARDIAC MURMURS: ICD-10-CM

## 2022-04-25 LAB — LVEF ECHO: NORMAL

## 2022-04-25 PROCEDURE — 93306 TTE W/DOPPLER COMPLETE: CPT

## 2022-04-25 PROCEDURE — 93306 TTE W/DOPPLER COMPLETE: CPT | Mod: 26 | Performed by: INTERNAL MEDICINE

## 2022-04-25 NOTE — TELEPHONE ENCOUNTER
Routing refill request to provider for review/approval because:  Patient has a lab appointment on 4/28/22.  Yann Bolaños RN

## 2022-04-26 ENCOUNTER — APPOINTMENT (OUTPATIENT)
Dept: LAB | Facility: CLINIC | Age: 57
End: 2022-04-26
Payer: COMMERCIAL

## 2022-04-26 PROCEDURE — 82274 ASSAY TEST FOR BLOOD FECAL: CPT

## 2022-04-26 RX ORDER — AMLODIPINE BESYLATE 5 MG/1
TABLET ORAL
Qty: 30 TABLET | Refills: 0 | Status: SHIPPED | OUTPATIENT
Start: 2022-04-26 | End: 2022-06-03

## 2022-04-28 ENCOUNTER — LAB (OUTPATIENT)
Dept: LAB | Facility: CLINIC | Age: 57
End: 2022-04-28
Payer: COMMERCIAL

## 2022-04-28 DIAGNOSIS — E78.5 HYPERLIPIDEMIA LDL GOAL <130: ICD-10-CM

## 2022-04-28 DIAGNOSIS — I10 HYPERTENSION GOAL BP (BLOOD PRESSURE) < 140/90: ICD-10-CM

## 2022-04-28 DIAGNOSIS — Z13.220 SCREENING FOR HYPERLIPIDEMIA: ICD-10-CM

## 2022-04-28 LAB
ALBUMIN SERPL-MCNC: 3.9 G/DL (ref 3.4–5)
ALP SERPL-CCNC: 68 U/L (ref 40–150)
ALT SERPL W P-5'-P-CCNC: 42 U/L (ref 0–50)
ANION GAP SERPL CALCULATED.3IONS-SCNC: 6 MMOL/L (ref 3–14)
AST SERPL W P-5'-P-CCNC: 22 U/L (ref 0–45)
BILIRUB SERPL-MCNC: 0.4 MG/DL (ref 0.2–1.3)
BUN SERPL-MCNC: 21 MG/DL (ref 7–30)
CALCIUM SERPL-MCNC: 8.9 MG/DL (ref 8.5–10.1)
CHLORIDE BLD-SCNC: 107 MMOL/L (ref 94–109)
CHOLEST SERPL-MCNC: 244 MG/DL
CO2 SERPL-SCNC: 29 MMOL/L (ref 20–32)
CREAT SERPL-MCNC: 0.72 MG/DL (ref 0.52–1.04)
FASTING STATUS PATIENT QL REPORTED: YES
GFR SERPL CREATININE-BSD FRML MDRD: >90 ML/MIN/1.73M2
GLUCOSE BLD-MCNC: 113 MG/DL (ref 70–99)
HDLC SERPL-MCNC: 63 MG/DL
LDLC SERPL CALC-MCNC: 167 MG/DL
NONHDLC SERPL-MCNC: 181 MG/DL
POTASSIUM BLD-SCNC: 4.6 MMOL/L (ref 3.4–5.3)
PROT SERPL-MCNC: 7.3 G/DL (ref 6.8–8.8)
SODIUM SERPL-SCNC: 142 MMOL/L (ref 133–144)
TRIGL SERPL-MCNC: 72 MG/DL

## 2022-04-28 PROCEDURE — 36415 COLL VENOUS BLD VENIPUNCTURE: CPT

## 2022-04-28 PROCEDURE — 80053 COMPREHEN METABOLIC PANEL: CPT

## 2022-04-28 PROCEDURE — 80061 LIPID PANEL: CPT

## 2022-04-29 DIAGNOSIS — Z12.11 SCREEN FOR COLON CANCER: ICD-10-CM

## 2022-04-29 LAB — HEMOCCULT STL QL IA: NEGATIVE

## 2022-05-03 ENCOUNTER — HOSPITAL ENCOUNTER (OUTPATIENT)
Dept: MAMMOGRAPHY | Facility: CLINIC | Age: 57
Discharge: HOME | End: 2022-05-03
Attending: FAMILY MEDICINE | Admitting: FAMILY MEDICINE
Payer: COMMERCIAL

## 2022-05-03 DIAGNOSIS — Z12.31 VISIT FOR SCREENING MAMMOGRAM: ICD-10-CM

## 2022-05-03 PROCEDURE — 77067 SCR MAMMO BI INCL CAD: CPT

## 2022-05-04 ENCOUNTER — TELEPHONE (OUTPATIENT)
Dept: FAMILY MEDICINE | Facility: CLINIC | Age: 57
End: 2022-05-04
Payer: COMMERCIAL

## 2022-05-04 DIAGNOSIS — L71.9 ROSACEA: Primary | ICD-10-CM

## 2022-06-03 DIAGNOSIS — I10 HYPERTENSION GOAL BP (BLOOD PRESSURE) < 140/90: ICD-10-CM

## 2022-06-03 RX ORDER — AMLODIPINE BESYLATE 5 MG/1
TABLET ORAL
Qty: 90 TABLET | Refills: 3 | Status: SHIPPED | OUTPATIENT
Start: 2022-06-03 | End: 2023-06-20

## 2022-06-03 NOTE — TELEPHONE ENCOUNTER
Routing refill request to provider for review/approval because:  Last recorded blood pressure does not meet RN protocol parameters    William Mahmood RN

## 2022-08-24 ENCOUNTER — IMMUNIZATION (OUTPATIENT)
Dept: FAMILY MEDICINE | Facility: CLINIC | Age: 57
End: 2022-08-24
Payer: COMMERCIAL

## 2022-08-24 DIAGNOSIS — Z23 HIGH PRIORITY FOR 2019-NCOV VACCINE: Primary | ICD-10-CM

## 2022-08-24 PROCEDURE — 91305 COVID-19,PF,PFIZER (12+ YRS): CPT

## 2022-08-24 PROCEDURE — 99207 PR NO CHARGE NURSE ONLY: CPT

## 2022-08-24 PROCEDURE — 0054A COVID-19,PF,PFIZER (12+ YRS): CPT

## 2022-11-19 ENCOUNTER — HEALTH MAINTENANCE LETTER (OUTPATIENT)
Age: 57
End: 2022-11-19

## 2023-03-02 ENCOUNTER — VIRTUAL VISIT (OUTPATIENT)
Dept: FAMILY MEDICINE | Facility: CLINIC | Age: 58
End: 2023-03-02
Payer: COMMERCIAL

## 2023-03-02 DIAGNOSIS — U07.1 INFECTION DUE TO 2019 NOVEL CORONAVIRUS: Primary | ICD-10-CM

## 2023-03-02 PROCEDURE — 99213 OFFICE O/P EST LOW 20 MIN: CPT | Mod: CS | Performed by: FAMILY MEDICINE

## 2023-03-02 NOTE — PATIENT INSTRUCTIONS
Please decrease your amlodipine dose to 2.5mg (1/2 tablet) for 8 days.  You can then resume your normal dosing.    Feel better soon!

## 2023-03-02 NOTE — PROGRESS NOTES
Jena is a 57 year old who is being evaluated via a billable video visit.      How would you like to obtain your AVS? MyChart  If the video visit is dropped, the invitation should be resent by: Text to cell phone: [phone]  Will anyone else be joining your video visit? No          A/p:      ICD-10-CM    1. Infection due to 2019 novel coronavirus  U07.1 nirmatrelvir and ritonavir (PAXLOVID) therapy pack        Discussed EUA for Paxlovid, role in reduction of risk of severe disease, hospitalization and death.  Also discussed potential to shorten course of symptoms by an unknown amount.  Reviewed side effects and drug drug interactions.    Will decrease amlodipine to 2.5mg daily for 8 days.    Reviewed isolation guidelines and reasons for follow up.    All questions answered.      Subjective   Jena is a 57 year old, presenting for the following health issues:  Covid Concern      HPI       COVID-19 Symptom Review  How many days ago did these symptoms start? 4 days, positive rapid test yesterday      Are any of the following symptoms significant for you?    New or worsening difficulty breathing? No    Worsening cough? Yes, it's a dry cough.     Fever or chills? Yes, the highest temperature was 100.3    Headache: YES    Sore throat: YES    Chest pain: No    Diarrhea: No    Body aches? YES    Runny/stuffy nose, fatigue    What treatments has patient tried? Guaifenesin (mucinex) and advil pm    Does patient live in a nursing home, group home, or shelter? No  Does patient have a way to get food/medications during quarantined? Yes, I have a friend or family member who can help me.            Sore throat on Tuesday and did not feel well all day yesterday.  Noted a fever yesterday afternoon which is why she tested.  This is her first covid infection.  She is vaccinated but has not had the most recent booster.      Review of Systems         Objective           Vitals:  No vitals were obtained today due to virtual  visit.    Physical Exam   GENERAL: Healthy, alert and no distress  EYES: Eyes grossly normal to inspection.  No discharge or erythema, or obvious scleral/conjunctival abnormalities.  RESP: No audible wheeze, cough, or visible cyanosis.  No visible retractions or increased work of breathing.    SKIN: Visible skin clear. No significant rash, abnormal pigmentation or lesions.  NEURO: Cranial nerves grossly intact.  Mentation and speech appropriate for age.  PSYCH: Mentation appears normal, affect normal/bright, judgement and insight intact, normal speech and appearance well-groomed.    Epic reviewed            Video-Visit Details    Type of service:  Video Visit     Originating Location (pt. Location): Home  Distant Location (provider location):  On-site  Platform used for Video Visit: [employer]ara

## 2023-03-15 ENCOUNTER — TELEPHONE (OUTPATIENT)
Dept: FAMILY MEDICINE | Facility: CLINIC | Age: 58
End: 2023-03-15
Payer: COMMERCIAL

## 2023-03-15 NOTE — TELEPHONE ENCOUNTER
Reason for call:  Patient reporting a symptom    Symptom or request: Pt was diag with COVID 3/1 and finished Paxlovid Rx. Pt is still having nasal congestion and cannot go into work.  Please call patient and advise.      Duration (how long have symptoms been present): ongoing    Have you been treated for this before? Yes    Additional comments:     Phone Number patient can be reached at:  Home number on file [phone] (home)    Best Time:  any    Can we leave a detailed message on this number:  YES    Call taken on 3/15/2023 at 8:33 AM by Brittny Longoria

## 2023-03-15 NOTE — TELEPHONE ENCOUNTER
Call placed to Patient  States that she still has nasal congestion  Complaining of a slight headache  Denies fever  States that Patient's  and her had covid at the same time and he is feeling so much better  Patient is able to work from [place]  Recommended continuing treating symptoms  Has had sinus infections in the past and knows what to look for  Verbalized understanding  Perry Griffin RN

## 2023-06-01 ENCOUNTER — HEALTH MAINTENANCE LETTER (OUTPATIENT)
Age: 58
End: 2023-06-01

## 2023-06-17 DIAGNOSIS — I10 HYPERTENSION GOAL BP (BLOOD PRESSURE) < 140/90: ICD-10-CM

## 2023-06-19 NOTE — TELEPHONE ENCOUNTER
"Routing refill request to provider for review/approval because:  Due for an annual exam. Last one was 4/12/2022            Requested Prescriptions   Pending Prescriptions Disp Refills    amLODIPine (NORVASC) 5 MG tablet [Pharmacy Med Name: amLODIPine Besylate Oral Tablet 5 MG] 90 tablet 0     Sig: Take 1 tablet (5 mg) by mouth once daily       Calcium Channel Blockers Protocol  Failed - 6/17/2023  2:00 AM        Failed - Blood pressure under 140/90 in past 12 months     BP Readings from Last 3 Encounters:   04/12/22 (!) 160/84   03/06/20 128/86   05/13/19 138/88                 Failed - Normal serum creatinine on file in past 12 months     Recent Labs   Lab Test 04/28/22  0728   CR 0.72       Ok to refill medication if creatinine is low          Passed - Recent (12 mo) or future (30 days) visit within the authorizing provider's specialty     Patient has had an office visit with the authorizing provider or a provider within the authorizing providers department within the previous 12 mos or has a future within next 30 days. See \"Patient Info\" tab in inbasket, or \"Choose Columns\" in Meds & Orders section of the refill encounter.              Passed - Medication is active on med list        Passed - Patient is age 18 or older        Passed - No active pregnancy on record        Passed - No positive pregnancy test in past 12 months                 Linda Virk RN 06/19/23 2:09 PM    "

## 2023-06-20 RX ORDER — AMLODIPINE BESYLATE 5 MG/1
TABLET ORAL
Qty: 90 TABLET | Refills: 0 | Status: SHIPPED | OUTPATIENT
Start: 2023-06-20 | End: 2023-07-25

## 2023-07-25 ENCOUNTER — OFFICE VISIT (OUTPATIENT)
Dept: FAMILY MEDICINE | Facility: CLINIC | Age: 58
End: 2023-07-25
Payer: COMMERCIAL

## 2023-07-25 VITALS
TEMPERATURE: 97.4 F | BODY MASS INDEX: 29.88 KG/M2 | HEART RATE: 62 BPM | HEIGHT: 64 IN | SYSTOLIC BLOOD PRESSURE: 164 MMHG | DIASTOLIC BLOOD PRESSURE: 94 MMHG | RESPIRATION RATE: 18 BRPM | OXYGEN SATURATION: 97 % | WEIGHT: 175 LBS

## 2023-07-25 DIAGNOSIS — Z12.4 CERVICAL CANCER SCREENING: ICD-10-CM

## 2023-07-25 DIAGNOSIS — Z12.31 ENCOUNTER FOR SCREENING MAMMOGRAM FOR BREAST CANCER: ICD-10-CM

## 2023-07-25 DIAGNOSIS — Z13.1 SCREENING FOR DIABETES MELLITUS: ICD-10-CM

## 2023-07-25 DIAGNOSIS — Z00.01 ENCOUNTER FOR ROUTINE ADULT MEDICAL EXAM WITH ABNORMAL FINDINGS: Primary | ICD-10-CM

## 2023-07-25 DIAGNOSIS — R09.81 NASAL CONGESTION: ICD-10-CM

## 2023-07-25 DIAGNOSIS — I10 HYPERTENSION GOAL BP (BLOOD PRESSURE) < 140/90: ICD-10-CM

## 2023-07-25 DIAGNOSIS — Z12.11 SCREEN FOR COLON CANCER: ICD-10-CM

## 2023-07-25 DIAGNOSIS — Z13.220 SCREENING FOR LIPID DISORDERS: ICD-10-CM

## 2023-07-25 DIAGNOSIS — Z23 NEED FOR VACCINATION: ICD-10-CM

## 2023-07-25 DIAGNOSIS — Z12.31 VISIT FOR SCREENING MAMMOGRAM: ICD-10-CM

## 2023-07-25 PROCEDURE — G0145 SCR C/V CYTO,THINLAYER,RESCR: HCPCS | Performed by: FAMILY MEDICINE

## 2023-07-25 PROCEDURE — 0121A COVID-19 BIVALENT 12+ (PFIZER): CPT | Performed by: FAMILY MEDICINE

## 2023-07-25 PROCEDURE — 90471 IMMUNIZATION ADMIN: CPT | Performed by: FAMILY MEDICINE

## 2023-07-25 PROCEDURE — 90750 HZV VACC RECOMBINANT IM: CPT | Performed by: FAMILY MEDICINE

## 2023-07-25 PROCEDURE — 91312 COVID-19 BIVALENT 12+ (PFIZER): CPT | Performed by: FAMILY MEDICINE

## 2023-07-25 PROCEDURE — 99213 OFFICE O/P EST LOW 20 MIN: CPT | Mod: 25 | Performed by: FAMILY MEDICINE

## 2023-07-25 PROCEDURE — 99396 PREV VISIT EST AGE 40-64: CPT | Mod: 25 | Performed by: FAMILY MEDICINE

## 2023-07-25 PROCEDURE — 90715 TDAP VACCINE 7 YRS/> IM: CPT | Performed by: FAMILY MEDICINE

## 2023-07-25 PROCEDURE — 90472 IMMUNIZATION ADMIN EACH ADD: CPT | Performed by: FAMILY MEDICINE

## 2023-07-25 PROCEDURE — 87624 HPV HI-RISK TYP POOLED RSLT: CPT | Performed by: FAMILY MEDICINE

## 2023-07-25 RX ORDER — AMLODIPINE BESYLATE 5 MG/1
2.5 TABLET ORAL DAILY
Qty: 45 TABLET | Refills: 3 | Status: SHIPPED | OUTPATIENT
Start: 2023-07-25 | End: 2024-10-03

## 2023-07-25 ASSESSMENT — ENCOUNTER SYMPTOMS
MYALGIAS: 0
SHORTNESS OF BREATH: 0
NAUSEA: 0
FEVER: 0
EYE PAIN: 0
CONSTIPATION: 0
HEADACHES: 0
DIZZINESS: 1
CHILLS: 0
ARTHRALGIAS: 0
PALPITATIONS: 0
PARESTHESIAS: 0
ABDOMINAL PAIN: 0
NAUSEA: 1
BREAST MASS: 0
COUGH: 0
HEMATURIA: 0
JOINT SWELLING: 0
FREQUENCY: 0
DIARRHEA: 0
SORE THROAT: 0
DYSURIA: 0
HEARTBURN: 0
HEMATOCHEZIA: 0
NERVOUS/ANXIOUS: 0
WEAKNESS: 0

## 2023-07-25 NOTE — PROGRESS NOTES
SUBJECTIVE:   CC: Jena is an 57 year old who presents for preventive health visit.       7/25/2023    11:15 AM   Additional Questions   Roomed by MUKESH Monreal   Accompanied by self     Healthy Habits:     Getting at least 3 servings of Calcium per day:  Yes    Bi-annual eye exam:  Yes    Dental care twice a year:  Yes    Sleep apnea or symptoms of sleep apnea:  None    Diet:  Low salt    Frequency of exercise:  6-7 days/week    Duration of exercise:  45-60 minutes    Taking medications regularly:  Yes    Additional concerns today:  No        Hypertension Follow-up  Wants to discuss BP medication.  Reporting some lightheadedness when going to stand up for sitting down and has had lower BP readings at home.    Also reporting that she will feel coldness in her hands and feet.   Do you check your blood pressure regularly outside of the clinic? Yes   Are you following a low salt diet? No but does limit herself   Are your blood pressures ever more than 140 on the top number (systolic) OR more   than 90 on the bottom number (diastolic), for example 140/90? No 115/76 , 99/68, 121/68      Feels that she is running low at home   BP Readings from Last 6 Encounters:   07/25/23 (!) 164/94   04/12/22 (!) 160/84   03/06/20 128/86   05/13/19 138/88   11/12/18 124/78   07/23/18 144/90         Additional states that she is having a fullness/ congestion in ears,  intermittent left ear pain and sinus symptoms.    Today's PHQ-2 Score:       7/25/2023    11:11 AM   PHQ-2 ( 1999 Pfizer)   Q1: Little interest or pleasure in doing things 0   Q2: Feeling down, depressed or hopeless 0   PHQ-2 Score 0   Q1: Little interest or pleasure in doing things Not at all   Q2: Feeling down, depressed or hopeless Not at all   PHQ-2 Score 0           Social History     Tobacco Use    Smoking status: Never    Smokeless tobacco: Never   Substance Use Topics    Alcohol use: Yes     Comment: 1 drink per month             7/25/2023    11:10 AM   Alcohol  Use   Prescreen: >3 drinks/day or >7 drinks/week? No         2022     2:50 PM   AUDIT - Alcohol Use Disorders Identification Test - Reproduced from the World Health Organization Audit 2001 (Second Edition)   1.  How often do you have a drink containing alcohol? 2 to 3 times a week   2.  How many drinks containing alcohol do you have on a typical day when you are drinking? 1 or 2   3.  How often do you have five or more drinks on one occasion? Less than monthly   4.  How often during the last year have you found that you were not able to stop drinking once you had started? Never   5.  How often during the last year have you failed to do what was normally expected of you because of drinking? Never   6.  How often during the last year have you needed a first drink in the morning to get yourself going after a heavy drinking session? Never   7.  How often during the last year have you had a feeling of guilt or remorse after drinking? Never   8.  How often during the last year have you been unable to remember what happened the night before because of your drinking? Never   9.  Have you or someone else been injured because of your drinking? No   10. Has a relative, friend, doctor or other health care worker been concerned about your drinking or suggested you cut down? No   TOTAL SCORE 4     Reviewed orders with patient.  Reviewed health maintenance and updated orders accordingly - Yes      Breast Cancer Screenin/12/2022     2:51 PM 5/3/2022     5:42 PM   Breast CA Risk Assessment (FHS-7)   Do you have a family history of breast, colon, or ovarian cancer? No / Unknown No / Unknown           Pertinent mammograms are reviewed under the imaging tab.    History of abnormal Pap smear:       Latest Ref Rng & Units 2018     4:24 PM 2018     4:12 PM 2012     5:03 PM   PAP / HPV   PAP (Historical)  NIL   NIL    HPV 16 DNA NEG^Negative  Negative     HPV 18 DNA NEG^Negative  Negative     Other HR HPV  "NEG^Negative  Negative       Reviewed and updated as needed this visit by clinical staff   Tobacco                Reviewed and updated as needed this visit by Provider                     Review of Systems   Constitutional:  Negative for chills and fever.   HENT:  Positive for congestion and ear pain. Negative for hearing loss and sore throat.    Eyes:  Negative for pain and visual disturbance.   Respiratory:  Negative for cough and shortness of breath.    Cardiovascular:  Negative for chest pain, palpitations and peripheral edema.   Gastrointestinal:  Negative for abdominal pain, constipation, diarrhea, heartburn, hematochezia and nausea.   Breasts:  Negative for tenderness, breast mass and discharge.   Genitourinary:  Negative for dysuria, frequency, genital sores, hematuria, pelvic pain, urgency, vaginal bleeding and vaginal discharge.   Musculoskeletal:  Negative for arthralgias, joint swelling and myalgias.   Skin:  Negative for rash.   Neurological:  Negative for weakness, headaches and paresthesias.   Psychiatric/Behavioral:  Positive for mood changes. The patient is not nervous/anxious.         OBJECTIVE:   BP (!) 164/94   Pulse 62   Temp 97.4  F (36.3  C) (Tympanic)   Resp 18   Ht 1.635 m (5' 4.37\")   Wt 79.4 kg (175 lb)   LMP  (LMP Unknown)   SpO2 97%   BMI 29.69 kg/m    Physical Exam  GENERAL: healthy, alert and no distress  EYES: Eyes grossly normal to inspection, PERRL and conjunctivae and sclerae normal  HENT: ear canals and TM's normal, nose and mouth without ulcers or lesions  NECK: no adenopathy, no asymmetry, masses, or scars and thyroid normal to palpation  RESP: lungs clear to auscultation - no rales, rhonchi or wheezes  BREAST: normal without masses, tenderness or nipple discharge and no palpable axillary masses or adenopathy  CV: regular rate and rhythm, normal S1 S2, no S3 or S4, no murmur, click or rub, no peripheral edema and peripheral pulses strong  ABDOMEN: soft, nontender, no " hepatosplenomegaly, no masses and bowel sounds normal   (female): normal female external genitalia, normal urethral meatus, vaginal mucosa pink, moist, well rugated, and normal cervix/adnexa/uterus without masses or discharge  MS: no gross musculoskeletal defects noted, no edema  SKIN: no suspicious lesions or rashes  NEURO: Normal strength and tone, mentation intact and speech normal  PSYCH: mentation appears normal, affect normal/bright      ASSESSMENT/PLAN:   (Z00.01) Encounter for routine adult medical exam with abnormal findings  (primary encounter diagnosis)  Comment: We discussed self breast exams, exercise 30mins/day, and calcium with vitamin D at 1200mg/day, preferably from dietary sources.  Diet, Weight loss, and Exercise were discussed as well.  Discussed vaccinations     Plan: ZOSTER RECOMBINANT ADJUVANTED (SHINGRIX), TDAP         10-64Y (ADACEL,BOOSTRIX), COVID-19 BIVALENT 12+        (PFIZER)            (Z12.11) Screen for colon cancer  Comment: discussed. She will do colonscopy   Plan: Colonoscopy Screening  Referral            (Z12.31) Visit for screening mammogram  Comment: discussed   Plan: MA Screen Bilateral w/Jacob            (Z12.4) Cervical cancer screening  Comment: discussed   Plan: Pap Screen with HPV - recommended age 30 - 65         years            (I10) Hypertension goal BP (blood pressure) < 140/90  Comment: she will try 2.5mg dose and calibrate home machine. Then document blood pressure across times/locations to ensure accuracy. Send me e visit with data. The patient indicates understanding of these issues and agrees with the plan.   Plan: amLODIPine (NORVASC) 5 MG tablet            (R09.81) Nasal congestion  Comment: discussed allergy meds. Recommended switch to cetirizine and flonase use regularly. The patient indicates understanding of these issues and agrees with the plan.   Plan:     (Z23) Need for vaccination  Comment: discussed   Plan:     (Z12.31) Encounter for  screening mammogram for breast cancer  Comment: discussed   Plan: MA Screen Bilateral w/Jacob            (Z13.220) Screening for lipid disorders  Comment: discussed   Plan: Lipid panel reflex to direct LDL Fasting            (Z13.1) Screening for diabetes mellitus  Comment: discussed   Plan: Glucose            Patient has been advised of split billing requirements and indicates understanding: Yes      COUNSELING:  Reviewed preventive health counseling, as reflected in patient instructions       Regular exercise       Healthy diet/nutrition        She reports that she has never smoked. She has never used smokeless tobacco.          Monik Fraga MD  [facility]

## 2023-07-25 NOTE — NURSING NOTE
Prior to immunization administration, verified patients identity using patient s name and date of birth. Please see Immunization Activity for additional information.     Screening Questionnaire for Adult Immunization    Are you sick today?   No   Do you have allergies to medications, food, a vaccine component or latex?   Yes   Have you ever had a serious reaction after receiving a vaccination?   NO just flu like symptoms    Do you have a long-term health problem with heart, lung, kidney, or metabolic disease (e.g., diabetes), asthma, a blood disorder, no spleen, complement component deficiency, a cochlear implant, or a spinal fluid leak?  Are you on long-term aspirin therapy?   No   Do you have cancer, leukemia, HIV/AIDS, or any other immune system problem?   No   Do you have a parent, brother, or sister with an immune system problem?   No   In the past 3 months, have you taken medications that affect  your immune system, such as prednisone, other steroids, or anticancer drugs; drugs for the treatment of rheumatoid arthritis, Crohn s disease, or psoriasis; or have you had radiation treatments?   No   Have you had a seizure, or a brain or other nervous system problem?   No   During the past year, have you received a transfusion of blood or blood    products, or been given immune (gamma) globulin or antiviral drug?   No   For women: Are you pregnant or is there a chance you could become       pregnant during the next month?   No   Have you received any vaccinations in the past 4 weeks?   No     Immunization questionnaire was positive for at least one answer.  Notified MD AWARE.      Patient instructed to remain in clinic for 15 minutes afterwards, and to report any adverse reactions.     Screening performed by Jocelin Wilks MA on 7/25/2023 at 12:26 PM.

## 2023-07-31 LAB
BKR LAB AP GYN ADEQUACY: NORMAL
BKR LAB AP GYN INTERPRETATION: NORMAL
BKR LAB AP HPV REFLEX: NORMAL
BKR LAB AP PREVIOUS ABNORMAL: NORMAL
PATH REPORT.COMMENTS IMP SPEC: NORMAL
PATH REPORT.COMMENTS IMP SPEC: NORMAL
PATH REPORT.RELEVANT HX SPEC: NORMAL

## 2023-08-02 LAB
HUMAN PAPILLOMA VIRUS 16 DNA: NEGATIVE
HUMAN PAPILLOMA VIRUS 18 DNA: NEGATIVE
HUMAN PAPILLOMA VIRUS FINAL DIAGNOSIS: NORMAL
HUMAN PAPILLOMA VIRUS OTHER HR: NEGATIVE

## 2023-08-29 ENCOUNTER — HOSPITAL ENCOUNTER (OUTPATIENT)
Dept: MAMMOGRAPHY | Facility: CLINIC | Age: 58
Discharge: HOME | End: 2023-08-29
Attending: FAMILY MEDICINE | Admitting: FAMILY MEDICINE
Payer: COMMERCIAL

## 2023-08-29 DIAGNOSIS — Z12.31 VISIT FOR SCREENING MAMMOGRAM: ICD-10-CM

## 2023-08-29 DIAGNOSIS — Z12.31 ENCOUNTER FOR SCREENING MAMMOGRAM FOR BREAST CANCER: ICD-10-CM

## 2023-08-29 PROCEDURE — 77067 SCR MAMMO BI INCL CAD: CPT

## 2023-09-20 ENCOUNTER — MYC MEDICAL ADVICE (OUTPATIENT)
Dept: FAMILY MEDICINE | Facility: CLINIC | Age: 58
End: 2023-09-20
Payer: COMMERCIAL

## 2023-09-20 DIAGNOSIS — N62 LARGE BREASTS: ICD-10-CM

## 2023-09-20 DIAGNOSIS — D22.9 CHANGE IN MOLE: Primary | ICD-10-CM

## 2023-12-13 ENCOUNTER — ANESTHESIA EVENT (OUTPATIENT)
Dept: GASTROENTEROLOGY | Facility: CLINIC | Age: 58
End: 2023-12-13
Payer: COMMERCIAL

## 2023-12-13 NOTE — ANESTHESIA PREPROCEDURE EVALUATION
Anesthesia Pre-Procedure Evaluation    Patient: Jena Kamara   MRN: [id number] : 1965        Procedure : Procedure(s):  Colonoscopy          Past Medical History:   Diagnosis Date    Abnormal Pap smear of cervix ?    level of abnormality??    Optic atrophy, unspecified     Optical Atrophy      Past Surgical History:   Procedure Laterality Date    SURGICAL HISTORY OF -   1985    Sinus Surgery    SURGICAL HISTORY OF -   1993          Allergies   Allergen Reactions    Pcn [Penicillins]       Social History     Tobacco Use    Smoking status: Never    Smokeless tobacco: Never   Substance Use Topics    Alcohol use: Yes     Comment: 1 drink per month      Wt Readings from Last 1 Encounters:   23 79.4 kg (175 lb)        Anesthesia Evaluation   Pt has had prior anesthetic. Type: General.        ROS/MED HX  ENT/Pulmonary:       Neurologic:       Cardiovascular:     (+) Dyslipidemia hypertension- -   -  - -                                 Previous cardiac testing   Echo: Date: 22 Results:  Interpretation Summary     Left ventricular size, wall motion and function are normal. The ejection  fraction is 60-65%.  Normal right ventricle size and systolic function.    Stress Test:  Date: Results:    ECG Reviewed:  Date: Results:    Cath:  Date: Results:      METS/Exercise Tolerance:     Hematologic:       Musculoskeletal:       GI/Hepatic:       Renal/Genitourinary:       Endo:       Psychiatric/Substance Use:       Infectious Disease:       Malignancy:       Other:            Physical Exam    Airway        Mallampati: II   TM distance: > 3 FB   Neck ROM: full   Mouth opening: > 3 cm    Respiratory Devices and Support  Comment: Not on oxygen currently       Dental  no notable dental history         Cardiovascular   cardiovascular exam normal          Pulmonary   pulmonary exam normal                OUTSIDE LABS:  CBC:   Lab Results   Component Value Date    WBC 7.8 2011    WBC 6.2  11/01/2007    HGB 14.6 11/17/2011    HGB 13.0 11/01/2007    HCT 43.8 11/17/2011    HCT 39.2 11/01/2007     11/17/2011     11/01/2007     BMP:   Lab Results   Component Value Date     04/28/2022     03/06/2020    POTASSIUM 4.6 04/28/2022    POTASSIUM 4.6 03/06/2020    CHLORIDE 107 04/28/2022    CHLORIDE 105 03/06/2020    CO2 29 04/28/2022    CO2 31 03/06/2020    BUN 21 04/28/2022    BUN 18 03/06/2020    CR 0.72 04/28/2022    CR 0.82 03/06/2020     (H) 04/28/2022    GLC 83 03/06/2020     COAGS:   Lab Results   Component Value Date    PTT 29 11/01/2007    INR 1.01 11/01/2007     POC:   Lab Results   Component Value Date    HCG Negative 11/17/2011     HEPATIC:   Lab Results   Component Value Date    ALBUMIN 3.9 04/28/2022    PROTTOTAL 7.3 04/28/2022    ALT 42 04/28/2022    AST 22 04/28/2022    ALKPHOS 68 04/28/2022    BILITOTAL 0.4 04/28/2022     OTHER:   Lab Results   Component Value Date    ARTURO 8.9 04/28/2022    PHOS 3.2 11/01/2007    LIPASE 111 11/17/2011    CRP <3.0 03/21/2007    SED 4 03/21/2007       Anesthesia Plan    ASA Status:  2       Anesthesia Type: General.   Induction: Intravenous, Propofol.   Maintenance: TIVA.        Consents    Anesthesia Plan(s) and associated risks, benefits, and realistic alternatives discussed. Questions answered and patient/representative(s) expressed understanding.     - Discussed: Risks, Benefits and Alternatives for BOTH SEDATION and the PROCEDURE were discussed     - Discussed with:  Patient            Postoperative Care    Pain management: IV analgesics, Oral pain medications, Multi-modal analgesia.   PONV prophylaxis: Ondansetron (or other 5HT-3), Dexamethasone or Solumedrol     Comments:    Other Comments: Patient aware of plan, what to expect, and potential risks. Timeout was performed before bringing the patient back to OR, and once again, patient was asked if they had any questions           Taras Delarosa CRNA, APRN CRNA    I have  reviewed the pertinent notes and labs in the chart from the past 30 days and (re)examined the patient.  Any updates or changes from those notes are reflected in this note.

## 2023-12-14 ENCOUNTER — ANESTHESIA (OUTPATIENT)
Dept: GASTROENTEROLOGY | Facility: CLINIC | Age: 58
End: 2023-12-14
Payer: COMMERCIAL

## 2023-12-14 ENCOUNTER — HOSPITAL ENCOUNTER (OUTPATIENT)
Facility: CLINIC | Age: 58
Discharge: HOME | End: 2023-12-14
Attending: STUDENT IN AN ORGANIZED HEALTH CARE EDUCATION/TRAINING PROGRAM | Admitting: STUDENT IN AN ORGANIZED HEALTH CARE EDUCATION/TRAINING PROGRAM
Payer: COMMERCIAL

## 2023-12-14 VITALS
HEIGHT: 64 IN | OXYGEN SATURATION: 95 % | RESPIRATION RATE: 16 BRPM | SYSTOLIC BLOOD PRESSURE: 125 MMHG | TEMPERATURE: 98.4 F | HEART RATE: 48 BPM | DIASTOLIC BLOOD PRESSURE: 77 MMHG | WEIGHT: 175 LBS | BODY MASS INDEX: 29.88 KG/M2

## 2023-12-14 LAB — COLONOSCOPY: NORMAL

## 2023-12-14 PROCEDURE — 45378 DIAGNOSTIC COLONOSCOPY: CPT | Performed by: STUDENT IN AN ORGANIZED HEALTH CARE EDUCATION/TRAINING PROGRAM

## 2023-12-14 PROCEDURE — 250N000011 HC RX IP 250 OP 636

## 2023-12-14 PROCEDURE — 258N000003 HC RX IP 258 OP 636: Performed by: STUDENT IN AN ORGANIZED HEALTH CARE EDUCATION/TRAINING PROGRAM

## 2023-12-14 PROCEDURE — G0121 COLON CA SCRN NOT HI RSK IND: HCPCS | Performed by: STUDENT IN AN ORGANIZED HEALTH CARE EDUCATION/TRAINING PROGRAM

## 2023-12-14 PROCEDURE — 370N000017 HC ANESTHESIA TECHNICAL FEE, PER MIN: Performed by: STUDENT IN AN ORGANIZED HEALTH CARE EDUCATION/TRAINING PROGRAM

## 2023-12-14 RX ORDER — NALOXONE HYDROCHLORIDE 0.4 MG/ML
0.4 INJECTION, SOLUTION INTRAMUSCULAR; INTRAVENOUS; SUBCUTANEOUS
Status: DISCONTINUED | OUTPATIENT
Start: 2023-12-14 | End: 2023-12-14 | Stop reason: HOSPADM

## 2023-12-14 RX ORDER — PROPOFOL 10 MG/ML
INJECTION, EMULSION INTRAVENOUS PRN
Status: DISCONTINUED | OUTPATIENT
Start: 2023-12-14 | End: 2023-12-14

## 2023-12-14 RX ORDER — NALOXONE HYDROCHLORIDE 0.4 MG/ML
0.2 INJECTION, SOLUTION INTRAMUSCULAR; INTRAVENOUS; SUBCUTANEOUS
Status: DISCONTINUED | OUTPATIENT
Start: 2023-12-14 | End: 2023-12-14 | Stop reason: HOSPADM

## 2023-12-14 RX ORDER — ONDANSETRON 2 MG/ML
4 INJECTION INTRAMUSCULAR; INTRAVENOUS EVERY 30 MIN PRN
Status: DISCONTINUED | OUTPATIENT
Start: 2023-12-14 | End: 2023-12-14 | Stop reason: HOSPADM

## 2023-12-14 RX ORDER — FLUMAZENIL 0.1 MG/ML
0.2 INJECTION, SOLUTION INTRAVENOUS
Status: DISCONTINUED | OUTPATIENT
Start: 2023-12-14 | End: 2023-12-14 | Stop reason: HOSPADM

## 2023-12-14 RX ORDER — LIDOCAINE 40 MG/G
CREAM TOPICAL
Status: DISCONTINUED | OUTPATIENT
Start: 2023-12-14 | End: 2023-12-14 | Stop reason: HOSPADM

## 2023-12-14 RX ORDER — ONDANSETRON 4 MG/1
4 TABLET, ORALLY DISINTEGRATING ORAL EVERY 30 MIN PRN
Status: DISCONTINUED | OUTPATIENT
Start: 2023-12-14 | End: 2023-12-14 | Stop reason: HOSPADM

## 2023-12-14 RX ORDER — SODIUM CHLORIDE, SODIUM LACTATE, POTASSIUM CHLORIDE, CALCIUM CHLORIDE 600; 310; 30; 20 MG/100ML; MG/100ML; MG/100ML; MG/100ML
INJECTION, SOLUTION INTRAVENOUS CONTINUOUS
Status: DISCONTINUED | OUTPATIENT
Start: 2023-12-14 | End: 2023-12-14 | Stop reason: HOSPADM

## 2023-12-14 RX ADMIN — PROPOFOL 200 MCG/KG/MIN: 10 INJECTION, EMULSION INTRAVENOUS at 07:31

## 2023-12-14 RX ADMIN — PROPOFOL 100 MG: 10 INJECTION, EMULSION INTRAVENOUS at 07:29

## 2023-12-14 RX ADMIN — SODIUM CHLORIDE, POTASSIUM CHLORIDE, SODIUM LACTATE AND CALCIUM CHLORIDE: 600; 310; 30; 20 INJECTION, SOLUTION INTRAVENOUS at 07:28

## 2023-12-14 ASSESSMENT — ACTIVITIES OF DAILY LIVING (ADL): ADLS_ACUITY_SCORE: 35

## 2023-12-14 NOTE — H&P
Formerly [facility]    Pre-Endoscopy History and Physical     Jena Kamara MRN# [id number]   YOB: 1965 Age: 58 year old     Date of Procedure: 2023  Primary care provider: Monik Fraga  Type of Endoscopy: Colonoscopy with possible biopsy, possible polypectomy  Reason for Procedure: Screening  Type of Anesthesia Anticipated: Conscious Sedation    HPI:    Jena is a 58 year old female who will be undergoing the above procedure.      A history and physical has been performed. The patient's medications and allergies have been reviewed. The risks and benefits of the procedure and the sedation options and risks were discussed with the patient.  All questions were answered and informed consent was obtained.      She denies a personal or family history of anesthesia complications or bleeding disorders.     Colonoscopy, first time. Screening. No AC. ASA II for HTN. No fam hx of colon cancer.     Patient Active Problem List   Diagnosis    Angioedema    Allergic rhinitis    ROSACEA    Stress fracture of tibia or fibula    Hypertension goal BP (blood pressure) < 140/90    Hyperlipidemia LDL goal <130    HCH    Hypokalemia    Menopausal syndrome (hot flashes)        Past Medical History:   Diagnosis Date    Abnormal Pap smear of cervix ?    level of abnormality??    Optic atrophy, unspecified     Optical Atrophy        Past Surgical History:   Procedure Laterality Date    SURGICAL HISTORY OF -   1985    Sinus Surgery    SURGICAL HISTORY OF -   1993           Social History     Tobacco Use    Smoking status: Never    Smokeless tobacco: Never   Substance Use Topics    Alcohol use: Yes     Comment: 1 drink per month       Family History   Problem Relation Age of Onset    Cerebrovascular Disease Mother         age 49    Hypertension Mother     Lipids Father     Respiratory Father         COPD    Eye Disorder Father     Alzheimer Disease Father         age 76    Eye Disorder  "Sister         optical atrophy    Depression Maternal Grandmother         suicide    Cerebrovascular Disease Maternal Grandfather     Respiratory Paternal Grandfather     Diabetes No family hx of     C.A.D. No family hx of     Breast Cancer No family hx of     Cancer - colorectal No family hx of     Prostate Cancer No family hx of        Prior to Admission medications    Medication Sig Start Date End Date Taking? Authorizing Provider   amLODIPine (NORVASC) 5 MG tablet Take 0.5 tablets (2.5 mg) by mouth daily 7/25/23  Yes Monik Fraga MD   Calcium 200 MG TABS Take by mouth daily   Yes Reported, Patient   fluticasone (FLONASE) 50 MCG/ACT nasal spray Spray 2 sprays into both nostrils daily. Prn   11/19/12  Yes Tarsha Lee, DO   MULTIPLE VITAMIN OR TABS 1 TABLET DAILY   Yes Reported, Patient   CLARITIN 10 MG OR TABS 1 TABLET DAILY PRN    Reported, Patient       Allergies   Allergen Reactions    Pcn [Penicillins]         REVIEW OF SYSTEMS:   5 point ROS negative except as noted above in HPI, including Gen., Resp., CV, GI &  system review.    PHYSICAL EXAM:   BP (!) 152/91 (BP Location: Left arm)   Pulse 79   Temp 98.4  F (36.9  C) (Oral)   Resp 16   Ht 1.635 m (5' 4.37\")   Wt 79.4 kg (175 lb)   LMP  (LMP Unknown)   SpO2 98%   BMI 29.69 kg/m   Estimated body mass index is 29.69 kg/m  as calculated from the following:    Height as of this encounter: 1.635 m (5' 4.37\").    Weight as of this encounter: 79.4 kg (175 lb).   Constitutional: Awake, alert, no acute distress.  Eyes: No scleral icterus.  Conjunctiva are without injection.  ENMT: Mucous membranes moist, dentition and gums are intact.   Neck: Soft, supple, trachea midline.    Endocrine: n/a   Lymphatic: There is no cervical, submandibularadenopathy.  Respiratory: normal efforts on room air  Cardiovascular: extremities warm and well perfused  Abdomen: Non-distended, non-tender,  No masses,  Musculoskeletal: Full range of motion in the upper and " lower extremities.    Skin: No skin rashes or lesions to inspection.  No petechia.    Neurologic: alerted and oriented 3x  Psychiatric: The patient's affect is not blunted and mood is appropriate.  DIAGNOSTICS:    Not indicated    IMPRESSION   ASA Class 2 - Mild systemic disease    PLAN:   Plan for Colonoscopy with possible biopsy, possible polypectomy. We discussed the risks, benefits and alternatives and the patient wished to proceed.  Patient is cleared for the above procedure.    The above has been forwarded to the consulting provider.    Andrea Rayo MD on 12/14/2023 at 7:06 AM  [facility]

## 2023-12-14 NOTE — ANESTHESIA CARE TRANSFER NOTE
Patient: Jena Kamara    Procedure: Procedure(s):  Colonoscopy       Diagnosis: Screening for colon cancer [Z12.11]  Diagnosis Additional Information: No value filed.    Anesthesia Type:   General     Note:    Oropharynx: oropharynx clear of all foreign objects  Level of Consciousness: drowsy      Independent Airway: airway patency satisfactory and stable  Dentition: dentition unchanged  Vital Signs Stable: post-procedure vital signs reviewed and stable  Report to RN Given: handoff report given  Patient transferred to: Phase II    Handoff Report: Identifed the Patient, Identified the Reponsible Provider, Reviewed the pertinent medical history, Discussed the surgical course, Reviewed Intra-OP anesthesia mangement and issues during anesthesia, Set expectations for post-procedure period and Allowed opportunity for questions and acknowledgement of understanding      Vitals:  Vitals Value Taken Time   /67 12/14/23 0810   Temp     Pulse 56 12/14/23 0810   Resp     SpO2 94 % 12/14/23 0814   Vitals shown include unfiled device data.    Electronically Signed By: SHEILA Garza CRNA  December 14, 2023  8:15 AM

## 2023-12-14 NOTE — ANESTHESIA POSTPROCEDURE EVALUATION
Patient: Jena Kamara    Procedure: Procedure(s):  Colonoscopy       Anesthesia Type:  General    Note:  Disposition: Outpatient   Postop Pain Control: Uneventful            Sign Out: Well controlled pain   PONV: No   Neuro/Psych: Uneventful            Sign Out: Acceptable/Baseline neuro status   Airway/Respiratory: Uneventful            Sign Out: Acceptable/Baseline resp. status   CV/Hemodynamics: Uneventful            Sign Out: Acceptable CV status; No obvious hypovolemia; No obvious fluid overload   Other NRE:    DID A NON-ROUTINE EVENT OCCUR?            Last vitals:  Vitals:    12/14/23 0629   BP: (!) 152/91   Pulse: 79   Resp: 16   Temp: 36.9  C (98.4  F)   SpO2: 98%       Electronically Signed By: SHEILA Garza CRNA  December 14, 2023  8:15 AM

## 2023-12-22 ENCOUNTER — OFFICE VISIT (OUTPATIENT)
Dept: SURGERY | Facility: CLINIC | Age: 58
End: 2023-12-22
Attending: FAMILY MEDICINE
Payer: COMMERCIAL

## 2023-12-22 VITALS — BODY MASS INDEX: 30.9 KG/M2 | WEIGHT: 181 LBS | HEIGHT: 64 IN

## 2023-12-22 DIAGNOSIS — N62 LARGE BREASTS: ICD-10-CM

## 2023-12-22 PROCEDURE — 99204 OFFICE O/P NEW MOD 45 MIN: CPT | Performed by: STUDENT IN AN ORGANIZED HEALTH CARE EDUCATION/TRAINING PROGRAM

## 2023-12-22 NOTE — LETTER
"    12/22/2023         RE: Jena Kamara  [address] [facility] [address]        Dear Colleague,    Thank you for referring your patient, Jena Kamara, to the [facility]. Please see a copy of my visit note below.    PRS    HPI: 58-year-old female presenting with bilateral symptomatic macromastia.  Patient has 36DDD cup breasts and would like to be smaller.  She notes that the right breast has some cysts and the right breast has been larger.  She notes that this makes it difficult to fit bra and because of the asymmetry one of the bra straps always slides off.  She has been weight stable.  She also wants less weight on her upper chest.  Patient suffers from upper back, neck and shoulder pain.  Patient has shoulder bra strap grooving.  Patient will occasionally have rashes in the inframammary area especially during warmer months and will have the under wiring of the bra rub if she tries to wear the bra tightly to accommodate for the heaviness of the breasts.  She has tried PT as conservative management for many sessions throughout her life, but the last time was several years ago.  She does state that the PT has helped with the symptoms.  She has also tried Vaseline topical ointment as needed for skin irritation with incomplete relief.  No palpable breast masses, lumps, nipple discharge or swelling in the armpit.  Patient is current on her screening mammography.     ROS: Negative, see HPI  Past medical history: Nondiabetic, hypertension  Past surgical history: No surgeries on the breasts  Medications: No blood thinners  Allergies: Penicillin  Family history: No bleeding or clotting problems or problems with anesthesia.  No breast cancer history in the family.  Social history: Non-smoker, denies any tobacco or nicotine use     Examination:  Ht 1.635 m (5' 4.37\")   Wt 82.1 kg (181 lb)   LMP  (LMP Unknown)   BMI 30.71 kg/m    Nonlabored breathing  Not distressed  Bilateral " grade 2 ptotic breasts  No breast masses, lumps, nipple discharge, axillary lymphadenopathy or skin changes  Breast measurements:  Sternal notch nipple distance: 30 cm on the left, 32 cm on the right  Nipple inframammary crease distance: 13 cm on the left, 14 centimeters on the right  Breast base width: 17 cm on the left, 7 cm on the right  Nipple midline distance: 12.5 cm on the left, 12.5 cm on the right  Areolar diameter: 5 cm on the left, 6 cm on the right     Screening mammogram 8/2023: BI-RADS Category 1, negative     Schnur: 1.5 BSA, 482 grams per side     A/P: 58-year-old female presenting with symptomatic bilateral macromastia in the setting of asymmetry     -Patient is a good candidate for bilateral breast reduction.  The plan would be for a inferior pedicle with inverted T skin resection.  The goals of breast reduction surgery include to alleviate the symptoms of macromastia, to make the breasts body appropriate and aesthetically appealing, to improve the symmetry, and to not cause problems like wound healing issues or hematoma.  -Discussed the risks of surgery, including but not limited to: infection, bleeding, hematoma, seroma, poor scarring, wound healing issues, pain, nipple sensitivity issues or decreased sensation, loss of nipple areola, need for free nipple grafting, asymmetry, need for revision surgery, suboptimal aesthetic result, DVT, PE, death.  Despite these risks, patient consents to and would like to proceed with possibly scheduling bilateral breast reduction.  -Photography today  -Case request to be placed  -A total of 45 minutes was devoted to review of chart, direct face-to-face patient counseling and documentation during this encounter, exclusive of any procedure performed.     Marcelo Farooq MD, PhD       Again, thank you for allowing me to participate in the care of your patient.        Sincerely,        Marcelo Farooq MD

## 2023-12-22 NOTE — NURSING NOTE
"Jena Kamara's chief complaint for this visit includes:  Chief Complaint   Patient presents with    New Patient     breast reduction consult // appt per patient // referred by Monik Fraga // no outside recs     PCP: Monik Fraga    Referring Provider:  Monik Fraga MD  [address] [place] [address]    Ht 1.635 m (5' 4.37\")   Wt 82.1 kg (181 lb)   LMP  (LMP Unknown)   BMI 30.71 kg/m    Data Unavailable        Allergies   Allergen Reactions    Pcn [Penicillins]          Do you need any medication refills at today's visit? No    Tarsha oviedo Clinic Assistant- Surgical Specialties         "

## 2023-12-22 NOTE — PROGRESS NOTES
"PRS    HPI: 58-year-old female presenting with bilateral symptomatic macromastia.  Patient has 36DDD cup breasts and would like to be smaller.  She notes that the right breast has some cysts and the right breast has been larger.  She notes that this makes it difficult to fit bra and because of the asymmetry one of the bra straps always slides off.  She has been weight stable.  She also wants less weight on her upper chest.  Patient suffers from upper back, neck and shoulder pain.  Patient has shoulder bra strap grooving.  Patient will occasionally have rashes in the inframammary area especially during warmer months and will have the under wiring of the bra rub if she tries to wear the bra tightly to accommodate for the heaviness of the breasts.  She has tried PT as conservative management for many sessions throughout her life, but the last time was several years ago.  She does state that the PT has helped with the symptoms.  She has also tried Vaseline topical ointment as needed for skin irritation with incomplete relief.  No palpable breast masses, lumps, nipple discharge or swelling in the armpit.  Patient is current on her screening mammography.     ROS: Negative, see HPI  Past medical history: Nondiabetic, hypertension  Past surgical history: No surgeries on the breasts  Medications: No blood thinners  Allergies: Penicillin  Family history: No bleeding or clotting problems or problems with anesthesia.  No breast cancer history in the family.  Social history: Non-smoker, denies any tobacco or nicotine use     Examination:  Ht 1.635 m (5' 4.37\")   Wt 82.1 kg (181 lb)   LMP  (LMP Unknown)   BMI 30.71 kg/m    Nonlabored breathing  Not distressed  Bilateral grade 2 ptotic breasts  No breast masses, lumps, nipple discharge, axillary lymphadenopathy or skin changes  Breast measurements:  Sternal notch nipple distance: 30 cm on the left, 32 cm on the right  Nipple inframammary crease distance: 13 cm on the left, 14 " centimeters on the right  Breast base width: 17 cm on the left, 7 cm on the right  Nipple midline distance: 12.5 cm on the left, 12.5 cm on the right  Areolar diameter: 5 cm on the left, 6 cm on the right     Screening mammogram 8/2023: BI-RADS Category 1, negative     Schnur: 1.5 BSA, 482 grams per side     A/P: 58-year-old female presenting with symptomatic bilateral macromastia in the setting of asymmetry     -Patient is a good candidate for bilateral breast reduction.  The plan would be for a inferior pedicle with inverted T skin resection.  The goals of breast reduction surgery include to alleviate the symptoms of macromastia, to make the breasts body appropriate and aesthetically appealing, to improve the symmetry, and to not cause problems like wound healing issues or hematoma.  -Discussed the risks of surgery, including but not limited to: infection, bleeding, hematoma, seroma, poor scarring, wound healing issues, pain, nipple sensitivity issues or decreased sensation, loss of nipple areola, need for free nipple grafting, asymmetry, need for revision surgery, suboptimal aesthetic result, DVT, PE, death.  Despite these risks, patient consents to and would like to proceed with possibly scheduling bilateral breast reduction.  -Photography today  -Case request to be placed  -A total of 45 minutes was devoted to review of chart, direct face-to-face patient counseling and documentation during this encounter, exclusive of any procedure performed.     Marcelo Farooq MD, PhD

## 2023-12-28 ENCOUNTER — TELEPHONE (OUTPATIENT)
Dept: PLASTIC SURGERY | Facility: CLINIC | Age: 58
End: 2023-12-28
Payer: COMMERCIAL

## 2023-12-28 ENCOUNTER — HOSPITAL ENCOUNTER (OUTPATIENT)
Facility: AMBULATORY SURGERY CENTER | Age: 58
End: 2023-12-28
Attending: STUDENT IN AN ORGANIZED HEALTH CARE EDUCATION/TRAINING PROGRAM | Admitting: STUDENT IN AN ORGANIZED HEALTH CARE EDUCATION/TRAINING PROGRAM
Payer: COMMERCIAL

## 2023-12-28 PROBLEM — N62 LARGE BREASTS: Status: ACTIVE | Noted: 2023-12-22

## 2023-12-28 NOTE — TELEPHONE ENCOUNTER
Left message regarding scheduling surgery/procedure with Dr. Farooq. Writer left call back number on the patients voicemail.    Viri Mazariegos on 12/28/2023 at 2:54 PM   P: [phone]

## 2023-12-28 NOTE — TELEPHONE ENCOUNTER
RN Care Coordinator: Onelia Marte    Surgery is scheduled with Dr. Farooq  Date: 6/6   Location: [facility]      H&P to be completed by: Primary Care team - patient instructed to schedule.     Post-op visit: 6/21 with Dr. Farooq [facility]      Patient will receive a phone call from pre-admission nurses 3-5 days prior to surgery with arrival time and NPO instructions.         Spoke with the patient and was able to confirm all scheduled information.       Patient questions/concerns: Questions about recovery & restrictions. Will have RNCC reach out to patient to discuss.       Surgery packet: to be sent via [facility]    __    Viri Newsome, Senior Perioperative Coordinator, on 12/28/2023 at 4:25 PM  P: [phone]

## 2024-02-15 ENCOUNTER — OFFICE VISIT (OUTPATIENT)
Dept: DERMATOLOGY | Facility: CLINIC | Age: 59
End: 2024-02-15
Attending: FAMILY MEDICINE
Payer: COMMERCIAL

## 2024-02-15 DIAGNOSIS — L81.4 LENTIGO: ICD-10-CM

## 2024-02-15 DIAGNOSIS — L57.0 ACTINIC KERATOSIS: Primary | ICD-10-CM

## 2024-02-15 DIAGNOSIS — D48.5 NEOPLASM OF UNCERTAIN BEHAVIOR OF SKIN: ICD-10-CM

## 2024-02-15 DIAGNOSIS — L82.1 SEBORRHEIC KERATOSIS: ICD-10-CM

## 2024-02-15 DIAGNOSIS — I78.1 TELANGIECTASIA: ICD-10-CM

## 2024-02-15 DIAGNOSIS — D22.9 NEVUS: ICD-10-CM

## 2024-02-15 PROCEDURE — 17000 DESTRUCT PREMALG LESION: CPT | Mod: 59 | Performed by: PHYSICIAN ASSISTANT

## 2024-02-15 PROCEDURE — 11102 TANGNTL BX SKIN SINGLE LES: CPT | Performed by: PHYSICIAN ASSISTANT

## 2024-02-15 PROCEDURE — 88305 TISSUE EXAM BY PATHOLOGIST: CPT | Performed by: DERMATOLOGY

## 2024-02-15 PROCEDURE — 11103 TANGNTL BX SKIN EA SEP/ADDL: CPT | Performed by: PHYSICIAN ASSISTANT

## 2024-02-15 PROCEDURE — 99203 OFFICE O/P NEW LOW 30 MIN: CPT | Mod: 25 | Performed by: PHYSICIAN ASSISTANT

## 2024-02-15 PROCEDURE — 17003 DESTRUCT PREMALG LES 2-14: CPT | Performed by: PHYSICIAN ASSISTANT

## 2024-02-15 NOTE — PROGRESS NOTES
HPI:   Chief complaints: Jena Kamara is a pleasant 58 year old female who presents for evaluation of several spots of concern. She has has a spot on the right side of her nose which has been present for months but does not heal. She also has noticed a scaly spot on the nose. She has an irregular spot on her back she would like checked and has an irritated mole on the lower back.     No history of skin cancer    Parents with likely NMSC: grandmother had melanoma      PHYSICAL EXAM:    LMP  (LMP Unknown)   Skin exam performed as follows: Type 2 skin. Mood appropriate  Alert and Oriented X 3. Well developed, well nourished in no distress.  General appearance: Normal  Head including face: Normal  Eyes: conjunctiva and lids: Normal  Mouth: Lips, teeth, gums: Normal  Neck: Normal  Skin: Scalp and body hair: See below    3 mm pink papule on the right nasal side wall  Pink gritty papule on the right supra brow x 1, right upper cheek x 1, mid nasal bridge x 1, left upper cheek x 1  4 mm pink fleshy papule on the mid lower back  Brown and tan macules and papules, keratotic papules on the back    ASSESSMENT/PLAN:     R/o BCC on the right nsw. Shave biopsy performed.  Area cleaned and anesthetized with 1% lidocaine with epinephrine.  Dermablade used to remove the lesion and sent to pathology. Bleeding was cauterized. Pt tolerated procedure well with no complications.   Actinic keratosis on he right supra brow x 1, right upper cheek x 1, mid nasal bridge x 1, left upper cheek x 1. As precancerous, cryosurgery performed. Advised on blistering and post-op care. Advised if not resolved in 1-2 months to return for evaluation  Dermal nevus r/o atypicality on the mid lower back. Shave biopsy performed.  Area cleaned and anesthetized with 1% lidocaine with epinephrine.  Dermablade used to remove the lesion and sent to pathology. Bleeding was cauterized. Pt tolerated procedure well with no complications.   Nevi, lentigos, SKs on the  back - advised benign   Discussed IPL for facial telangiectasias on bilateral cheeks and nose          Follow-up: pending path  CC:   Scribed By: Lisa Murrell, MS, PA-C

## 2024-02-15 NOTE — PATIENT INSTRUCTIONS
Wound Care Instructions     FOR SUPERFICIAL WOUNDS     [facility]  [phone]                 AFTER 24 HOURS YOU SHOULD REMOVE THE BANDAGE AND BEGIN DAILY DRESSING CHANGES AS FOLLOWS:     1) Remove Dressing.     2) Clean and dry the area with tap water using a Q-tip or sterile gauze pad.     3) Apply Vaseline, Aquaphor, Polysporin ointment or Bacitracin ointment over entire wound.  Do NOT use Neosporin ointment.     4) Cover the wound with a band-aid, or a sterile non-stick gauze pad and micropore paper tape    REPEAT THESE INSTRUCTIONS AT LEAST ONCE A DAY UNTIL THE WOUND HAS COMPLETELY HEALED.    It is an old wives tale that a wound heals better when it is exposed to air and allowed to dry out. The wound will heal faster with a better cosmetic result if it is kept moist with ointment and covered with a bandage.    **Do not let the wound dry out.**    Supplies Needed:      *Cotton tipped applicators (Q-tips)    *Vaseline, Aquaphor, Polysporin or Bacitracin Ointment (NOT NEOSPORIN)    *Band-aids or non-stick gauze pads and micropore paper tape.      PATIENT INFORMATION:    During the healing process you will notice a number of changes. All wounds develop a small halo of redness surrounding the wound.  This means healing is occurring. Severe itching with extensive redness usually indicates sensitivity to the ointment or bandage tape used to dress the wound.  You should call our office if this develops.      Swelling  and/or discoloration around your surgical site is common, particularly when performed around the eye.    All wounds normally drain.  The larger the wound the more drainage there will be.  After 7-10 days, you will notice the wound beginning to shrink and new skin will begin to grow.  The wound is healed when you can see skin has formed over the entire area.  A healed wound has a healthy, shiny look to the surface and is red to dark pink in color to normalize.  Wounds may  take approximately 4-6 weeks to heal.  Larger wounds may take 6-8 weeks.  After the wound is healed you may discontinue dressing changes.    You may experience a sensation of tightness as your wound heals. This is normal and will gradually subside.    Your healed wound may be sensitive to temperature changes. This sensitivity improves with time, but if you re having a lot of discomfort, try to avoid temperature extremes.    Patients frequently experience itching after their wound appears to have healed because of the continue healing under the skin.  Plain Vaseline will help relieve the itching.      POSSIBLE COMPLICATIONS    BLEEDING:    Leave the bandage in place.  Use tightly rolled up gauze or a cloth to apply direct pressure over the bandage for 30  minutes.  Reapply pressure for an additional 30 minutes if necessary  Use additional gauze and tape to maintain pressure once the bleeding has stopped.

## 2024-02-15 NOTE — LETTER
2/15/2024         RE: Jena Kamara  [address] [place] [facility] [address]        Dear Colleague,    Thank you for referring your patient, Jena Kamara, to the [facility]. Please see a copy of my visit note below.    HPI:   Chief complaints: Jena Kamara is a pleasant 58 year old female who presents for evaluation of several spots of concern. She has has a spot on the right side of her nose which has been present for months but does not heal. She also has noticed a scaly spot on the nose. She has an irregular spot on her back she would like checked and has an irritated mole on the lower back.     No history of skin cancer    Parents with likely NMSC: grandmother had melanoma      PHYSICAL EXAM:    LMP  (LMP Unknown)   Skin exam performed as follows: Type 2 skin. Mood appropriate  Alert and Oriented X 3. Well developed, well nourished in no distress.  General appearance: Normal  Head including face: Normal  Eyes: conjunctiva and lids: Normal  Mouth: Lips, teeth, gums: Normal  Neck: Normal  Skin: Scalp and body hair: See below    3 mm pink papule on the right nasal side wall  Pink gritty papule on the right supra brow x 1, right upper cheek x 1, mid nasal bridge x 1, left upper cheek x 1  4 mm pink fleshy papule on the mid lower back  Brown and tan macules and papules, keratotic papules on the back    ASSESSMENT/PLAN:     R/o BCC on the right nsw. Shave biopsy performed.  Area cleaned and anesthetized with 1% lidocaine with epinephrine.  Dermablade used to remove the lesion and sent to pathology. Bleeding was cauterized. Pt tolerated procedure well with no complications.   Actinic keratosis on he right supra brow x 1, right upper cheek x 1, mid nasal bridge x 1, left upper cheek x 1. As precancerous, cryosurgery performed. Advised on blistering and post-op care. Advised if not resolved in 1-2 months to return for evaluation  Dermal nevus r/o atypicality on the mid lower back.  Shave biopsy performed.  Area cleaned and anesthetized with 1% lidocaine with epinephrine.  Dermablade used to remove the lesion and sent to pathology. Bleeding was cauterized. Pt tolerated procedure well with no complications.   Nevi, lentigos, SKs on the back - advised benign   Discussed IPL for facial telangiectasias on bilateral cheeks and nose          Follow-up: pending path  CC:   Scribed By: Lisa Murrell MS, STEVE      Again, thank you for allowing me to participate in the care of your patient.        Sincerely,        Lisa Murrell PA-C

## 2024-02-19 ENCOUNTER — TELEPHONE (OUTPATIENT)
Dept: DERMATOLOGY | Facility: CLINIC | Age: 59
End: 2024-02-19
Payer: COMMERCIAL

## 2024-02-19 DIAGNOSIS — D48.5 NEOPLASM OF UNCERTAIN BEHAVIOR OF SKIN: Primary | ICD-10-CM

## 2024-02-19 LAB
PATH REPORT.COMMENTS IMP SPEC: ABNORMAL
PATH REPORT.COMMENTS IMP SPEC: ABNORMAL
PATH REPORT.COMMENTS IMP SPEC: YES
PATH REPORT.FINAL DX SPEC: ABNORMAL
PATH REPORT.GROSS SPEC: ABNORMAL
PATH REPORT.MICROSCOPIC SPEC OTHER STN: ABNORMAL
PATH REPORT.RELEVANT HX SPEC: ABNORMAL

## 2024-02-19 NOTE — LETTER
February 19, 2024    Jena Kamara  [address] [address]      Dear Jena      You are scheduled for Mohs Surgery on 2/27/24 at 7:30 am.     Please check in at 2nd floor Dermatology Clinic.     Be sure to eat a good breakfast and bathe and wash your hair prior to Surgery. Please bring  with you if this is above your neck    If you are taking any anti-coagulants that are prescribed by your Doctor (such as Coumadin/warfarin, Plavix, Aspirin, Ibuprofen), please continue taking them.     However, If you are taking anti-coagulants over the counter without  a Doctor's order for a Medical condition, please discontinue them 10 days prior to Surgery.      Please wear loose comfortable clothing as it could possibly be 4-6 hours until your surgery is completed depending upon how many layers of tissue need to be removed.     Thank you,    Prince Garduno MD/Linda Eller LPN

## 2024-02-19 NOTE — TELEPHONE ENCOUNTER
Patient Contact    Attempt # 1    Was call answered?  Yes    Called patient. Results were given. All questions were answered. Patient was scheduled with Dr. Garduno. Information was sent in the mail as well.   Derm Surg referral was placed and linked.     Linda Eller LPN   [facility]   [phone]

## 2024-02-19 NOTE — TELEPHONE ENCOUNTER
----- Message from Lisa Murrell PA-C sent at 2/19/2024  1:37 PM CST -----  Right nasal side wall BCC please schedule excision   Mid lower back benign neurofibroma no further treatment

## 2024-02-26 NOTE — PROGRESS NOTES
Surgical Office Location :   [facility]  [address] [place] [address]

## 2024-02-27 ENCOUNTER — OFFICE VISIT (OUTPATIENT)
Dept: DERMATOLOGY | Facility: CLINIC | Age: 59
End: 2024-02-27
Payer: COMMERCIAL

## 2024-02-27 DIAGNOSIS — C44.311 BASAL CELL CARCINOMA (BCC) OF RIGHT SIDE OF NOSE: Primary | ICD-10-CM

## 2024-02-27 PROCEDURE — 17311 MOHS 1 STAGE H/N/HF/G: CPT | Performed by: DERMATOLOGY

## 2024-02-27 PROCEDURE — 17312 MOHS ADDL STAGE: CPT | Performed by: DERMATOLOGY

## 2024-02-27 PROCEDURE — 13152 CMPLX RPR E/N/E/L 2.6-7.5 CM: CPT | Performed by: DERMATOLOGY

## 2024-02-27 NOTE — PROGRESS NOTES
Jena Kamara is an extremely pleasant 58 year old year old female patient here today for evaluation and managment of basal cell carcinoma on right nsw.  Patient has no other skin complaints today.  Remainder of the HPI, Meds, PMH, Allergies, FH, and SH was reviewed in chart.      Past Medical History:   Diagnosis Date    Abnormal Pap smear of cervix ?    level of abnormality??    Basal cell carcinoma     Optic atrophy, unspecified     Optical Atrophy       Past Surgical History:   Procedure Laterality Date    COLONOSCOPY N/A 2023    Procedure: Colonoscopy;  Surgeon: Andrea Rayo MD;  Location: [facility]    SURGICAL HISTORY OF -   1985    Sinus Surgery    SURGICAL HISTORY OF -   1993            Family History   Problem Relation Age of Onset    Cerebrovascular Disease Mother         age 49    Hypertension Mother     Lipids Father     Respiratory Father         COPD    Eye Disorder Father     Alzheimer Disease Father         age 76    Eye Disorder Sister         optical atrophy    Depression Maternal Grandmother         suicide    Cerebrovascular Disease Maternal Grandfather     Respiratory Paternal Grandfather     Diabetes No family hx of     C.A.D. No family hx of     Breast Cancer No family hx of     Cancer - colorectal No family hx of     Prostate Cancer No family hx of        Social History     Socioeconomic History    Marital status:      Spouse name: Not on file    Number of children: Not on file    Years of education: Not on file    Highest education level: Not on file   Occupational History    Not on file   Tobacco Use    Smoking status: Never    Smokeless tobacco: Never   Vaping Use    Vaping Use: Never used   Substance and Sexual Activity    Alcohol use: Yes     Comment: 1 drink per month    Drug use: No    Sexual activity: Yes     Partners: Male     Birth control/protection: Surgical     Comment: vasectomy   Other Topics Concern    Parent/sibling w/ CABG, MI or  angioplasty before 65F 55M? No   Social History Narrative    Not on file     Social Determinants of Health     Financial Resource Strain: Not on file   Food Insecurity: Not on file   Transportation Needs: Not on file   Physical Activity: Not on file   Stress: Not on file   Social Connections: Not on file   Interpersonal Safety: Not on file   Housing Stability: Not on file       Outpatient Encounter Medications as of 2/27/2024   Medication Sig Dispense Refill    amLODIPine (NORVASC) 5 MG tablet Take 0.5 tablets (2.5 mg) by mouth daily 45 tablet 3    Calcium 200 MG TABS Take by mouth daily      CLARITIN 10 MG OR TABS 1 TABLET DAILY PRN      fluticasone (FLONASE) 50 MCG/ACT nasal spray Spray 2 sprays into both nostrils daily. Prn   1 Package 11    MULTIPLE VITAMIN OR TABS 1 TABLET DAILY       No facility-administered encounter medications on file as of 2/27/2024.             O:   NAD, WDWN, Alert & Oriented, Mood & Affect wnl, Vitals stable   General appearance normal   Vitals stable   Alert, oriented and in no acute distress     R NSW 3mm scaly papule       Eyes: Conjunctivae/lids:Normal     ENT: Lips, buccal mucosa, tongue: normal    MSK:Normal    Cardiovascular: peripheral edema none    Pulm: Breathing Normal    Neuro/Psych: Orientation:Alert and Orientedx3 ; Mood/Affect:normal       A/P:  R NSW basal cell carcinoma   MOHS:   Location    The rationale for Mohs surgery was discussed with the patient and consent was obtained.  The risks and benefits as well as alternatives to therapy were discussed, in detail.  Specifically, the risks of infection, scarring, bleeding, prolonged wound healing, incomplete removal, allergy to anesthesia, nerve injury and recurrence were addressed.  Indication for Mohs was Location. Prior to the procedure, the treatment site was clearly identified and, if available, confirmed with previous photos and confirmed by the patient   All components of the Universal Protocol/PAUSE rule were  completed.  The Mohs surgeon operated in two distinct and integrated capacities as the surgeon and pathologist.      The area was prepped with Betasept.  A rim of normal appearing skin was marked circumferentially around the lesion.  The area was infiltrated with local anesthesia.  The tumor was first debulked to remove all clinically apparent tumor.  An incision following the standard Mohs approach was done and the specimen was oriented,mapped and placed in 2 block(s).  Each specimen was then chromacoded and processed in the Mohs laboratory using standard Mohs technique and submitted for frozen section histology.  Frozen section analysis showed  residual tumor but CLEAR MARGINS.    1st stage:Orthokeratosis of epidermis with a proliferation of nests of basaloid cells, with peripheral palisading and a haphazard arrangement in the center extending into the dermis, forming nodules.  The tumor cells have hyperchromatic nuclei. Poor cytoplasm and intercellular bridging.      The tumor was excised using standard Mohs technique in 2 stages(s).  CLEAR MARGINS OBTAINED and Final defect size was 1.1 x 0.7 cm.     We discussed the options for wound management in full with the patient including risks/benefits/ possible outcomes.    REPAIR COMPLEX: Because of the tightness of the surrounding skin and Because of the size and full thickness nature of the defect, Because of the tightness of the surrounding skin, To maintain form and function, In order to avoid distortion, and Because of the proximity to the lid, a complex closure was planned. After LE anesthesia and prep, Burow's triangles were excised in the relaxed skin tension lines. The wound edges were widely undermined greater than width of the defect on both sides by dissection in the subcutaneous plane until adequate tissue mobility was obtained. Hemostasis was obtained. The wound edges were closed in a layered fashion using Vicryl and Fast Absorbing Plain Gut sutures.  Postoperative length was 2.7 cm.   EBL minimal; complications none; wound care routine.  The patient was discharged in good condition and will return in one week for wound evaluation.    It was a pleasure speaking to Jena Kamara today.  Previous clinic notes and pertinent laboratory tests were reviewed prior to Jena Kamara's visit.  Signs and Symptoms of skin cancer discussed with patient.  Patient encouraged to perform monthly skin exams.  UV precautions reviewed with patient.  Risks of non-melanoma skin cancer discussed with patient   Return to clinic 6 months

## 2024-02-27 NOTE — PATIENT INSTRUCTIONS
Sutured Wound Care     [facility]: [phone]    [facility]: [phone]          No strenuous activity for 48 hours. Resume moderate activity in 48 hours. No heavy exercising until you are seen for follow up in one week.     Take Tylenol as needed for discomfort.                         Do not drink alcoholic beverages for 48 hours.     Keep the pressure bandage in place for 24 hours. If the bandage becomes blood tinged or loose, reinforce it with gauze and tape.        (Refer to the reverse side of this page for management of bleeding).    Remove pressure bandage in 24 hours     Leave the flat bandage in place for one week.    Keep the bandage dry. Wash around it carefully.    If the tape becomes soiled or starts to come off, reinforce it with additional paper tape.    Do not smoke for 3 weeks; smoking is detrimental to wound healing.    It is normal to have swelling and bruising around the surgical site. The bruising will fade in approximately 10-14 days. Elevate the area to reduce swelling.    Numbness, itchiness and sensitivity to temperature changes can occur after surgery and may take up to 18 months to normalize.      POSSIBLE COMPLICATIONS    BLEEDING:    Leave the bandage in place.  Use tightly rolled up gauze or a cloth to apply direct pressure over the bandage for 20   minutes.  Reapply pressure for an additional 20 minutes if necessary  Call the office or go to the nearest emergency room if pressure fails to stop the bleeding.  Use additional gauze and tape to maintain pressure once the bleeding has stopped.    PAIN:    Post operative pain should slowly get better, never worse.  A severe increase in pain may indicate a problem. Call the office if this occurs.    In case of emergency phone:Dr Garduno [phone]         ONE WEEK AFTER SURGERY (3/5):  -Remove bandage  -Clean and dry the area with plain tap water using a Q-tip or sterile gauze pad  -Reapply steri strips down  incision and cover with paper tape  -Leave bandage in place for 1  week  -Remove bandage on  3/12/24       when you remove the bandage, you may resume your regular skin care routine, including washing with mild soap and water, applying moisturizer, make-up and sunscreen.    If there are any open or bleeding areas at the incision/graft site you should begin to cover the area with a bandage daily as follows:    Clean and dry the area with plain tap water using a Q-tip or sterile gauze pad.  Apply Polysporin or Bacitracin ointment to the open area.  Cover the wound with a band-aid or a sterile non-stick gauze pad and micropore paper tape.         SIGNS OF INFECTION  - If you notice any of these signs of infection, call your doctor right away: expanding redness around the wound.  - Yellow or greenish-colored pus or cloudy wound drainage.    - Red streaking spreading from the wound.  - Increased swelling, tenderness, or pain around the wound.   - Fever.    Please remember that yellow and clear drainage from a wound can be normal and related to normal wound healing.  Isolated drainage from a wound without a combination of the above features does not indicate infection.       *Once the bandages are removed, the scar will be red and firm (especially in the lip/chin area). This is normal and will fade in time. It might take 6-12 months for this to happen.     *Massaging the area will help the scar soften and fade quicker. Begin to massage the area one month after the bandages have been removed. To massage apply pressure directly and firmly over the scar with the fingertips and move in a circular motion. Massage the area for a few minutes several times a day. Continue to massage the site for several months.    *Approximately 6-8 weeks after surgery it is not uncommon to see the formation of  tender pimple-like  bump along the scar. This is normal. As the scar continues to mature and the stitches underneath the skin begin to  dissolve, this might occur. Do not pick or squeeze, this will resolve on it s own. Should one break open producing a small amount of drainage, apply Polysporin or Bacitracin ointment a few times a day until the wound is completely healed.    *Numbness in the surgical area is expected. It might take 12-18 months for the feeling to return to normal. During this time sensations of itchiness, tingling and occasional sharp pains might be noted. These feelings are normal and will subside once the nerves have completely healed.

## 2024-02-27 NOTE — LETTER
2024         RE: Jena Kamara  [address] [facility] [address]        Dear Colleague,    Thank you for referring your patient, Jena Kamara, to the [facility]. Please see a copy of my visit note below.    Surgical Office Location :   [facility]  [address] [place] [address]      Jena Kamara is an extremely pleasant 58 year old year old female patient here today for evaluation and managment of basal cell carcinoma on right nsw.  Patient has no other skin complaints today.  Remainder of the HPI, Meds, PMH, Allergies, FH, and SH was reviewed in chart.      Past Medical History:   Diagnosis Date     Abnormal Pap smear of cervix ?    level of abnormality??     Basal cell carcinoma      Optic atrophy, unspecified     Optical Atrophy       Past Surgical History:   Procedure Laterality Date     COLONOSCOPY N/A 2023    Procedure: Colonoscopy;  Surgeon: Andrea Rayo MD;  Location: [facility]     SURGICAL HISTORY OF -   1985    Sinus Surgery     SURGICAL HISTORY OF -   1993            Family History   Problem Relation Age of Onset     Cerebrovascular Disease Mother         age 49     Hypertension Mother      Lipids Father      Respiratory Father         COPD     Eye Disorder Father      Alzheimer Disease Father         age 76     Eye Disorder Sister         optical atrophy     Depression Maternal Grandmother         suicide     Cerebrovascular Disease Maternal Grandfather      Respiratory Paternal Grandfather      Diabetes No family hx of      C.A.D. No family hx of      Breast Cancer No family hx of      Cancer - colorectal No family hx of      Prostate Cancer No family hx of        Social History     Socioeconomic History     Marital status:      Spouse name: Not on file     Number of children: Not on file     Years of education: Not on file     Highest education level: Not on file   Occupational History     Not on  file   Tobacco Use     Smoking status: Never     Smokeless tobacco: Never   Vaping Use     Vaping Use: Never used   Substance and Sexual Activity     Alcohol use: Yes     Comment: 1 drink per month     Drug use: No     Sexual activity: Yes     Partners: Male     Birth control/protection: Surgical     Comment: vasectomy   Other Topics Concern     Parent/sibling w/ CABG, MI or angioplasty before 65F 55M? No   Social History Narrative     Not on file     Social Determinants of Health     Financial Resource Strain: Not on file   Food Insecurity: Not on file   Transportation Needs: Not on file   Physical Activity: Not on file   Stress: Not on file   Social Connections: Not on file   Interpersonal Safety: Not on file   Housing Stability: Not on file       Outpatient Encounter Medications as of 2/27/2024   Medication Sig Dispense Refill     amLODIPine (NORVASC) 5 MG tablet Take 0.5 tablets (2.5 mg) by mouth daily 45 tablet 3     Calcium 200 MG TABS Take by mouth daily       CLARITIN 10 MG OR TABS 1 TABLET DAILY PRN       fluticasone (FLONASE) 50 MCG/ACT nasal spray Spray 2 sprays into both nostrils daily. Prn   1 Package 11     MULTIPLE VITAMIN OR TABS 1 TABLET DAILY       No facility-administered encounter medications on file as of 2/27/2024.             O:   NAD, WDWN, Alert & Oriented, Mood & Affect wnl, Vitals stable   General appearance normal   Vitals stable   Alert, oriented and in no acute distress     R NSW 3mm scaly papule       Eyes: Conjunctivae/lids:Normal     ENT: Lips, buccal mucosa, tongue: normal    MSK:Normal    Cardiovascular: peripheral edema none    Pulm: Breathing Normal    Neuro/Psych: Orientation:Alert and Orientedx3 ; Mood/Affect:normal       A/P:  R NSW basal cell carcinoma   MOHS:   Location    The rationale for Mohs surgery was discussed with the patient and consent was obtained.  The risks and benefits as well as alternatives to therapy were discussed, in detail.  Specifically, the risks of  infection, scarring, bleeding, prolonged wound healing, incomplete removal, allergy to anesthesia, nerve injury and recurrence were addressed.  Indication for Mohs was Location. Prior to the procedure, the treatment site was clearly identified and, if available, confirmed with previous photos and confirmed by the patient   All components of the Universal Protocol/PAUSE rule were completed.  The Mohs surgeon operated in two distinct and integrated capacities as the surgeon and pathologist.      The area was prepped with Betasept.  A rim of normal appearing skin was marked circumferentially around the lesion.  The area was infiltrated with local anesthesia.  The tumor was first debulked to remove all clinically apparent tumor.  An incision following the standard Mohs approach was done and the specimen was oriented,mapped and placed in 2 block(s).  Each specimen was then chromacoded and processed in the Mohs laboratory using standard Mohs technique and submitted for frozen section histology.  Frozen section analysis showed  residual tumor but CLEAR MARGINS.    1st stage:Orthokeratosis of epidermis with a proliferation of nests of basaloid cells, with peripheral palisading and a haphazard arrangement in the center extending into the dermis, forming nodules.  The tumor cells have hyperchromatic nuclei. Poor cytoplasm and intercellular bridging.      The tumor was excised using standard Mohs technique in 2 stages(s).  CLEAR MARGINS OBTAINED and Final defect size was 1.1 x 0.7 cm.     We discussed the options for wound management in full with the patient including risks/benefits/ possible outcomes.    REPAIR COMPLEX: Because of the tightness of the surrounding skin and Because of the size and full thickness nature of the defect, Because of the tightness of the surrounding skin, To maintain form and function, In order to avoid distortion, and Because of the proximity to the lid, a complex closure was planned. After LE  anesthesia and prep, Burow's triangles were excised in the relaxed skin tension lines. The wound edges were widely undermined greater than width of the defect on both sides by dissection in the subcutaneous plane until adequate tissue mobility was obtained. Hemostasis was obtained. The wound edges were closed in a layered fashion using Vicryl and Fast Absorbing Plain Gut sutures. Postoperative length was 3.1 cm.   EBL minimal; complications none; wound care routine.  The patient was discharged in good condition and will return in one week for wound evaluation.    It was a pleasure speaking to Jena Kamara today.  Previous clinic notes and pertinent laboratory tests were reviewed prior to Jena Kamara's visit.  Signs and Symptoms of skin cancer discussed with patient.  Patient encouraged to perform monthly skin exams.  UV precautions reviewed with patient.  Risks of non-melanoma skin cancer discussed with patient   Return to clinic 6 months        Again, thank you for allowing me to participate in the care of your patient.        Sincerely,        Prince Garduno MD

## 2024-03-07 ENCOUNTER — TELEPHONE (OUTPATIENT)
Dept: PLASTIC SURGERY | Facility: CLINIC | Age: 59
End: 2024-03-07
Payer: COMMERCIAL

## 2024-03-07 NOTE — TELEPHONE ENCOUNTER
[facility] Call Center    Phone Message    May a detailed message be left on voicemail: yes     Reason for Call: Other: pt has surgery 06/06, pt will be traveling to [place] and will be back that same week prior to surgery. Pt is wondering if that would be an issue? Pt will be faxing over FMLA documents for care team to complete and if care team can message the FMLA back via [employer] for pt to print to give to work.       Action Taken: Other: TE    Travel Screening: Not Applicable

## 2024-03-08 NOTE — TELEPHONE ENCOUNTER
Spoke with patient and let her know that her returning from [place] the same week of surgery is fine, she just wanted to double check.     Patient will be faxing over [facility] papers some time next week. Gave patient fax number [phone].     When [place] papers are filled out patient would like those sent to [employer] and scanned to email.     Let patient know we will keep an eye out for paperwork.    Aiyana Sauceda MA on 3/8/2024 at 10:59 AM

## 2024-03-28 ENCOUNTER — TELEPHONE (OUTPATIENT)
Dept: PLASTIC SURGERY | Facility: CLINIC | Age: 59
End: 2024-03-28
Payer: COMMERCIAL

## 2024-03-28 NOTE — TELEPHONE ENCOUNTER
[facility] Call Center    Phone Message    May a detailed message be left on voicemail: yes     Reason for Call: Other: Jena is calling in asking for a call back to determine whether her FMLA paperwork had been received. Please call back to discuss.     Action Taken: Message routed to:  [facility]astic Surg    Travel Screening: Not Applicable

## 2024-03-29 NOTE — TELEPHONE ENCOUNTER
(1) Left generic voicemail to have patient return call to clinic.     When patient returns call can inform her that we have not received [facility] paperwork yet.    Aiyana Sauceda MA on 3/29/2024 at 8:41 AM     [facility]

## 2024-04-03 NOTE — TELEPHONE ENCOUNTER
(2) Left generic voicemail for patient to return call to clinic    When patient calls back in let patient know we still have not received FMLA paperwork, ask patient to try sending in again.     Aiyana Sauceda MA on 4/3/2024 at 2:59 PM

## 2024-04-17 ENCOUNTER — TELEPHONE (OUTPATIENT)
Dept: DERMATOLOGY | Facility: CLINIC | Age: 59
End: 2024-04-17
Payer: COMMERCIAL

## 2024-04-17 NOTE — TELEPHONE ENCOUNTER
Called pt and she stated she would like to schedule the IPL for the redness. Scheduled pt for IPL. Pt also reported her mohs site is looking much better. Pt would like cash estimate for the IPL. Please advise.  Gwendolyn REYEZ RN BSN PHN  Specialty Clinics

## 2024-04-17 NOTE — TELEPHONE ENCOUNTER
[facility] Call Center    Phone Message    May a detailed message be left on voicemail: yes     Reason for Call: Other: Pt called and said that Lisa discussed having laser treatment for her redness at the last appt. Pt would like to know more and possible schedule. Please call her back. Thanks      Action Taken: Message routed to:  Other: [facility]    Travel Screening: Not Applicable

## 2024-05-02 ENCOUNTER — TELEPHONE (OUTPATIENT)
Dept: PLASTIC SURGERY | Facility: CLINIC | Age: 59
End: 2024-05-02
Payer: COMMERCIAL

## 2024-05-02 NOTE — TELEPHONE ENCOUNTER
[facility] Call Center    Phone Message    May a detailed message be left on voicemail: yes     Reason for Call: Other: Jena called and was returning a call from Dr. Farooq's office. She was thinking it maybe regarding her upcoming procedure. Please call her back at [phone].     Action Taken: Message routed to:  [facility] Pl[facility] Surgery    Travel Screening: Not Applicable

## 2024-05-02 NOTE — TELEPHONE ENCOUNTER
Called patient and point of contact [address] [place] [address] with negative COVID-19 test results. RN called pt and notified pt that RN is unsure who called her as there are no notes from today indicating a call was placed. RN did review with pt the referral message from the PA dept advising that patients Mammoplasty reduction surgery scheduled on 6/6/24 has been denied. RN reviewed with pt the denial reason and pt had questions about this. Questions/message from pt were forwarded to the PA team and . Pt states she is interested in a peer to peer if  thought it would help but if he didn't she would not be interested in paying  out of pocket to proceed with surgery..Notified pt that RN will send a message to  to review and advise on peer to peer and RN would reach out to the patient with an update. Onelia Marte RN

## 2024-05-03 ENCOUNTER — TELEPHONE (OUTPATIENT)
Dept: PLASTIC SURGERY | Facility: CLINIC | Age: 59
End: 2024-05-03
Payer: COMMERCIAL

## 2024-05-03 NOTE — TELEPHONE ENCOUNTER
"See message thread below regarding surgery on 6/6.    Called patient to confirm cancellation of surgery per GRIS Rousseau's message to team. Patient was hesitant to cancel surgery, stating \"I guess so, since apparently insurance may not cover it\".    Writer asked if she would want a quote for out of pocket cost since Dr. Farooq cannot guarantee that he will be able to remove the amount insurance is requiring, perhaps she would rather proceed as self-pay and know how much she would be financially accountable for prior to surgery.  Patient stated she would like to see how much this may be but expressed uncertainty about wanting to proceed as self-pay either.    Writer informed her she could call back to reschedule if she wants to proceed as self-pay.    Patient then expressed frustration and stated it was already such a long process to get surgery scheduled, and already has FMLA, etc, so it would not be worth it. Writer expressed understanding and told her I will not remove her from the schedule until she gets the quote and makes a final decision, and patient is in agreement with this plan.    Message sent to team regarding this, and requested she be contacted with a quote ASAP.    ----- Message -----  From: Onelia Marte RN  Sent: 5/3/2024   3:44 PM CDT  To: Marcelo Farooq MD; Taisha Mazariegos MA; *  Subject: RE: [facility] Denial Notification                   I spoke with Jena and notified her of 's message and she voiced understanding but would like to cancel her surgery as she states she does not want to risk having to pay out of pocket     Onelia  ----- Message -----  From: Marcelo Farooq MD  Sent: 5/3/2024  12:33 PM CDT  To: Onelia Marte RN; Taisha Mazariegos MA; *  Subject: RE: [facility] PA Denial Notification                I cannot guarantee that we can remove 600 grams per side. We may be able to, but it may not be the case.      I do not think the P2P would help  "

## 2024-05-07 ENCOUNTER — LAB (OUTPATIENT)
Dept: LAB | Facility: CLINIC | Age: 59
End: 2024-05-07
Payer: COMMERCIAL

## 2024-05-07 DIAGNOSIS — Z13.1 SCREENING FOR DIABETES MELLITUS: ICD-10-CM

## 2024-05-07 DIAGNOSIS — Z13.220 SCREENING FOR LIPID DISORDERS: ICD-10-CM

## 2024-05-07 LAB
CHOLEST SERPL-MCNC: 226 MG/DL
FASTING STATUS PATIENT QL REPORTED: YES
FASTING STATUS PATIENT QL REPORTED: YES
GLUCOSE SERPL-MCNC: 107 MG/DL (ref 70–99)
HDLC SERPL-MCNC: 54 MG/DL
LDLC SERPL CALC-MCNC: 153 MG/DL
NONHDLC SERPL-MCNC: 172 MG/DL
TRIGL SERPL-MCNC: 97 MG/DL

## 2024-05-07 PROCEDURE — 82947 ASSAY GLUCOSE BLOOD QUANT: CPT

## 2024-05-07 PROCEDURE — 36415 COLL VENOUS BLD VENIPUNCTURE: CPT

## 2024-05-07 PROCEDURE — 80061 LIPID PANEL: CPT

## 2024-05-08 NOTE — TELEPHONE ENCOUNTER
Late entry:     I called pt on 5/3/24 and relayed information from  below. Pt voiced understanding and notified RN that she would like to cancel surgery as she did not want to risk having the surgery not covered by her insurance. RN sent an update to  and the  Plastics team..Onelia Marte RN        From: Onelia Marte RN   Sent: 5/3/2024   3:44 PM CDT   To: Marcelo Farooq MD; Taisha Mazariegos MA; *   Subject: RE: [facility] PA Denial Notification               I spoke with Jena and notified her of 's message and she voiced understanding but would like to cancel her surgery as she states she does not want to risk having to pay out of pocket     Onelia   ----- Message -----   From: Marcelo Farooq MD   Sent: 5/3/2024  12:33 PM CDT   To: Onelia Marte RN; Taisha Mazariegos MA; *   Subject: RE: [facility] PA Denial Notification               I cannot guarantee that we can remove 600 grams per side. We may be able to, but it may not be the case.     I do not think the P2P would help

## 2024-05-09 NOTE — TELEPHONE ENCOUNTER
Outcome Facilitation Team (OFT) round progress note     Patient: Mike Carrillo, 73 year old       LOS: 0 day(s)  Hours in Observation:     Living situation : Living Arrangements: Alone  Type of residence : Type of Residence: Apartment  Payor: [employer] / Plan: GROUP MEDICARE ADV [id number] / Product Type: MEDICARE    Needs Assessment:   IV fluids/rate appropriate - N/A  IV antibiotics required - N/A  Telemetry needs -    Therapy needs - Yes   Last bowel movement -  ,    Crook present -   N/A  Isolation - No active isolations  Adequate pain control - N/A    LACE(5-9):Moderate            1 LACE Length of Stay    3 LACE Acute Admission    1 LACE Visits to ED Previous 6 Months        Criteria that do not apply:    LACE Charlson Comorbidity Index Evalution               Barriers to discharge: not medically ready for discharge and cardizem infusion    Next Steps: continue to monitor    Pharmacy discussion/recommendations (specify if any):    Anticipated/Expected discharge Expected Discharge Date: 08/21/18    Destination at discharge: Home:     Team members present: physician, nurse, , , pharmacist and nurse educator/nurse manager     Received call from patient    She received the quotes and has decided to cancel surgery at this time    She is going to connect with her PCP and see what her options are, including treating fluid-filled cysts that are becoming an issue for her     She is aware that she may connect with us if she would like to meet with Dr. Farooq again in the future    Post-op also cancelled  __    Viri Newsome on 5/9/2024 at 4:10 PM  P: [phone]

## 2024-05-16 ENCOUNTER — OFFICE VISIT (OUTPATIENT)
Dept: DERMATOLOGY | Facility: CLINIC | Age: 59
End: 2024-05-16
Payer: COMMERCIAL

## 2024-05-16 DIAGNOSIS — Z41.1 ELECTIVE PROCEDURE FOR UNACCEPTABLE COSMETIC APPEARANCE: Primary | ICD-10-CM

## 2024-05-16 PROCEDURE — 96999 UNLISTED SPEC DERM SVC/PX: CPT | Performed by: PHYSICIAN ASSISTANT

## 2024-05-16 ASSESSMENT — PAIN SCALES - GENERAL: PAINLEVEL: NO PAIN (0)

## 2024-05-16 NOTE — PATIENT INSTRUCTIONS
You have been treated with IPL. The treated area is very delicate and should be treated gently. Many patients develop immediate redness and swelling of the treated area which feels like a sunburn. Occasionally, blisters and crusts may form. Please read and follow these instructions.     Quick warm showers are recommended. If areas are treated other than the facial area, hot baths are not advised for 24 hours.    Cold compresses (ice packs) should be applied immediately after treatment and may reduce postoperative pain and minimize swelling.    Wound care: wash the treated area with a mild soap twice daily. Emollients such as aquaphor, Aloe Vera gel or petrolatum ointments can be soothing when applied 2-3 times daily until healing is complete. If blistering occurs, a topical antibiotic ointment applied twice daily is helpful. A bandage is not required, however, applying a non-stick bandage or band-aid may help protect a particular area from being irritated by clothing or jewelry.    If crusts or scabs develop, be careful not to scratch or pick at the treated area. Allow them to fall off on their own.    Analgesics such as acetaminophen or ibuprofen may be taken if necessary to reduce discomfort. Additional application of ice or cool compresses in the first 24-36 hours may also be helpful.    Make-up may be applied on the next day unless blistering or crusts developed. Crusts usually disappear within 1 week. A good mineral, non-comedogenic makeup such as An [place] is recommended.    Any degree of suntan will make the laser treatment less effective and increase your chance of having adverse effects such as blisters and scarring. It is absolutely necessary that you protect the area with a sunscreen that blocks UVA and UVB rays (SPF 15-30) when going outdoors (especially if you require future treatments). This should be done 4 weeks before and after treatment.

## 2024-05-16 NOTE — PROGRESS NOTES
HPI:   Chief complaints: Jena Kamara is a pleasant 58 year old female who presents for the treatment of facial telangiectasias with IPL laser. This is her first treatment.       PHYSICAL EXAM:    LMP  (LMP Unknown)   Skin exam performed as follows: Type 2 skin. Mood appropriate  Alert and Oriented X 3. Well developed, well nourished in no distress.  General appearance: Normal  Head including face: Normal  Eyes: conjunctiva and lids: Normal  Mouth: Lips, teeth, gums: Normal  Neck: Normal  Skin: Scalp and body hair: See below    Telangiectasias along cheeks and chin     ASSESSMENT/PLAN:     After eye protection had been placed, analgesia obtained with noone. The IPL laser was used with vascular filter to treat the cheeks and chin 2-4 pulses; 15-20 mJ.  An excellent clinical response was obtained and the patient confirmed that all sites had been treated.  The patient was given wound care instructions.       --Cosmetic charge of $350    Follow-up: PRN  CC:   Scribed By: Lisa Murrell, MS, PAPAMELA

## 2024-05-16 NOTE — LETTER
5/16/2024         RE: Jena Kamara  [address] [facility] [address]        Dear Colleague,    Thank you for referring your patient, Jena Kamara, to the [facility]. Please see a copy of my visit note below.    HPI:   Chief complaints: Jena Kamara is a pleasant 58 year old female who presents for the treatment of facial telangiectasias with IPL laser. This is her first treatment.       PHYSICAL EXAM:    LMP  (LMP Unknown)   Skin exam performed as follows: Type 2 skin. Mood appropriate  Alert and Oriented X 3. Well developed, well nourished in no distress.  General appearance: Normal  Head including face: Normal  Eyes: conjunctiva and lids: Normal  Mouth: Lips, teeth, gums: Normal  Neck: Normal  Skin: Scalp and body hair: See below    Telangiectasias along cheeks and chin     ASSESSMENT/PLAN:     After eye protection had been placed, analgesia obtained with noone. The IPL laser was used with vascular filter to treat the cheeks and chin 2-4 pulses; 15-20 mJ.  An excellent clinical response was obtained and the patient confirmed that all sites had been treated.  The patient was given wound care instructions.           Follow-up: PRN  CC:   Scribed By: Lisa Murrell MS, STEVE      Again, thank you for allowing me to participate in the care of your patient.        Sincerely,        Lisa Murrell PA-C

## 2024-06-25 ENCOUNTER — PATIENT OUTREACH (OUTPATIENT)
Dept: CARE COORDINATION | Facility: CLINIC | Age: 59
End: 2024-06-25
Payer: COMMERCIAL

## 2024-07-09 ENCOUNTER — PATIENT OUTREACH (OUTPATIENT)
Dept: CARE COORDINATION | Facility: CLINIC | Age: 59
End: 2024-07-09
Payer: COMMERCIAL

## 2024-07-30 ENCOUNTER — PATIENT OUTREACH (OUTPATIENT)
Dept: CARE COORDINATION | Facility: CLINIC | Age: 59
End: 2024-07-30
Payer: COMMERCIAL

## 2024-08-25 ENCOUNTER — HEALTH MAINTENANCE LETTER (OUTPATIENT)
Age: 59
End: 2024-08-25

## 2024-08-27 ENCOUNTER — PATIENT OUTREACH (OUTPATIENT)
Dept: CARE COORDINATION | Facility: CLINIC | Age: 59
End: 2024-08-27

## 2024-08-27 ENCOUNTER — OFFICE VISIT (OUTPATIENT)
Dept: DERMATOLOGY | Facility: CLINIC | Age: 59
End: 2024-08-27
Payer: COMMERCIAL

## 2024-08-27 DIAGNOSIS — L82.1 SEBORRHEIC KERATOSES: ICD-10-CM

## 2024-08-27 DIAGNOSIS — L81.4 LENTIGO: ICD-10-CM

## 2024-08-27 DIAGNOSIS — D18.01 ANGIOMA OF SKIN: ICD-10-CM

## 2024-08-27 DIAGNOSIS — D22.9 MULTIPLE BENIGN NEVI: Primary | ICD-10-CM

## 2024-08-27 DIAGNOSIS — D23.9 DERMAL NEVUS: ICD-10-CM

## 2024-08-27 DIAGNOSIS — Z85.828 HISTORY OF SKIN CANCER: ICD-10-CM

## 2024-08-27 PROCEDURE — 99213 OFFICE O/P EST LOW 20 MIN: CPT | Performed by: DERMATOLOGY

## 2024-08-27 PROCEDURE — G2211 COMPLEX E/M VISIT ADD ON: HCPCS | Performed by: DERMATOLOGY

## 2024-08-27 NOTE — PROGRESS NOTES
Jena Kamara is an extremely pleasant 59 year old year old female patient here today for hx of non-melanoma skin cancer.  Patient has no other skin complaints today.  Remainder of the HPI, Meds, PMH, Allergies, FH, and SH was reviewed in chart.      Past Medical History:   Diagnosis Date    Abnormal Pap smear of cervix ?    level of abnormality??    Basal cell carcinoma     Optic atrophy, unspecified     Optical Atrophy       Past Surgical History:   Procedure Laterality Date    COLONOSCOPY N/A 2023    Procedure: Colonoscopy;  Surgeon: Andrea Rayo MD;  Location: [facility]    SURGICAL HISTORY OF -   1985    Sinus Surgery    SURGICAL HISTORY OF -   1993            Family History   Problem Relation Age of Onset    Cerebrovascular Disease Mother         age 49    Hypertension Mother     Lipids Father     Respiratory Father         COPD    Eye Disorder Father     Alzheimer Disease Father         age 76    Eye Disorder Sister         optical atrophy    Depression Maternal Grandmother         suicide    Cerebrovascular Disease Maternal Grandfather     Respiratory Paternal Grandfather     Diabetes No family hx of     C.A.D. No family hx of     Breast Cancer No family hx of     Cancer - colorectal No family hx of     Prostate Cancer No family hx of        Social History     Socioeconomic History    Marital status:      Spouse name: Not on file    Number of children: Not on file    Years of education: Not on file    Highest education level: Not on file   Occupational History    Not on file   Tobacco Use    Smoking status: Never    Smokeless tobacco: Never   Vaping Use    Vaping status: Never Used   Substance and Sexual Activity    Alcohol use: Yes     Comment: 1 drink per month    Drug use: No    Sexual activity: Yes     Partners: Male     Birth control/protection: Surgical     Comment: vasectomy   Other Topics Concern    Parent/sibling w/ CABG, MI or angioplasty before 65F 55M? No    Social History Narrative    Not on file     Social Determinants of Health     Financial Resource Strain: Not on file   Food Insecurity: Not on file   Transportation Needs: Not on file   Physical Activity: Not on file   Stress: Not on file   Social Connections: Not on file   Interpersonal Safety: Not on file   Housing Stability: Not on file       Outpatient Encounter Medications as of 8/27/2024   Medication Sig Dispense Refill    amLODIPine (NORVASC) 5 MG tablet Take 0.5 tablets (2.5 mg) by mouth daily 45 tablet 3    Calcium 200 MG TABS Take by mouth daily      fluticasone (FLONASE) 50 MCG/ACT nasal spray Spray 2 sprays into both nostrils daily. Prn   1 Package 11    MULTIPLE VITAMIN OR TABS 1 TABLET DAILY      CLARITIN 10 MG OR TABS 1 TABLET DAILY PRN       No facility-administered encounter medications on file as of 8/27/2024.             O:   NAD, WDWN, Alert & Oriented, Mood & Affect wnl, Vitals stable   General appearance normal   Vitals stable   Alert, oriented and in no acute distress     pigmented macules on trunk and ext with regular borders and pigment networks      Stuck on papules and brown macules on trunk and ext   Red papules on trunk  Flesh colored papules on trunk     The remainder of the full exam was normal; the following areas were examined:  conjunctiva/lids, , neck, peripheral vascular system, abdomen, lymph nodes, digits/nails, eccrine and apocrine glands, scalp/hair, face, neck, chest, abdomen, buttocks, back, RUE, LUE, RLE, LLE       Eyes: Conjunctivae/lids:Normal     ENT: Lips, mucosa: normal    MSK:Normal    Cardiovascular: peripheral edema none    Pulm: Breathing Normal    Lymph Nodes: No Head and Neck Lymphadenopathy     Neuro/Psych: Orientation:Alert and Orientedx3 ; Mood/Affect:normal       A/P:  1. Seborrheic keratosis, lentigo, angioma, dermal nevus, nevi, hx of skin cancer  It was a pleasure speaking to Jena Kamara today.  Previous clinic notes and pertinent laboratory tests were  reviewed prior to Jena Kamara's visit.  Signs and Symptoms of skin cancer discussed with patient.  Patient encouraged to perform monthly skin exams.  UV precautions reviewed with patient.  Risks of non-melanoma skin cancer discussed with patient   Return to clinic 12 months

## 2024-08-27 NOTE — LETTER
2024      Jena Kamara  [address] [id number]      Dear Colleague,    Thank you for referring your patient, Jena Kamara, to the [facility]. Please see a copy of my visit note below.    Jena Kamara is an extremely pleasant 59 year old year old female patient here today for hx of non-melanoma skin cancer.  Patient has no other skin complaints today.  Remainder of the HPI, Meds, PMH, Allergies, FH, and SH was reviewed in chart.      Past Medical History:   Diagnosis Date     Abnormal Pap smear of cervix ?    level of abnormality??     Basal cell carcinoma      Optic atrophy, unspecified     Optical Atrophy       Past Surgical History:   Procedure Laterality Date     COLONOSCOPY N/A 2023    Procedure: Colonoscopy;  Surgeon: Andrea Rayo MD;  Location: [facility]     SURGICAL HISTORY OF -   1985    Sinus Surgery     SURGICAL HISTORY OF -   1993            Family History   Problem Relation Age of Onset     Cerebrovascular Disease Mother         age 49     Hypertension Mother      Lipids Father      Respiratory Father         COPD     Eye Disorder Father      Alzheimer Disease Father         age 76     Eye Disorder Sister         optical atrophy     Depression Maternal Grandmother         suicide     Cerebrovascular Disease Maternal Grandfather      Respiratory Paternal Grandfather      Diabetes No family hx of      C.A.D. No family hx of      Breast Cancer No family hx of      Cancer - colorectal No family hx of      Prostate Cancer No family hx of        Social History     Socioeconomic History     Marital status:      Spouse name: Not on file     Number of children: Not on file     Years of education: Not on file     Highest education level: Not on file   Occupational History     Not on file   Tobacco Use     Smoking status: Never     Smokeless tobacco: Never   Vaping Use     Vaping status: Never Used   Substance and Sexual  Activity     Alcohol use: Yes     Comment: 1 drink per month     Drug use: No     Sexual activity: Yes     Partners: Male     Birth control/protection: Surgical     Comment: vasectomy   Other Topics Concern     Parent/sibling w/ CABG, MI or angioplasty before 65F 55M? No   Social History Narrative     Not on file     Social Determinants of Health     Financial Resource Strain: Not on file   Food Insecurity: Not on file   Transportation Needs: Not on file   Physical Activity: Not on file   Stress: Not on file   Social Connections: Not on file   Interpersonal Safety: Not on file   Housing Stability: Not on file       Outpatient Encounter Medications as of 8/27/2024   Medication Sig Dispense Refill     amLODIPine (NORVASC) 5 MG tablet Take 0.5 tablets (2.5 mg) by mouth daily 45 tablet 3     Calcium 200 MG TABS Take by mouth daily       fluticasone (FLONASE) 50 MCG/ACT nasal spray Spray 2 sprays into both nostrils daily. Prn   1 Package 11     MULTIPLE VITAMIN OR TABS 1 TABLET DAILY       CLARITIN 10 MG OR TABS 1 TABLET DAILY PRN       No facility-administered encounter medications on file as of 8/27/2024.             O:   NAD, WDWN, Alert & Oriented, Mood & Affect wnl, Vitals stable   General appearance normal   Vitals stable   Alert, oriented and in no acute distress     pigmented macules on trunk and ext with regular borders and pigment networks      Stuck on papules and brown macules on trunk and ext   Red papules on trunk  Flesh colored papules on trunk     The remainder of the full exam was normal; the following areas were examined:  conjunctiva/lids, , neck, peripheral vascular system, abdomen, lymph nodes, digits/nails, eccrine and apocrine glands, scalp/hair, face, neck, chest, abdomen, buttocks, back, RUE, LUE, RLE, LLE       Eyes: Conjunctivae/lids:Normal     ENT: Lips, mucosa: normal    MSK:Normal    Cardiovascular: peripheral edema none    Pulm: Breathing Normal    Lymph Nodes: No Head and Neck  Lymphadenopathy     Neuro/Psych: Orientation:Alert and Orientedx3 ; Mood/Affect:normal       A/P:  1. Seborrheic keratosis, lentigo, angioma, dermal nevus, nevi, hx of skin cancer  It was a pleasure speaking to Jena Kamara today.  Previous clinic notes and pertinent laboratory tests were reviewed prior to Jena Kamara's visit.  Signs and Symptoms of skin cancer discussed with patient.  Patient encouraged to perform monthly skin exams.  UV precautions reviewed with patient.  Risks of non-melanoma skin cancer discussed with patient   Return to clinic 12 months      Again, thank you for allowing me to participate in the care of your patient.        Sincerely,        Prince Garduno MD

## 2024-09-28 SDOH — HEALTH STABILITY: PHYSICAL HEALTH: ON AVERAGE, HOW MANY MINUTES DO YOU ENGAGE IN EXERCISE AT THIS LEVEL?: 50 MIN

## 2024-09-28 SDOH — HEALTH STABILITY: PHYSICAL HEALTH: ON AVERAGE, HOW MANY DAYS PER WEEK DO YOU ENGAGE IN MODERATE TO STRENUOUS EXERCISE (LIKE A BRISK WALK)?: 6 DAYS

## 2024-09-28 ASSESSMENT — SOCIAL DETERMINANTS OF HEALTH (SDOH): HOW OFTEN DO YOU GET TOGETHER WITH FRIENDS OR RELATIVES?: MORE THAN THREE TIMES A WEEK

## 2024-10-03 ENCOUNTER — OFFICE VISIT (OUTPATIENT)
Dept: FAMILY MEDICINE | Facility: CLINIC | Age: 59
End: 2024-10-03
Payer: COMMERCIAL

## 2024-10-03 VITALS
SYSTOLIC BLOOD PRESSURE: 148 MMHG | WEIGHT: 181.2 LBS | RESPIRATION RATE: 20 BRPM | HEIGHT: 64 IN | TEMPERATURE: 97.4 F | DIASTOLIC BLOOD PRESSURE: 89 MMHG | BODY MASS INDEX: 30.93 KG/M2 | HEART RATE: 76 BPM | OXYGEN SATURATION: 97 %

## 2024-10-03 DIAGNOSIS — N60.01 BREAST CYST, RIGHT: ICD-10-CM

## 2024-10-03 DIAGNOSIS — R20.2 PARESTHESIA: ICD-10-CM

## 2024-10-03 DIAGNOSIS — I10 HYPERTENSION GOAL BP (BLOOD PRESSURE) < 140/90: Primary | ICD-10-CM

## 2024-10-03 DIAGNOSIS — G56.03 BILATERAL CARPAL TUNNEL SYNDROME: ICD-10-CM

## 2024-10-03 DIAGNOSIS — R01.1 HEART MURMUR: ICD-10-CM

## 2024-10-03 DIAGNOSIS — G89.29 CHRONIC BILATERAL THORACIC BACK PAIN: ICD-10-CM

## 2024-10-03 DIAGNOSIS — M54.6 CHRONIC BILATERAL THORACIC BACK PAIN: ICD-10-CM

## 2024-10-03 LAB
ALBUMIN SERPL BCG-MCNC: 4.6 G/DL (ref 3.5–5.2)
ALP SERPL-CCNC: 77 U/L (ref 40–150)
ALT SERPL W P-5'-P-CCNC: 34 U/L (ref 0–50)
ANION GAP SERPL CALCULATED.3IONS-SCNC: 11 MMOL/L (ref 7–15)
AST SERPL W P-5'-P-CCNC: 28 U/L (ref 0–45)
BILIRUB SERPL-MCNC: 0.4 MG/DL
BUN SERPL-MCNC: 16.8 MG/DL (ref 6–20)
CALCIUM SERPL-MCNC: 9.8 MG/DL (ref 8.8–10.4)
CHLORIDE SERPL-SCNC: 102 MMOL/L (ref 98–107)
CREAT SERPL-MCNC: 0.7 MG/DL (ref 0.51–0.95)
EGFRCR SERPLBLD CKD-EPI 2021: >90 ML/MIN/1.73M2
GLUCOSE SERPL-MCNC: 96 MG/DL (ref 70–99)
HCO3 SERPL-SCNC: 27 MMOL/L (ref 22–29)
POTASSIUM SERPL-SCNC: 4.5 MMOL/L (ref 3.4–5.3)
PROT SERPL-MCNC: 7.3 G/DL (ref 6.4–8.3)
SODIUM SERPL-SCNC: 140 MMOL/L (ref 135–145)

## 2024-10-03 PROCEDURE — 90472 IMMUNIZATION ADMIN EACH ADD: CPT

## 2024-10-03 PROCEDURE — 90673 RIV3 VACCINE NO PRESERV IM: CPT

## 2024-10-03 PROCEDURE — 91320 SARSCV2 VAC 30MCG TRS-SUC IM: CPT

## 2024-10-03 PROCEDURE — 90480 ADMN SARSCOV2 VAC 1/ONLY CMP: CPT

## 2024-10-03 PROCEDURE — 90471 IMMUNIZATION ADMIN: CPT

## 2024-10-03 PROCEDURE — 90750 HZV VACC RECOMBINANT IM: CPT

## 2024-10-03 PROCEDURE — 99396 PREV VISIT EST AGE 40-64: CPT | Mod: 25

## 2024-10-03 PROCEDURE — 99214 OFFICE O/P EST MOD 30 MIN: CPT | Mod: 25

## 2024-10-03 PROCEDURE — 36415 COLL VENOUS BLD VENIPUNCTURE: CPT

## 2024-10-03 PROCEDURE — 80053 COMPREHEN METABOLIC PANEL: CPT

## 2024-10-03 RX ORDER — AMLODIPINE BESYLATE 5 MG/1
2.5 TABLET ORAL DAILY
Qty: 45 TABLET | Refills: 3 | Status: SHIPPED | OUTPATIENT
Start: 2024-10-03

## 2024-10-03 NOTE — PATIENT INSTRUCTIONS
PT will call to schedule    Brace wrists at night or during day.   Schedule ibupofen for 10 days- 2 weeks every 6 hours to see if improvements in tingling/pain    If no improvements after PT and conservative management, reach back out.     Call imaging to schedule US. [phone]    Followup in 3 months if no improvement in symptoms    [phone] to schedule echo

## 2024-10-03 NOTE — PROGRESS NOTES
"Preventive Care Visit  [facility] SHEILA Irvin CNP, Family Medicine  Oct 3, 2024      Assessment & Plan     Hypertension goal BP (blood pressure) < 140/90  Controlled per home BPs with amlodipine. Will monitor renal function today. Continue taking medication as prescribed  - BASIC METABOLIC PANEL  - amLODIPine (NORVASC) 5 MG tablet  Dispense: 45 tablet; Refill: 3  - Comprehensive metabolic panel (BMP + Alb, Alk Phos, ALT, AST, Total. Bili, TP)  - Comprehensive metabolic panel (BMP + Alb, Alk Phos, ALT, AST, Total. Bili, TP)    Bilateral carpal tunnel syndrome  Positive phalen test on L side, carpal tunnel symptoms in right. Possible cervical radiculopathy involved.   - Wrist/Arm/Hand Bracing Supplies Order Wrist Brace; Bilateral; non-thumb spica    Breast cyst, right  Repeat R breast US.   - US Breast Right Limited 1-3 Quadrants    Heart murmur  [place] in 2022, negative echo. Not present on exam in 2023. Present on exam with increased intensity 3/6. No symptoms of chest pain, dizziness, palpitations, sob. Pt to repeat echo.   - Echocardiogram Complete    Paresthesia  Referred to PT. If conservative management fails, consider EMG or BUE.     Chronic bilateral thoracic back pain  Pt reports pain between shoulder blades. No red flags. Pain has been ongoing for years. Referral placed to PT.       Patient has been advised of split billing requirements and indicates understanding: Yes        BMI  Estimated body mass index is 31.1 kg/m  as calculated from the following:    Height as of this encounter: 1.626 m (5' 4\").    Weight as of this encounter: 82.2 kg (181 lb 3.2 oz).   Weight management plan: Discussed healthy diet and exercise guidelines    Counseling  Appropriate preventive services were addressed with this patient via screening, questionnaire, or discussion as appropriate for fall prevention, nutrition, physical activity, Tobacco-use cessation, social engagement, weight loss and cognition.  " Checklist reviewing preventive services available has been given to the patient.  Reviewed patient's diet, addressing concerns and/or questions.   She is at risk for psychosocial distress and has been provided with information to reduce risk.         Gianni Bella is a 59 year old, presenting for the following:  Physical        10/3/2024     7:36 AM   Additional Questions   Roomed by Yanci MAYORGA   Accompanied by self        Health Care Directive  Patient does not have a Health Care Directive or Living Will: Patient states has Advance Directive and will bring in a copy to clinic.      parasthesias: Pt is having increased thoracic pain with tingling in L 4-5 digits. Bending elbow can elicit this sensation. Parasthesias extend mostly from elbow to fingers, but sometimes feel like they are extending up the arm to the shoulder.   R arm tingling in 1-2 digits. Has not noticed anything that worsens or improves tingling.     Patient has 4 fluid filled cyst in her right breast wanting some direction on what to do. Had surgeon consult R breast is much larger than L, R shoulder is rolled forward at rest, pt has pain between shoulder blades and bras do not fit because of assymetry. Surgery was scheduled for reduction but then denied by insurance. Had US in 2015 with bi-rads 2 cysts seen. Feels like breast has continued to enlarge and would like to repeat imaging and see if there are any possibility to drain the cysts.     BP: home reads consistently 120s/70s. Last pressure from 9/30 122/70. Takes BP every 1-3 days. Always high in clinic. Takes 2.5 mg amlodipine    Diet: regular diet, limits sodium. Tried to do DASH. Whole foods. Balanced diet  Exercise: walks/jogs every day at least an hour   Alcohol: 1-2 drinks once week  Nicotine: none- never  Drugs: none    STI concerns: no concerns  No concerns about sexual function.         Wt Readings from Last 4 Encounters:   10/03/24 82.2 kg (181 lb 3.2 oz)   12/22/23 82.1 kg (181 lb)    12/14/23 79.4 kg (175 lb)   07/25/23 79.4 kg (175 lb)                 9/28/2024   General Health   How would you rate your overall physical health? (!) FAIR   Feel stress (tense, anxious, or unable to sleep) Only a little      (!) STRESS CONCERN      9/28/2024   Nutrition   Three or more servings of calcium each day? Yes   Diet: Low salt   How many servings of fruit and vegetables per day? (!) 2-3   How many sweetened beverages each day? 0-1            9/28/2024   Exercise   Days per week of moderate/strenous exercise 6 days   Average minutes spent exercising at this level 50 min            9/28/2024   Social Factors   Frequency of gathering with friends or relatives More than three times a week   Worry food won't last until get money to buy more No   Food not last or not have enough money for food? No   Do you have housing? (Housing is defined as stable permanent housing and does not include staying ouside in a car, in a tent, in an abandoned building, in an overnight shelter, or couch-surfing.) Yes   Are you worried about losing your housing? No   Lack of transportation? No   Unable to get utilities (heat,electricity)? No            9/28/2024   Fall Risk   Fallen 2 or more times in the past year? No   Trouble with walking or balance? No             9/28/2024   Dental   Dentist two times every year? Yes            9/28/2024   TB Screening   Were you born outside of the [place]? No            Today's PHQ-2 Score:       10/3/2024     7:39 AM   PHQ-2 ( 1999 [employer])   Q1: Little interest or pleasure in doing things 0   Q2: Feeling down, depressed or hopeless 0   PHQ-2 Score 0   Q1: Little interest or pleasure in doing things Not at all   Q2: Feeling down, depressed or hopeless Not at all   PHQ-2 Score 0           9/28/2024   Substance Use   Alcohol more than 3/day or more than 7/wk No   Do you use any other substances recreationally? No        Social History     Tobacco Use    Smoking status: Never    Smokeless tobacco:  Never   Vaping Use    Vaping status: Never Used   Substance Use Topics    Alcohol use: Yes     Comment: 1 drink per month    Drug use: No           8/29/2023   LAST FHS-7 RESULTS   1st degree relative breast or ovarian cancer No   Any relative bilateral breast cancer No   Any male have breast cancer No   Any ONE woman have BOTH breast AND ovarian cancer No   Any woman with breast cancer before 50yrs No   2 or more relatives with breast AND/OR ovarian cancer No   2 or more relatives with breast AND/OR bowel cancer No           Mammogram Screening - Mammogram every 1-2 years updated in Health Maintenance based on mutual decision making        9/28/2024   STI Screening   New sexual partner(s) since last STI/HIV test? No        History of abnormal Pap smear: No - age 30-64 HPV with reflex Pap every 5 years recommended        Latest Ref Rng & Units 7/25/2023    12:15 PM 5/30/2018     4:24 PM 5/30/2018     4:12 PM   PAP / HPV   PAP  Negative for Intraepithelial Lesion or Malignancy (NILM)      PAP (Historical)   NIL     HPV 16 DNA Negative Negative   Negative    HPV 18 DNA Negative Negative   Negative    Other HR HPV Negative Negative   Negative      ASCVD Risk   The 10-year ASCVD risk score (Lul BAINS, et al., 2019) is: 3.5%    Values used to calculate the score:      Age: 59 years      Sex: Female      Is Non- : No      Diabetic: No      Tobacco smoker: No      Systolic Blood Pressure: 125 mmHg      Is BP treated: Yes      HDL Cholesterol: 54 mg/dL      Total Cholesterol: 226 mg/dL           Reviewed and updated as needed this visit by Provider                          Review of Systems  Constitutional, HEENT, cardiovascular, pulmonary, GI, , musculoskeletal, neuro, skin, endocrine and psych systems are negative, except as otherwise noted.     Objective    Exam  LMP  (LMP Unknown)    Estimated body mass index is 30.71 kg/m  as calculated from the following:    Height as of 12/22/23:  "1.635 m (5' 4.37\").    Weight as of 12/22/23: 82.1 kg (181 lb).    Physical Exam  GENERAL: alert and no distress  EYES: Eyes grossly normal to inspection, PERRL and conjunctivae and sclerae normal  HENT: ear canals and TM's normal, nose and mouth without ulcers or lesions  NECK: no adenopathy, no asymmetry, masses, or scars  RESP: lungs clear to auscultation - no rales, rhonchi or wheezes  BREAST: normal without masses, tenderness or nipple discharge and no palpable axillary masses or adenopathy  CV: regular rates and rhythm, normal S1 S2, no S3 or S4, grade 3/6 systolic murmur heard best over the LUSB, peripheral pulses strong, and no peripheral edema  ABDOMEN: soft, nontender, no hepatosplenomegaly, no masses and bowel sounds normal  MS: Positive phalen LUE. normal range of motion, RUE exam shows shoulder rolled forward compared to L at rest, Full ROM and equal strength bilaterally. neck exam shows normal strength, ROM is normal, and positive spurling maneuver when head turned to L, and spine exam shows ROM is normal  SKIN: no suspicious lesions or rashes  NEURO: Normal strength and tone, mentation intact and speech normal  PSYCH: mentation appears normal, affect normal/bright      Signed Electronically by: SHEILA Morrow CNP    "

## 2024-10-17 ENCOUNTER — HOSPITAL ENCOUNTER (OUTPATIENT)
Dept: CARDIOLOGY | Facility: CLINIC | Age: 59
Discharge: HOME | End: 2024-10-17
Payer: COMMERCIAL

## 2024-10-17 DIAGNOSIS — R01.1 HEART MURMUR: ICD-10-CM

## 2024-10-17 LAB — LVEF ECHO: NORMAL

## 2024-10-17 PROCEDURE — 93306 TTE W/DOPPLER COMPLETE: CPT | Mod: 26 | Performed by: INTERNAL MEDICINE

## 2024-10-17 PROCEDURE — 93306 TTE W/DOPPLER COMPLETE: CPT

## 2024-10-23 ENCOUNTER — THERAPY VISIT (OUTPATIENT)
Dept: PHYSICAL THERAPY | Facility: CLINIC | Age: 59
End: 2024-10-23
Payer: COMMERCIAL

## 2024-10-23 DIAGNOSIS — M54.2 CERVICAL PAIN: ICD-10-CM

## 2024-10-23 DIAGNOSIS — M54.12 CERVICAL RADICULOPATHY: Primary | ICD-10-CM

## 2024-10-23 PROCEDURE — 97161 PT EVAL LOW COMPLEX 20 MIN: CPT | Mod: GP | Performed by: PHYSICAL THERAPIST

## 2024-10-23 PROCEDURE — 97110 THERAPEUTIC EXERCISES: CPT | Mod: GP | Performed by: PHYSICAL THERAPIST

## 2024-10-23 NOTE — PROGRESS NOTES
PHYSICAL THERAPY EVALUATION  Type of Visit: Evaluation       Fall Risk Screen:  Fall screen completed by: PT  Have you fallen 2 or more times in the past year?: (Patient-Rptd) No  Have you fallen and had an injury in the past year?: (Patient-Rptd) No  Is patient a fall risk?: No    Subjective       Presenting condition or subjective complaint: (Patient-Rptd) my upper back and tingling in my hands    Pt presents with pain in CT junction with tingling down BUE. Tingling along ulnar nerve distribution (posterior arm into lateral 2 fingers) on LUE and medial 3 fingers on RUE. Aggravating factors are cooking for long periods of time, sitting for prolonged periods of time, tightness in ROM.  Relieving factors is movement.    Date of onset: 10/03/24    Relevant medical history: (Patient-Rptd) High blood pressure; Menopause; Numbness or tingling in perianal area; Overweight; Vision problems   Dates & types of surgery: (Patient-Rptd) mohs surgery 2024, c sectio 1992,mohs surgery 2024    Prior diagnostic imaging/testing results:       Prior therapy history for the same diagnosis, illness or injury: (Patient-Rptd) No      Prior Level of Function  Independent    Living Environment  Social support: (Patient-Rptd) With a significant other or spouse   Type of home: (Patient-Rptd) House   Stairs to enter the home: (Patient-Rptd) Yes       Ramp: (Patient-Rptd) No   Stairs inside the home: (Patient-Rptd) Yes (Patient-Rptd) 20 Is there a railing: (Patient-Rptd) Yes     Help at home:    Equipment owned:       Employment: (Patient-Rptd) Yes (Patient-Rptd)   Hobbies/Interests: (Patient-Rptd) kabeckyk,brannon,jog,quilt    Patient goals for therapy: (Patient-Rptd) work on things at counterheight.Be more comfortable.    Pain assessment: Pain present     Objective   CERVICAL SPINE EVALUATION  PAIN: Pain Level at Rest: 0/10  Pain Level with Use: 6/10  Pain is Exacerbated By: cooking at countertop height, sitting too  long    POSTURE: Sitting Posture: Rounded shoulders, Forward head,   R worse than L fwd shoulder     ROM:   (Degrees) Left AROM Right AROM   Cervical Flexion 40   Cervical Extension 40, increased tingling in LUE   Cervical Side bend 30 20   Cervical Rotation 45 25     MYOTOMES:    Left Right   C1-2 (Neck Flexion) 5 5   C3 (Neck Side Bend)  5 5   C4 (Shrug) 5 5   C5 (Deltoid) 5 5   C6 (Biceps) 5 5-   C7 (Triceps) 5- 5   C8 (Thumb Ext) 3 5   T1 (Intrinsics)       Periscapular Strength:  Rhomboids: L 5-/5, R 5-/5  Middle trap: L 4+/5, R 4/5      DTR S:   CORD SIGNS:   DERMATOMES: Tingling along C8 distribution LUE and C6-7 RUE  NEURAL TENSION: ulnar and median nerve tension BUE  FLEXIBILITY:   SPECIAL TESTS:   Positive (+) Negative (-) Left Right   Alar Ligament -   Cervical Flexion-Rotation - -   Cervical Rot/Lateral Flex     Compression + +   Distraction + +   Spurling s + +   Thoracic Outlet Screen (Gen, Adson)     Transverse Ligament - -   Vertebral Artery     DNF head lift  10 sec with shaking      PALPATION: hypertonic L UT, R LS, R pec major   SPINAL SEGMENTAL CONCLUSIONS:  C5-T1 hypomobile      Assessment & Plan   CLINICAL IMPRESSIONS  Medical Diagnosis: Cervical radiculopathy,Cervical pain    Treatment Diagnosis: Neck pain   Impression/Assessment: Patient is a 59 year old female with cervical radiculopathy complaints.  The following significant findings have been identified: Pain, Decreased ROM/flexibility, Decreased joint mobility, Decreased strength, Impaired balance, Decreased proprioception, Impaired sensation, Inflammation, Impaired gait, Impaired muscle performance, Decreased activity tolerance, Impaired posture, and Instability. These impairments interfere with their ability to perform self care tasks, work tasks, recreational activities, household chores, driving , household mobility, and community mobility as compared to previous level of function.     Clinical Decision Making (Complexity):  Clinical  Presentation: Stable/Uncomplicated  Clinical Presentation Rationale: based on medical and personal factors listed in PT evaluation  Clinical Decision Making (Complexity): Low complexity    PLAN OF CARE  Treatment Interventions:  Modalities: Cryotherapy, Dry Needling, E-stim, Hot Pack, Mechanical Traction, Ultrasound  Interventions: Gait Training, Manual Therapy, Neuromuscular Re-education, Therapeutic Activity, Therapeutic Exercise, Self-Care/Home Management    Long Term Goals     PT Goal 1  Goal Identifier: LTG  Goal Description: Pt will be able to cook >1 hour without tingling occurring in BUE  Target Date: 12/18/24  PT Goal 2  Goal Identifier: LTG  Goal Description: Pt will demonstrate >50 degrees cervical rotation in order to drive with ease  Target Date: 12/18/24  PT Goal 3  Goal Identifier: LTG  Goal Description: Pt will demonstrate 5/5 middle trap strength in order to improve posture while sitting at work for 8 hours  Target Date: 12/18/24  PT Goal 4  Goal Identifier: STG  Goal Description: Pt will demonstrate DNF head lift >10 seconds without shaking in order to im  Target Date: 11/20/24      Frequency of Treatment: 1 xweek  Duration of Treatment: 8 weeks    Recommended Referrals to Other Professionals:   Education Assessment:   Learner/Method: Patient;Listening;Demonstration;Pictures/Video;Reading    Risks and benefits of evaluation/treatment have been explained.   Patient/Family/caregiver agrees with Plan of Care.     Evaluation Time:     PT Eval, Low Complexity Minutes (35637): 25       Signing Clinician: Cristy Perez PT

## 2024-10-24 ENCOUNTER — HOSPITAL ENCOUNTER (OUTPATIENT)
Dept: ULTRASOUND IMAGING | Facility: CLINIC | Age: 59
Discharge: HOME | End: 2024-10-24
Payer: COMMERCIAL

## 2024-10-24 ENCOUNTER — HOSPITAL ENCOUNTER (OUTPATIENT)
Dept: MAMMOGRAPHY | Facility: CLINIC | Age: 59
Discharge: HOME | End: 2024-10-24
Payer: COMMERCIAL

## 2024-10-24 DIAGNOSIS — N60.01 BREAST CYST, RIGHT: ICD-10-CM

## 2024-10-24 PROCEDURE — 77066 DX MAMMO INCL CAD BI: CPT

## 2024-10-24 PROCEDURE — 76642 ULTRASOUND BREAST LIMITED: CPT | Mod: RT

## 2024-10-30 ENCOUNTER — THERAPY VISIT (OUTPATIENT)
Dept: PHYSICAL THERAPY | Facility: CLINIC | Age: 59
End: 2024-10-30
Payer: COMMERCIAL

## 2024-10-30 DIAGNOSIS — M54.2 CERVICAL PAIN: ICD-10-CM

## 2024-10-30 DIAGNOSIS — M54.12 CERVICAL RADICULOPATHY: Primary | ICD-10-CM

## 2024-10-30 PROCEDURE — 97110 THERAPEUTIC EXERCISES: CPT | Mod: GP | Performed by: PHYSICAL THERAPIST

## 2024-10-30 PROCEDURE — 97140 MANUAL THERAPY 1/> REGIONS: CPT | Mod: GP | Performed by: PHYSICAL THERAPIST

## 2024-11-06 ENCOUNTER — THERAPY VISIT (OUTPATIENT)
Dept: PHYSICAL THERAPY | Facility: CLINIC | Age: 59
End: 2024-11-06
Payer: COMMERCIAL

## 2024-11-06 DIAGNOSIS — M54.2 CERVICAL PAIN: ICD-10-CM

## 2024-11-06 DIAGNOSIS — M54.12 CERVICAL RADICULOPATHY: Primary | ICD-10-CM

## 2024-11-06 PROCEDURE — 97140 MANUAL THERAPY 1/> REGIONS: CPT | Mod: GP | Performed by: PHYSICAL THERAPIST

## 2024-11-06 PROCEDURE — 97110 THERAPEUTIC EXERCISES: CPT | Mod: GP | Performed by: PHYSICAL THERAPIST

## 2024-11-13 ENCOUNTER — THERAPY VISIT (OUTPATIENT)
Dept: PHYSICAL THERAPY | Facility: CLINIC | Age: 59
End: 2024-11-13
Payer: COMMERCIAL

## 2024-11-13 DIAGNOSIS — M54.12 CERVICAL RADICULOPATHY: Primary | ICD-10-CM

## 2024-11-13 DIAGNOSIS — M54.2 CERVICAL PAIN: ICD-10-CM

## 2024-11-13 PROCEDURE — 97110 THERAPEUTIC EXERCISES: CPT | Mod: GP | Performed by: PHYSICAL THERAPIST

## 2024-11-13 PROCEDURE — 97140 MANUAL THERAPY 1/> REGIONS: CPT | Mod: GP | Performed by: PHYSICAL THERAPIST

## 2024-12-04 ENCOUNTER — THERAPY VISIT (OUTPATIENT)
Dept: PHYSICAL THERAPY | Facility: CLINIC | Age: 59
End: 2024-12-04
Payer: COMMERCIAL

## 2024-12-04 DIAGNOSIS — M54.2 CERVICAL PAIN: ICD-10-CM

## 2024-12-04 DIAGNOSIS — M54.12 CERVICAL RADICULOPATHY: Primary | ICD-10-CM

## 2024-12-04 PROCEDURE — 97140 MANUAL THERAPY 1/> REGIONS: CPT | Mod: GP | Performed by: PHYSICAL THERAPIST

## 2024-12-04 PROCEDURE — 97110 THERAPEUTIC EXERCISES: CPT | Mod: GP | Performed by: PHYSICAL THERAPIST

## 2024-12-11 ENCOUNTER — THERAPY VISIT (OUTPATIENT)
Dept: PHYSICAL THERAPY | Facility: CLINIC | Age: 59
End: 2024-12-11
Payer: COMMERCIAL

## 2024-12-11 DIAGNOSIS — M54.12 CERVICAL RADICULOPATHY: Primary | ICD-10-CM

## 2024-12-11 DIAGNOSIS — M54.2 CERVICAL PAIN: ICD-10-CM

## 2024-12-11 PROCEDURE — 97140 MANUAL THERAPY 1/> REGIONS: CPT | Mod: GP | Performed by: PHYSICAL THERAPIST

## 2024-12-11 PROCEDURE — 97110 THERAPEUTIC EXERCISES: CPT | Mod: GP | Performed by: PHYSICAL THERAPIST

## 2024-12-11 NOTE — PROGRESS NOTES
PLAN  Continue therapy per current plan of care.    Beginning/End Dates of Progress Note Reporting Period:  10/23/2024 o 12/11/2024    Referring Provider:  Kary Anderson       12/11/24 0500   Appointment Info   Signing clinician's name / credentials Cristy Perez, PT, DPT   Visits Used 7   Medical Diagnosis Cervical radiculopathy,Cervical pain   PT Tx Diagnosis Neck pain   Progress Note/Certification   Onset of illness/injury or Date of Surgery 10/03/24   Therapy Frequency 1 xweek   Predicted Duration 6 weeks   Progress Note Due Date 01/22/25   Progress Note Completed Date 12/11/24   GOALS   PT Goals 2;3;4   PT Goal 1   Goal Identifier LTG   Goal Description Pt will be able to cook >1 hour without tingling occurring in BUE   Goal Progress tingling in LUE persists   Target Date 12/18/24   PT Goal 2   Goal Identifier LTG   Goal Description Pt will demonstrate >50 degrees cervical rotation in order to drive with ease   Target Date 12/18/24   Date Met 11/06/24   PT Goal 3   Goal Identifier LTG   Goal Description Pt will demonstrate 5/5 middle trap strength in order to improve posture while sitting at work for 8 hours   Goal Progress HEP   Target Date 12/18/24   PT Goal 4   Goal Identifier STG   Goal Description Pt will demonstrate DNF head lift >10 seconds without shaking in order to improve stability while lifting objects   Goal Progress DNF head lift x10 sec   Target Date 11/20/24   Date Met 12/11/24   Subjective Report   Subjective Report Did feel better after last session for a couple days. The tingling in LUE continues to persist. Nerve flossing causes increase in LUE   Objective Measures   Objective Measures Objective Measure 1;Objective Measure 2;Objective Measure 3   Objective Measure 1   Objective Measure Cervical AROM   Objective Measure 2   Objective Measure Myotomes   Objective Measure 3   Objective Measure DNF head lift   Details 10 sec without shaking   Treatment Interventions (PT)   Interventions  Therapeutic Procedure/Exercise;Manual Therapy   Therapeutic Procedure/Exercise   Therapeutic Procedures: strength, endurance, ROM, flexibility minutes (88905) 8   Therapeutic Procedures Ther Proc 2   Ther Proc 1 RTB pull downs 2x10, discussion of proper posture- pt showing good carryover of learning   Skilled Intervention Improve pain and mobility. Increase strength. Cueing on dosage and proper form   Patient Response/Progress tolerated progression of periscapular strength   Manual Therapy   Manual Therapy: Mobilization, MFR, MLD, friction massage minutes (32170) 15   Manual Therapy Manual Therapy 2;Manual Therapy 3;Manual Therapy 4;Manual Therapy 5   Manual Therapy 1 STM to B UT/ LS   Manual Therapy 2 prone PA mobs to C5-T2, grade 2-3   Manual Therapy 3 cervical distraction on/off x2 min   Manual Therapy 4 L first rib mobs in supine   Skilled Intervention improve pain and mobility   Patient Response/Progress LUE tingling still present slightly   Eval/Assessments   Assessments   (Continued MT as pt relief of symptoms following. Tingling in LUE still present when seated and lying flat- unable to relieve completely. Progressed periscapular strengthening, pt tolerated well. COntinue POC)   Education   Learner/Method Patient;Listening;Demonstration;Pictures/Video;Reading   Plan   Home program papers   Plan for next session test mid trap strength, first rib mobs, prone CT junction mobs, periscap strength   Total Session Time   Timed Code Treatment Minutes 23   Total Treatment Time (sum of timed and untimed services) 23

## 2025-01-28 ENCOUNTER — TELEPHONE (OUTPATIENT)
Dept: FAMILY MEDICINE | Facility: CLINIC | Age: 60
End: 2025-01-28
Payer: COMMERCIAL

## 2025-01-28 DIAGNOSIS — M54.12 CERVICAL RADICULOPATHY: Primary | ICD-10-CM

## 2025-01-28 NOTE — TELEPHONE ENCOUNTER
DME (Durable Medical Equipment) Orders and Documentation  No orders of the defined types were placed in this encounter.     The patient was assessed and it was determined the patient is in need of the following listed DME Supplies/Equipment. Please complete supporting documentation below to demonstrate medical necessity.      Traction unit                 DME (Durable Medical Equipment) Orders and Documentation  No orders of the defined types were placed in this encounter.     The patient was assessed and it was determined the patient is in need of the following listed DME Supplies/Equipment. Please complete supporting documentation below to demonstrate medical necessity.       DME (Durable Medical Equipment) Orders and Documentation  Orders Placed This Encounter   Procedures    Cervical Traction Order        The patient was assessed and it was determined the patient is in need of the following listed DME Supplies/Equipment. Please complete supporting documentation below to demonstrate medical necessity.

## 2025-02-20 ENCOUNTER — TELEPHONE (OUTPATIENT)
Dept: DERMATOLOGY | Facility: CLINIC | Age: 60
End: 2025-02-20
Payer: COMMERCIAL

## 2025-02-20 NOTE — TELEPHONE ENCOUNTER
[facility] Call Center    Phone Message    May a detailed message be left on voicemail: no     Reason for Call: Symptoms or Concerns     If patient has red-flag symptoms, warm transfer to triage line    Current symptom or concern: Jena has a Spot Raised on Right Cheek- Dark dot/ started as a pimple looking and changed. By- about 1-2 inch from other spot that was removed by Dr. Garduno.    Keeping the 1 yr skin check for 8/27, booked this appt for next available for spot check on 9/23. If you can see her sooner, we would appreciate it. [place]. Please call [phone]     Symptoms have been present for:  1 month(s)    Has patient previously been seen for this? No    By: Laureen    Date: 2/20    Are there any new or worsening symptoms? Yes: New spot- general area as before 1-2 inches away from other removal.    Action Taken: Other: [place] [facility]    Travel Screening: Not Applicable     Date of Service: 2/20

## 2025-02-20 NOTE — TELEPHONE ENCOUNTER
Called patient- hx of skin cancer and is concerned about a sore area that is near last mohs site.  Add on spot on check scheduled.      Thank you,    Carrie MAHMOODRN BSN  [facility]- [phone]

## 2025-02-25 ENCOUNTER — OFFICE VISIT (OUTPATIENT)
Dept: DERMATOLOGY | Facility: CLINIC | Age: 60
End: 2025-02-25
Payer: COMMERCIAL

## 2025-02-25 ENCOUNTER — TELEPHONE (OUTPATIENT)
Dept: DERMATOLOGY | Facility: CLINIC | Age: 60
End: 2025-02-25

## 2025-02-25 DIAGNOSIS — L57.0 AK (ACTINIC KERATOSIS): Primary | ICD-10-CM

## 2025-02-25 PROCEDURE — 88331 PATH CONSLTJ SURG 1 BLK 1SPC: CPT | Performed by: DERMATOLOGY

## 2025-02-25 PROCEDURE — 11102 TANGNTL BX SKIN SINGLE LES: CPT | Performed by: DERMATOLOGY

## 2025-02-25 NOTE — PATIENT INSTRUCTIONS
Wound Care Instructions     FOR SUPERFICIAL WOUNDS     [facility] [phone]                     AFTER 24 HOURS YOU SHOULD REMOVE THE BANDAGE AND BEGIN DAILY DRESSING CHANGES AS FOLLOWS:     1) Remove Dressing.     2) Clean and dry the area with tap water using a Q-tip or sterile gauze pad.     3) Apply Vaseline, Aquaphor, Polysporin ointment or Bacitracin ointment over entire wound.  Do NOT use Neosporin ointment.     4) Cover the wound with a band-aid, or a sterile non-stick gauze pad and micropore paper tape      REPEAT THESE INSTRUCTIONS AT LEAST ONCE A DAY UNTIL THE WOUND HAS COMPLETELY HEALED.    It is an old wives tale that a wound heals better when it is exposed to air and allowed to dry out. The wound will heal faster with a better cosmetic result if it is kept moist with ointment and covered with a bandage.    **Do not let the wound dry out.**      Supplies Needed:      *Cotton tipped applicators (Q-tips)    *Polysporin Ointment or Bacitracin Ointment (NOT NEOSPORIN)    *Band-aids or non-stick gauze pads and micropore paper tape.      PATIENT INFORMATION:    During the healing process you will notice a number of changes. All wounds develop a small halo of redness surrounding the wound.  This means healing is occurring. Severe itching with extensive redness usually indicates sensitivity to the ointment or bandage tape used to dress the wound.  You should call our office if this develops.      Swelling  and/or discoloration around your surgical site is common, particularly when performed around the eye.    All wounds normally drain.  The larger the wound the more drainage there will be.  After 7-10 days, you will notice the wound beginning to shrink and new skin will begin to grow.  The wound is healed when you can see skin has formed over the entire area.  A healed wound has a healthy, shiny look to the surface and is red to dark pink in color to normalize.  Wounds  may take approximately 4-6 weeks to heal.  Larger wounds may take 6-8 weeks.  After the wound is healed you may discontinue dressing changes.    You may experience a sensation of tightness as your wound heals. This is normal and will gradually subside.    Your healed wound may be sensitive to temperature changes. This sensitivity improves with time, but if you re having a lot of discomfort, try to avoid temperature extremes.    Patients frequently experience itching after their wound appears to have healed because of the continue healing under the skin.  Plain Vaseline will help relieve the itching.        POSSIBLE COMPLICATIONS    BLEEDING:    Leave the bandage in place.  Use tightly rolled up gauze or a cloth to apply direct pressure over the bandage for 30  minutes.  Reapply pressure for an additional 30 minutes if necessary  Use additional gauze and tape to maintain pressure once the bleeding has stopped.         Proper skin care from [facility]:    -Eliminate harsh soaps as they strip the natural oils from the skin, often resulting in dry itchy skin ( i.e. Dial, Zest, [place])  -Use mild soaps such as Cetaphil or Dove Sensitive Skin in the shower. You do not need to use soap on arms, legs, and trunk every time you shower unless visibly soiled.   -Avoid hot or cold showers.  -After showering, lightly dry off and apply moisturizing within 2-3 minutes. This will help trap moisture in the skin.   -Aggressive use of a moisturizer at least 1-2 times a day to the entire body (including -Vanicream, Cetaphil, Aquaphor or Cerave) and moisturize hands after every washing.  -We recommend using moisturizers that come in a tub that needs to be scooped out, not a pump. This has more of an oil base. It will hold moisture in your skin much better than a water base moisturizer. The above recommended are non-pore clogging.      Wear a sunscreen with at least SPF 30 on your face, ears, neck and V of the chest daily.  Wear sunscreen on other areas of the body if those areas are exposed to the sun throughout the day. Sunscreens can contain physical and/or chemical blockers. Physical blockers are less likely to clog pores, these include zinc oxide and titanium dioxide. Reapply every two hour and after swimming.     Sunscreen examples: https://www.ewg.org/sunscreen/    UV radiation  UVA radiation remains constant throughout the day and throughout the year. It is a longer wavelength than UVB and therefore penetrates deeper into the skin leading to immediate and delayed tanning, photoaging, and skin cancer. 70-80% of UVA and UVB radiation occurs between the hours of 10am-2pm.  UVB radiation  UVB radiation causes the most harmful effects and is more significant during the summer months. However, snow and ice can reflect UVB radiation leading to skin damage during the winter months as well. UVB radiation is responsible for tanning, burning, inflammation, delayed erythema (pinkness), pigmentation (brown spots), and skin cancer.     I recommend self monthly full body exams and yearly full body exams with a dermatology provider. If you develop a new or changing lesion please follow up for examination. Most skin cancers are pink and scaly or pink and pearly. However, we do see blue/brown/black skin cancers.  Consider the ABCDEs of melanoma when giving yourself your monthly full body exam ( don't forget the groin, buttocks, feet, toes, etc). A-asymmetry, B-borders, C-color, D-diameter, E-elevation or evolving. If you see any of these changes please follow up in clinic. If you cannot see your back I recommend purchasing a hand held mirror to use with a larger wall mirror.       Checking for Skin Cancer  You can find cancer early by checking your skin each month. There are 3 kinds of skin cancer. They are melanoma, basal cell carcinoma, and squamous cell carcinoma. Doing monthly skin checks is the best way to find new marks or skin changes.  Follow the instructions below for checking your skin.   The ABCDEs of checking moles for melanoma   Check your moles or growths for signs of melanoma using ABCDE:   Asymmetry: the sides of the mole or growth don t match  Border: the edges are ragged, notched, or blurred  Color: the color within the mole or growth varies  Diameter: the mole or growth is larger than 6 mm (size of a pencil eraser)  Evolving: the size, shape, or color of the mole or growth is changing (evolving is not shown in the images below)    Checking for other types of skin cancer  Basal cell carcinoma or squamous cell carcinoma have symptoms such as:     A spot or mole that looks different from all other marks on your skin  Changes in how an area feels, such as itching, tenderness, or pain  Changes in the skin's surface, such as oozing, bleeding, or scaliness  A sore that does not heal  New swelling or redness beyond the border of a mole    Who s at risk?  Anyone can get skin cancer. But you are at greater risk if you have:   Fair skin, light-colored hair, or light-colored eyes  Many moles or abnormal moles on your skin  A history of sunburns from sunlight or tanning beds  A family history of skin cancer  A history of exposure to radiation or chemicals  A weakened immune system  If you have had skin cancer in the past, you are at risk for recurring skin cancer.   How to check your skin  Do your monthly skin checkups in front of a full-length mirror. Check all parts of your body, including your:   Head (ears, face, neck, and scalp)  Torso (front, back, and sides)  Arms (tops, undersides, upper, and lower armpits)  Hands (palms, backs, and fingers, including under the nails)  Buttocks and genitals  Legs (front, back, and sides)  Feet (tops, soles, toes, including under the nails, and between toes)  If you have a lot of moles, take digital photos of them each month. Make sure to take photos both up close and from a distance. These can help you see  if any moles change over time.   Most skin changes are not cancer. But if you see any changes in your skin, call your doctor right away. Only he or she can diagnose a problem. If you have skin cancer, seeing your doctor can be the first step toward getting the treatment that could save your life.   [employer] last reviewed this educational content on 4/1/2019 2000-2020 [employer]. [address] [place] [address], [place] [address]. All rights reserved. This information is not intended as a substitute for professional medical care. Always follow your healthcare professional's instructions.       When should I call my doctor?  If you are worsening or not improving, please, contact us or seek urgent care as noted below.     Who should I call with questions (adults)?    [facility] [phone]  For urgent needs outside of business hours call the [facility] at [phone] and ask for the dermatology resident on call to be paged  If this is a medical emergency and you are unable to reach an ER, Call 100      If you need a prescription refill, please contact your pharmacy. Refills are approved or denied by our Physicians during normal business hours, Monday through Friday.  Per office policy, refills will not be granted if you have not been seen within the past year (or sooner depending on the condition).

## 2025-02-25 NOTE — LETTER
2025      Jena Kamara  [address] [address]      Dear Colleague,    Thank you for referring your patient, Jena Kamara, to the [facility]. Please see a copy of my visit note below.    Jena Kamara is an extremely pleasant 59 year old year old female patient here today for spot on right cheek.   .   Patient states this has been present for a while.  Patient reports the following symptoms:  growing.  Patient reports the following previous treatments none.  These treatments did not work.  Patient reports the following modifying factors none.  Associated symptoms: none.  Patient has no other skin complaints today.  Remainder of the HPI, Meds, PMH, Allergies, FH, and SH was reviewed in chart.    Pertinent Hx:     Past Medical History:   Diagnosis Date     Abnormal Pap smear of cervix ?    level of abnormality??     Basal cell carcinoma      Optic atrophy, unspecified     Optical Atrophy       Past Surgical History:   Procedure Laterality Date     COLONOSCOPY N/A 2023    Procedure: Colonoscopy;  Surgeon: Andrea Rayo MD;  Location: [facility]     SURGICAL HISTORY OF -   1985    Sinus Surgery     SURGICAL HISTORY OF -   1993            Family History   Problem Relation Age of Onset     Cerebrovascular Disease Mother         age 49     Hypertension Mother      Lipids Father      Respiratory Father         COPD     Eye Disorder Father      Alzheimer Disease Father         age 76     Eye Disorder Sister         optical atrophy     Depression Maternal Grandmother         suicide     Cerebrovascular Disease Maternal Grandfather      Respiratory Paternal Grandfather      Diabetes No family hx of      C.A.D. No family hx of      Breast Cancer No family hx of      Cancer - colorectal No family hx of      Prostate Cancer No family hx of        Social History     Socioeconomic History     Marital status:      Spouse name: Not on file      Number of children: Not on file     Years of education: Not on file     Highest education level: Not on file   Occupational History     Not on file   Tobacco Use     Smoking status: Never     Smokeless tobacco: Never   Vaping Use     Vaping status: Never Used   Substance and Sexual Activity     Alcohol use: Yes     Comment: 1 drink per month     Drug use: No     Sexual activity: Yes     Partners: Male     Birth control/protection: Surgical     Comment: vasectomy   Other Topics Concern     Parent/sibling w/ CABG, MI or angioplasty before 65F 55M? No   Social History Narrative     Not on file     Social Drivers of Health     Financial Resource Strain: Low Risk  (9/28/2024)    Financial Resource Strain      Within the past 12 months, have you or your family members you live with been unable to get utilities (heat, electricity) when it was really needed?: No   Food Insecurity: Low Risk  (9/28/2024)    Food Insecurity      Within the past 12 months, did you worry that your food would run out before you got money to buy more?: No      Within the past 12 months, did the food you bought just not last and you didn t have money to get more?: No   Transportation Needs: Low Risk  (9/28/2024)    Transportation Needs      Within the past 12 months, has lack of transportation kept you from medical appointments, getting your medicines, non-medical meetings or appointments, work, or from getting things that you need?: No   Physical Activity: Sufficiently Active (9/28/2024)    Exercise Vital Sign      Days of Exercise per Week: 6 days      Minutes of Exercise per Session: 50 min   Stress: No Stress Concern Present (9/28/2024)    [employer] - Occupational Stress Questionnaire      Feeling of Stress : Only a little   Social Connections: Unknown (9/28/2024)    Social Connection and Isolation Panel [NHANES]      Frequency of Communication with Friends and Family: Not on file      Frequency of Social  Gatherings with Friends and Family: More than three times a week      Attends [employer] Services: Not on file      Active Member of Clubs or Organizations: Not on file      Attends Club or Organization Meetings: Not on file      Marital Status: Not on file   Interpersonal Safety: Low Risk  (10/3/2024)    Interpersonal Safety      Do you feel physically and emotionally safe where you currently live?: Yes      Within the past 12 months, have you been hit, slapped, kicked or otherwise physically hurt by someone?: No      Within the past 12 months, have you been humiliated or emotionally abused in other ways by your partner or ex-partner?: No   Housing Stability: Low Risk  (9/28/2024)    Housing Stability      Do you have housing? : Yes      Are you worried about losing your housing?: No       Outpatient Encounter Medications as of 2/25/2025   Medication Sig Dispense Refill     amLODIPine (NORVASC) 5 MG tablet Take 0.5 tablets (2.5 mg) by mouth daily. 45 tablet 3     Calcium 200 MG TABS Take by mouth daily       CLARITIN 10 MG OR TABS 1 TABLET DAILY PRN       fluticasone (FLONASE) 50 MCG/ACT nasal spray Spray 2 sprays into both nostrils daily. Prn   1 Package 11     MULTIPLE VITAMIN OR TABS 1 TABLET DAILY       No facility-administered encounter medications on file as of 2/25/2025.             O:   NAD, WDWN, Alert & Oriented, Mood & Affect wnl, Vitals stable   General appearance normal   Vitals stable   Alert, oriented and in no acute distress     R zygoma 3mm scaly papule       Eyes: Conjunctivae/lids:Normal     ENT: Lips, mucosa: normal    MSK:Normal    Cardiovascular: peripheral edema none    Pulm: Breathing Normal    Neuro/Psych: Orientation:Alert and Orientedx3 ; Mood/Affect:normal       MICRO:   R zygoma:There is hyperkeratosis and focal parakeratosis of the epidermis, overlying atypical keratinocytes,  by areas of orthokeratosis, there are scattered basal atypical keratinocytes: with varying degrees of  overlying loss of maturation, hyperchromatism, pleomorphism, increased and abnormal mitoses, dyskeratosis.  The dermis shows a variable inflammatory infiltrate.     A/P:  R cheek r/o squamous cell carcinoma   TANGENTIAL BIOPSY IN HOUSE:  After consent, anesthesia with LEC and prep, tangential excision performed and dx above confirmed with frozen section histology.  No complications and routine wound care.  Patient is not on  anticoagulants and risk of bleeding discussed with patient.       I have personally reviewed all specimens and/or slides and used them with my medical judgement to determine or confirm the final diagnosis.     Patient told result actinic keratosis schedule cryo .      It was a pleasure speaking to Jena Kamara today.  Previous clinic notes and pertinent laboratory tests were reviewed prior to Jena Kamara's visit.  Patient encouraged to perform monthly skin exams.  UV precautions reviewed with patient.  Return to clinic next appt      Again, thank you for allowing me to participate in the care of your patient.        Sincerely,        Prince Garduno MD    Electronically signed

## 2025-02-25 NOTE — PROGRESS NOTES
Jena Kamara is an extremely pleasant 59 year old year old female patient here today for spot on right cheek.   .   Patient states this has been present for a while.  Patient reports the following symptoms:  growing.  Patient reports the following previous treatments none.  These treatments did not work.  Patient reports the following modifying factors none.  Associated symptoms: none.  Patient has no other skin complaints today.  Remainder of the HPI, Meds, PMH, Allergies, FH, and SH was reviewed in chart.    Pertinent Hx:     Past Medical History:   Diagnosis Date    Abnormal Pap smear of cervix ?    level of abnormality??    Basal cell carcinoma     Optic atrophy, unspecified     Optical Atrophy       Past Surgical History:   Procedure Laterality Date    COLONOSCOPY N/A 2023    Procedure: Colonoscopy;  Surgeon: Andrea Rayo MD;  Location: [facility]    SURGICAL HISTORY OF -   1985    Sinus Surgery    SURGICAL HISTORY OF -   1993            Family History   Problem Relation Age of Onset    Cerebrovascular Disease Mother         age 49    Hypertension Mother     Lipids Father     Respiratory Father         COPD    Eye Disorder Father     Alzheimer Disease Father         age 76    Eye Disorder Sister         optical atrophy    Depression Maternal Grandmother         suicide    Cerebrovascular Disease Maternal Grandfather     Respiratory Paternal Grandfather     Diabetes No family hx of     C.A.D. No family hx of     Breast Cancer No family hx of     Cancer - colorectal No family hx of     Prostate Cancer No family hx of        Social History     Socioeconomic History    Marital status:      Spouse name: Not on file    Number of children: Not on file    Years of education: Not on file    Highest education level: Not on file   Occupational History    Not on file   Tobacco Use    Smoking status: Never    Smokeless tobacco: Never   Vaping Use    Vaping status: Never Used   Substance  and Sexual Activity    Alcohol use: Yes     Comment: 1 drink per month    Drug use: No    Sexual activity: Yes     Partners: Male     Birth control/protection: Surgical     Comment: vasectomy   Other Topics Concern    Parent/sibling w/ CABG, MI or angioplasty before 65F 55M? No   Social History Narrative    Not on file     Social Drivers of Health     Financial Resource Strain: Low Risk  (9/28/2024)    Financial Resource Strain     Within the past 12 months, have you or your family members you live with been unable to get utilities (heat, electricity) when it was really needed?: No   Food Insecurity: Low Risk  (9/28/2024)    Food Insecurity     Within the past 12 months, did you worry that your food would run out before you got money to buy more?: No     Within the past 12 months, did the food you bought just not last and you didn t have money to get more?: No   Transportation Needs: Low Risk  (9/28/2024)    Transportation Needs     Within the past 12 months, has lack of transportation kept you from medical appointments, getting your medicines, non-medical meetings or appointments, work, or from getting things that you need?: No   Physical Activity: Sufficiently Active (9/28/2024)    Exercise Vital Sign     Days of Exercise per Week: 6 days     Minutes of Exercise per Session: 50 min   Stress: No Stress Concern Present (9/28/2024)    [employer] - Occupational Stress Questionnaire     Feeling of Stress : Only a little   Social Connections: Unknown (9/28/2024)    Social Connection and Isolation Panel [NHANES]     Frequency of Communication with Friends and Family: Not on file     Frequency of Social Gatherings with Friends and Family: More than three times a week     Attends Judaism Services: Not on file     Active Member of Clubs or Organizations: Not on file     Attends Club or Organization Meetings: Not on file     Marital Status: Not on file   Interpersonal Safety: Low Risk   (10/3/2024)    Interpersonal Safety     Do you feel physically and emotionally safe where you currently live?: Yes     Within the past 12 months, have you been hit, slapped, kicked or otherwise physically hurt by someone?: No     Within the past 12 months, have you been humiliated or emotionally abused in other ways by your partner or ex-partner?: No   Housing Stability: Low Risk  (9/28/2024)    Housing Stability     Do you have housing? : Yes     Are you worried about losing your housing?: No       Outpatient Encounter Medications as of 2/25/2025   Medication Sig Dispense Refill    amLODIPine (NORVASC) 5 MG tablet Take 0.5 tablets (2.5 mg) by mouth daily. 45 tablet 3    Calcium 200 MG TABS Take by mouth daily      CLARITIN 10 MG OR TABS 1 TABLET DAILY PRN      fluticasone (FLONASE) 50 MCG/ACT nasal spray Spray 2 sprays into both nostrils daily. Prn   1 Package 11    MULTIPLE VITAMIN OR TABS 1 TABLET DAILY       No facility-administered encounter medications on file as of 2/25/2025.             O:   NAD, WDWN, Alert & Oriented, Mood & Affect wnl, Vitals stable   General appearance normal   Vitals stable   Alert, oriented and in no acute distress     R zygoma 3mm scaly papule       Eyes: Conjunctivae/lids:Normal     ENT: Lips, mucosa: normal    MSK:Normal    Cardiovascular: peripheral edema none    Pulm: Breathing Normal    Neuro/Psych: Orientation:Alert and Orientedx3 ; Mood/Affect:normal       MICRO:   R zygoma:There is hyperkeratosis and focal parakeratosis of the epidermis, overlying atypical keratinocytes,  by areas of orthokeratosis, there are scattered basal atypical keratinocytes: with varying degrees of overlying loss of maturation, hyperchromatism, pleomorphism, increased and abnormal mitoses, dyskeratosis.  The dermis shows a variable inflammatory infiltrate.     A/P:  R cheek r/o squamous cell carcinoma   TANGENTIAL BIOPSY IN HOUSE:  After consent, anesthesia with LEC and prep, tangential  excision performed and dx above confirmed with frozen section histology.  No complications and routine wound care.  Patient is not on  anticoagulants and risk of bleeding discussed with patient.       I have personally reviewed all specimens and/or slides and used them with my medical judgement to determine or confirm the final diagnosis.     Patient told result actinic keratosis schedule cryo .      It was a pleasure speaking to Jena Kamara today.  Previous clinic notes and pertinent laboratory tests were reviewed prior to Jena Kamara's visit.  Patient encouraged to perform monthly skin exams.  UV precautions reviewed with patient.  Return to clinic next appt

## 2025-02-25 NOTE — TELEPHONE ENCOUNTER
----- Message from Prince Garduno sent at 2/25/2025  9:57 AM CST -----  R cheek actinic keratosis schedule cryo

## 2025-03-26 ENCOUNTER — OFFICE VISIT (OUTPATIENT)
Dept: DERMATOLOGY | Facility: CLINIC | Age: 60
End: 2025-03-26
Payer: COMMERCIAL

## 2025-03-26 DIAGNOSIS — L57.0 AK (ACTINIC KERATOSIS): Primary | ICD-10-CM

## 2025-03-26 PROCEDURE — 17000 DESTRUCT PREMALG LESION: CPT | Performed by: DERMATOLOGY

## 2025-03-26 NOTE — PATIENT INSTRUCTIONS
WOUND CARE INSTRUCTIONS   FOR CRYOSURGERY   This area treated with liquid nitrogen should form a blister (areas treated may or may not blister-skin may just turn dark and slough off). You do not need to bandage the area unless a blister forms and breaks (which may be a few days). When the blister breaks, begin daily dressing changes as follows:  1) Clean and dry the area with tap water using clean Q-tip or sterile gauze pad.   2) Apply Polysporin ointment or Bacitracin ointment over entire wound. Do NOT use Neosporin ointment.   3) Cover the wound with a band-aid or sterile non-stick gauze pad and micropore paper tape.   REPEAT THESE INSTRUCTIONS AT LEAST ONCE A DAY UNTIL THE WOUND HAS COMPLETELY HEALED.   It is an old wives tale that a wound heals better when it is exposed to air and allowed to dry out. The wound will heal faster with a better cosmetic result if it is kept moist with ointment and covered with a bandage.   Do not let the wound dry out.   IMPORTANT INFORMATION ON REVERSE SIDE   Supplies Needed:   *Cotton tipped applicators (Q-tips)   *Polysporin ointment or Bacitracin ointment (NOT NEOSPORIN)   *Band-aids, or non stick gauze pads and micropore paper tape   PATIENT INFORMATION   During the healing process you will notice a number of changes. All wounds develop a small halo of redness surrounding the wound. This means healing is occurring. Severe itching with extensive redness usually indicates sensitivity to the ointment or bandage tape used to dress the wound. You should call our office if this develops.   Swelling and/or discoloration around your surgical site is common, particularly when performed around the eye.   All wounds normally drain. The larger the wound the more drainage there will be. After 7-10 days, you will notice the wound beginning to shrink and new skin will begin to grow. The wound is healed when you can see skin has formed over the entire area. A healed wound has a healthy,  shiny look to the surface and is red to dark pink in color to normalize. Wounds may take approximately 4-6 weeks to heal. Larger wounds may take 6-8 weeks. After the wound is healed you may discontinue dressing changes.   You may experience a sensation of tightness as your wound heals. This is normal and will gradually subside.   Your healed wound may be sensitive to temperature changes. This sensitivity improves with time, but if you re having a lot of discomfort, try to avoid temperature extremes.   Patients frequently experience itching after their wound appears to have healed because of the continue healing under the skin. Plain Vaseline will help relieve the itching.          Proper skin care from [facility]:    -Eliminate harsh soaps as they strip the natural oils from the skin, often resulting in dry itchy skin ( i.e. Dial, Zest, [place])  -Use mild soaps such as Cetaphil or Dove Sensitive Skin in the shower. You do not need to use soap on arms, legs, and trunk every time you shower unless visibly soiled.   -Avoid hot or cold showers.  -After showering, lightly dry off and apply moisturizing within 2-3 minutes. This will help trap moisture in the skin.   -Aggressive use of a moisturizer at least 1-2 times a day to the entire body (including -Vanicream, Cetaphil, Aquaphor or Cerave) and moisturize hands after every washing.  -We recommend using moisturizers that come in a tub that needs to be scooped out, not a pump. This has more of an oil base. It will hold moisture in your skin much better than a water base moisturizer. The above recommended are non-pore clogging.      Wear a sunscreen with at least SPF 30 on your face, ears, neck and V of the chest daily. Wear sunscreen on other areas of the body if those areas are exposed to the sun throughout the day. Sunscreens can contain physical and/or chemical blockers. Physical blockers are less likely to clog pores, these include zinc oxide and  titanium dioxide. Reapply every two hour and after swimming.     Sunscreen examples: https://www.ewg.org/sunscreen/    UV radiation  UVA radiation remains constant throughout the day and throughout the year. It is a longer wavelength than UVB and therefore penetrates deeper into the skin leading to immediate and delayed tanning, photoaging, and skin cancer. 70-80% of UVA and UVB radiation occurs between the hours of 10am-2pm.  UVB radiation  UVB radiation causes the most harmful effects and is more significant during the summer months. However, snow and ice can reflect UVB radiation leading to skin damage during the winter months as well. UVB radiation is responsible for tanning, burning, inflammation, delayed erythema (pinkness), pigmentation (brown spots), and skin cancer.     I recommend self monthly full body exams and yearly full body exams with a dermatology provider. If you develop a new or changing lesion please follow up for examination. Most skin cancers are pink and scaly or pink and pearly. However, we do see blue/brown/black skin cancers.  Consider the ABCDEs of melanoma when giving yourself your monthly full body exam ( don't forget the groin, buttocks, feet, toes, etc). A-asymmetry, B-borders, C-color, D-diameter, E-elevation or evolving. If you see any of these changes please follow up in clinic. If you cannot see your back I recommend purchasing a hand held mirror to use with a larger wall mirror.       Checking for Skin Cancer  You can find cancer early by checking your skin each month. There are 3 kinds of skin cancer. They are melanoma, basal cell carcinoma, and squamous cell carcinoma. Doing monthly skin checks is the best way to find new marks or skin changes. Follow the instructions below for checking your skin.   The ABCDEs of checking moles for melanoma   Check your moles or growths for signs of melanoma using ABCDE:   Asymmetry: the sides of the mole or growth don t match  Border: the edges  are ragged, notched, or blurred  Color: the color within the mole or growth varies  Diameter: the mole or growth is larger than 6 mm (size of a pencil eraser)  Evolving: the size, shape, or color of the mole or growth is changing (evolving is not shown in the images below)    Checking for other types of skin cancer  Basal cell carcinoma or squamous cell carcinoma have symptoms such as:     A spot or mole that looks different from all other marks on your skin  Changes in how an area feels, such as itching, tenderness, or pain  Changes in the skin's surface, such as oozing, bleeding, or scaliness  A sore that does not heal  New swelling or redness beyond the border of a mole    Who s at risk?  Anyone can get skin cancer. But you are at greater risk if you have:   Fair skin, light-colored hair, or light-colored eyes  Many moles or abnormal moles on your skin  A history of sunburns from sunlight or tanning beds  A family history of skin cancer  A history of exposure to radiation or chemicals  A weakened immune system  If you have had skin cancer in the past, you are at risk for recurring skin cancer.   How to check your skin  Do your monthly skin checkups in front of a full-length mirror. Check all parts of your body, including your:   Head (ears, face, neck, and scalp)  Torso (front, back, and sides)  Arms (tops, undersides, upper, and lower armpits)  Hands (palms, backs, and fingers, including under the nails)  Buttocks and genitals  Legs (front, back, and sides)  Feet (tops, soles, toes, including under the nails, and between toes)  If you have a lot of moles, take digital photos of them each month. Make sure to take photos both up close and from a distance. These can help you see if any moles change over time.   Most skin changes are not cancer. But if you see any changes in your skin, call your doctor right away. Only he or she can diagnose a problem. If you have skin cancer, seeing your doctor can be the first  step toward getting the treatment that could save your life.   [employer] last reviewed this educational content on 4/1/2019 2000-2020 [employer]. [address] [place] [address], [place] [address]. All rights reserved. This information is not intended as a substitute for professional medical care. Always follow your healthcare professional's instructions.       When should I call my doctor?  If you are worsening or not improving, please, contact us or seek urgent care as noted below.     Who should I call with questions (adults)?    [facility] [phone]  For urgent needs outside of business hours call the [facility] at [phone] and ask for the dermatology resident on call to be paged  If this is a medical emergency and you are unable to reach an ER, Call [phone]      If you need a prescription refill, please contact your pharmacy. Refills are approved or denied by our Physicians during normal business hours, Monday through Friday.  Per office policy, refills will not be granted if you have not been seen within the past year (or sooner depending on the condition).

## 2025-03-26 NOTE — PROGRESS NOTES
=Jena Kamara is an extremely pleasant 59 year old year old female patient here today for evaluation and managment of actinic keratosis on right cheek.  .  Patient has no other skin complaints today.  Remainder of the HPI, Meds, PMH, Allergies, FH, and SH was reviewed in chart.      Past Medical History:   Diagnosis Date    Abnormal Pap smear of cervix ?    level of abnormality??    Basal cell carcinoma     Optic atrophy, unspecified     Optical Atrophy       Past Surgical History:   Procedure Laterality Date    COLONOSCOPY N/A 2023    Procedure: Colonoscopy;  Surgeon: Andrea Rayo MD;  Location: [facility]    SURGICAL HISTORY OF -   1985    Sinus Surgery    SURGICAL HISTORY OF -   1993            Family History   Problem Relation Age of Onset    Cerebrovascular Disease Mother         age 49    Hypertension Mother     Lipids Father     Respiratory Father         COPD    Eye Disorder Father     Alzheimer Disease Father         age 76    Eye Disorder Sister         optical atrophy    Depression Maternal Grandmother         suicide    Cerebrovascular Disease Maternal Grandfather     Respiratory Paternal Grandfather     Diabetes No family hx of     C.A.D. No family hx of     Breast Cancer No family hx of     Cancer - colorectal No family hx of     Prostate Cancer No family hx of        Social History     Socioeconomic History    Marital status:      Spouse name: Not on file    Number of children: Not on file    Years of education: Not on file    Highest education level: Not on file   Occupational History    Not on file   Tobacco Use    Smoking status: Never    Smokeless tobacco: Never   Vaping Use    Vaping status: Never Used   Substance and Sexual Activity    Alcohol use: Yes     Comment: 1 drink per month    Drug use: No    Sexual activity: Yes     Partners: Male     Birth control/protection: Surgical     Comment: vasectomy   Other Topics Concern    Parent/sibling w/ CABG, MI or  angioplasty before 65F 55M? No   Social History Narrative    Not on file     Social Drivers of Health     Financial Resource Strain: Low Risk  (9/28/2024)    Financial Resource Strain     Within the past 12 months, have you or your family members you live with been unable to get utilities (heat, electricity) when it was really needed?: No   Food Insecurity: Low Risk  (9/28/2024)    Food Insecurity     Within the past 12 months, did you worry that your food would run out before you got money to buy more?: No     Within the past 12 months, did the food you bought just not last and you didn t have money to get more?: No   Transportation Needs: Low Risk  (9/28/2024)    Transportation Needs     Within the past 12 months, has lack of transportation kept you from medical appointments, getting your medicines, non-medical meetings or appointments, work, or from getting things that you need?: No   Physical Activity: Sufficiently Active (9/28/2024)    Exercise Vital Sign     Days of Exercise per Week: 6 days     Minutes of Exercise per Session: 50 min   Stress: No Stress Concern Present (9/28/2024)    [employer] - Occupational Stress Questionnaire     Feeling of Stress : Only a little   Social Connections: Unknown (9/28/2024)    Social Connection and Isolation Panel [NHANES]     Frequency of Communication with Friends and Family: Not on file     Frequency of Social Gatherings with Friends and Family: More than three times a week     Attends Evangelical Services: Not on file     Active Member of Clubs or Organizations: Not on file     Attends Club or Organization Meetings: Not on file     Marital Status: Not on file   Interpersonal Safety: Low Risk  (10/3/2024)    Interpersonal Safety     Do you feel physically and emotionally safe where you currently live?: Yes     Within the past 12 months, have you been hit, slapped, kicked or otherwise physically hurt by someone?: No     Within the past 12  months, have you been humiliated or emotionally abused in other ways by your partner or ex-partner?: No   Housing Stability: Low Risk  (9/28/2024)    Housing Stability     Do you have housing? : Yes     Are you worried about losing your housing?: No       Outpatient Encounter Medications as of 3/26/2025   Medication Sig Dispense Refill    amLODIPine (NORVASC) 5 MG tablet Take 0.5 tablets (2.5 mg) by mouth daily. 45 tablet 3    Calcium 200 MG TABS Take by mouth daily      CLARITIN 10 MG OR TABS 1 TABLET DAILY PRN      fluticasone (FLONASE) 50 MCG/ACT nasal spray Spray 2 sprays into both nostrils daily. Prn   1 Package 11    MULTIPLE VITAMIN OR TABS 1 TABLET DAILY       No facility-administered encounter medications on file as of 3/26/2025.             O:   NAD, WDWN, Alert & Oriented, Mood & Affect wnl, Vitals stable   General appearance normal   Vitals stable   Alert, oriented and in no acute distress     R cheek gritty scaly papule       Eyes: Conjunctivae/lids:Normal     ENT: Lips, mucosa: normal    MSK:Normal    Cardiovascular: peripheral edema none    Pulm: Breathing Normal    Neuro/Psych: Orientation:Alert and Orientedx3 ; Mood/Affect:normal       A/P:  Actinic keratosis   Pathophysiology discussed with pateint   LN2:  Treated with LN2 for 5s for 1-2 cycles. Warned risks of blistering, pain, pigment change, scarring, and incomplete resolution.  Advised patient to return if lesions do not completely resolve.  Wound care sheet given.  It was a pleasure speaking to Jena Kamara today.  Previous clinic notes and pertinent laboratory tests were reviewed prior to Jena Kamara's visit.  Patient encouraged to perform monthly skin exams.  UV precautions reviewed with patient.  Return to clinic 12 months

## 2025-03-26 NOTE — LETTER
3/26/2025      Jena Kamara  [address] [facility] [address]      Dear Colleague,    Thank you for referring your patient, Jena Kamara, to the [facility]. Please see a copy of my visit note below.    =Jena Kamara is an extremely pleasant 59 year old year old female patient here today for evaluation and managment of actinic keratosis on right cheek.  .  Patient has no other skin complaints today.  Remainder of the HPI, Meds, PMH, Allergies, FH, and SH was reviewed in chart.      Past Medical History:   Diagnosis Date     Abnormal Pap smear of cervix ?    level of abnormality??     Basal cell carcinoma      Optic atrophy, unspecified     Optical Atrophy       Past Surgical History:   Procedure Laterality Date     COLONOSCOPY N/A 2023    Procedure: Colonoscopy;  Surgeon: Andrea Rayo MD;  Location: [facility]     SURGICAL HISTORY OF -   1985    Sinus Surgery     SURGICAL HISTORY OF -   1993            Family History   Problem Relation Age of Onset     Cerebrovascular Disease Mother         age 49     Hypertension Mother      Lipids Father      Respiratory Father         COPD     Eye Disorder Father      Alzheimer Disease Father         age 76     Eye Disorder Sister         optical atrophy     Depression Maternal Grandmother         suicide     Cerebrovascular Disease Maternal Grandfather      Respiratory Paternal Grandfather      Diabetes No family hx of      C.A.D. No family hx of      Breast Cancer No family hx of      Cancer - colorectal No family hx of      Prostate Cancer No family hx of        Social History     Socioeconomic History     Marital status:      Spouse name: Not on file     Number of children: Not on file     Years of education: Not on file     Highest education level: Not on file   Occupational History     Not on file   Tobacco Use     Smoking status: Never     Smokeless tobacco: Never   Vaping Use     Vaping status: Never  Used   Substance and Sexual Activity     Alcohol use: Yes     Comment: 1 drink per month     Drug use: No     Sexual activity: Yes     Partners: Male     Birth control/protection: Surgical     Comment: vasectomy   Other Topics Concern     Parent/sibling w/ CABG, MI or angioplasty before 65F 55M? No   Social History Narrative     Not on file     Social Drivers of Health     Financial Resource Strain: Low Risk  (9/28/2024)    Financial Resource Strain      Within the past 12 months, have you or your family members you live with been unable to get utilities (heat, electricity) when it was really needed?: No   Food Insecurity: Low Risk  (9/28/2024)    Food Insecurity      Within the past 12 months, did you worry that your food would run out before you got money to buy more?: No      Within the past 12 months, did the food you bought just not last and you didn t have money to get more?: No   Transportation Needs: Low Risk  (9/28/2024)    Transportation Needs      Within the past 12 months, has lack of transportation kept you from medical appointments, getting your medicines, non-medical meetings or appointments, work, or from getting things that you need?: No   Physical Activity: Sufficiently Active (9/28/2024)    Exercise Vital Sign      Days of Exercise per Week: 6 days      Minutes of Exercise per Session: 50 min   Stress: No Stress Concern Present (9/28/2024)    [employer] [place] [employer] - Occupational Stress Questionnaire      Feeling of Stress : Only a little   Social Connections: Unknown (9/28/2024)    Social Connection and Isolation Panel [NHANES]      Frequency of Communication with Friends and Family: Not on file      Frequency of Social Gatherings with Friends and Family: More than three times a week      Attends Zoroastrianism Services: Not on file      Active Member of Clubs or Organizations: Not on file      Attends Club or Organization Meetings: Not on file      Marital Status: Not on file    Interpersonal Safety: Low Risk  (10/3/2024)    Interpersonal Safety      Do you feel physically and emotionally safe where you currently live?: Yes      Within the past 12 months, have you been hit, slapped, kicked or otherwise physically hurt by someone?: No      Within the past 12 months, have you been humiliated or emotionally abused in other ways by your partner or ex-partner?: No   Housing Stability: Low Risk  (9/28/2024)    Housing Stability      Do you have housing? : Yes      Are you worried about losing your housing?: No       Outpatient Encounter Medications as of 3/26/2025   Medication Sig Dispense Refill     amLODIPine (NORVASC) 5 MG tablet Take 0.5 tablets (2.5 mg) by mouth daily. 45 tablet 3     Calcium 200 MG TABS Take by mouth daily       CLARITIN 10 MG OR TABS 1 TABLET DAILY PRN       fluticasone (FLONASE) 50 MCG/ACT nasal spray Spray 2 sprays into both nostrils daily. Prn   1 Package 11     MULTIPLE VITAMIN OR TABS 1 TABLET DAILY       No facility-administered encounter medications on file as of 3/26/2025.             O:   NAD, WDWN, Alert & Oriented, Mood & Affect wnl, Vitals stable   General appearance normal   Vitals stable   Alert, oriented and in no acute distress     R cheek gritty scaly papule       Eyes: Conjunctivae/lids:Normal     ENT: Lips, mucosa: normal    MSK:Normal    Cardiovascular: peripheral edema none    Pulm: Breathing Normal    Neuro/Psych: Orientation:Alert and Orientedx3 ; Mood/Affect:normal       A/P:  Actinic keratosis   Pathophysiology discussed with pateint   LN2:  Treated with LN2 for 5s for 1-2 cycles. Warned risks of blistering, pain, pigment change, scarring, and incomplete resolution.  Advised patient to return if lesions do not completely resolve.  Wound care sheet given.  It was a pleasure speaking to Jena Kamara today.  Previous clinic notes and pertinent laboratory tests were reviewed prior to Jena Kamara's visit.  Patient encouraged to perform monthly skin  exams.  UV precautions reviewed with patient.  Return to clinic 12 months      Again, thank you for allowing me to participate in the care of your patient.        Sincerely,        Prince Garduno MD    Electronically signed

## 2025-04-16 ENCOUNTER — THERAPY VISIT (OUTPATIENT)
Dept: PHYSICAL THERAPY | Facility: CLINIC | Age: 60
End: 2025-04-16
Payer: COMMERCIAL

## 2025-04-16 DIAGNOSIS — M54.2 CERVICAL PAIN: ICD-10-CM

## 2025-04-16 DIAGNOSIS — M54.12 CERVICAL RADICULOPATHY: Primary | ICD-10-CM

## 2025-04-16 PROCEDURE — 97110 THERAPEUTIC EXERCISES: CPT | Mod: GP | Performed by: PHYSICAL THERAPIST

## 2025-04-16 NOTE — PROGRESS NOTES
PLAN  Continue therapy per current plan of care.    Beginning/End Dates of Progress Note Reporting Period:  01/24/25 to 04/16/2025    Referring Provider:  Kary Anderson       04/16/25 0500   Appointment Info   Signing clinician's name / credentials Cristy Perez, PT, DPT   Visits Used 11   Medical Diagnosis Cervical radiculopathy,Cervical pain   PT Tx Diagnosis Neck pain   Progress Note/Certification   Onset of illness/injury or Date of Surgery 10/03/24   Therapy Frequency 1x week   Predicted Duration 6 weeks   Progress Note Due Date 03/07/25   Progress Note Completed Date 01/24/25   GOALS   PT Goals 2;3;4   PT Goal 1   Goal Identifier LTG   Goal Description Pt will be able to cook >1 hour without tingling occurring in BUE   Goal Progress LUE tingling present with certain movements   Target Date 12/18/24   PT Goal 2   Goal Identifier LTG   Goal Description Pt will demonstrate >50 degrees cervical rotation in order to drive with ease   Goal Progress ROM still WFL   Target Date 12/18/24   Date Met 11/06/24   PT Goal 3   Goal Identifier LTG   Goal Description Pt will demonstrate 5/5 middle trap strength in order to improve posture while sitting at work for 8 hours   Goal Progress weakness still noted on L>R   Target Date 12/18/24   PT Goal 4   Goal Identifier STG   Goal Description Pt will demonstrate DNF head lift >10 seconds without shaking in order to improve stability while lifting objects   Target Date 11/20/24   Date Met 12/11/24   Subjective Report   Subjective Report Returns to PT after trialing traction at home. Concerned as tingling persisted in LUE. This occurs while sitting, laying down and starting to jog.   Objective Measures   Objective Measures Objective Measure 1;Objective Measure 2;Objective Measure 3;Objective Measure 4   Objective Measure 1   Objective Measure Cervical AROM   Details Cervical Rotation L 70*, R 60*   Objective Measure 2   Objective Measure Myotomes   Details L thumb ext: 4+/5, L  hand intrinsics weaker than R, L shoulder abd 5/5   Objective Measure 3   Objective Measure Middle trap MMT   Details 4+/5 L, 5-/5 R   Objective Measure 4   Objective Measure Palpation   Details hypomobile C6-T2 at SP and L facet jts   PT Modalities   PT Modalities Mechanical Traction   Treatment Interventions (PT)   Interventions Therapeutic Procedure/Exercise;Manual Therapy   Therapeutic Procedure/Exercise   Therapeutic Procedures: strength, endurance, ROM, flexibility minutes (98833) 25   Therapeutic Procedures Ther Proc 2   Ther Proc 1 Subj and gathering of info in past couple months   Ther Proc 2 prone on elbows chin tuck x5- increased tingling. seated chin tuck x5 and chin tuck with self OP 5x5 sec holds. VR of rest of exercises to continue with thoracic mobility   Skilled Intervention Improve pain and mobility. Increase strength. Cueing on dosage and proper form   Patient Response/Progress tolerated chin tuck with OP well. Updated POC to continue to assist in LUE tingling and weakness.   Education   Learner/Method Patient;Listening;Demonstration;Pictures/Video;Reading   Plan   Home program papers   Plan for next session Karina ext techniques. MT   Total Session Time   Timed Code Treatment Minutes 25   Total Treatment Time (sum of timed and untimed services) 25

## 2025-04-24 ENCOUNTER — THERAPY VISIT (OUTPATIENT)
Dept: PHYSICAL THERAPY | Facility: CLINIC | Age: 60
End: 2025-04-24
Payer: COMMERCIAL

## 2025-04-24 DIAGNOSIS — M54.12 CERVICAL RADICULOPATHY: Primary | ICD-10-CM

## 2025-04-24 DIAGNOSIS — M54.2 CERVICAL PAIN: ICD-10-CM

## 2025-04-24 PROCEDURE — 97110 THERAPEUTIC EXERCISES: CPT | Mod: GP | Performed by: PHYSICAL THERAPIST

## 2025-04-24 PROCEDURE — 97140 MANUAL THERAPY 1/> REGIONS: CPT | Mod: GP | Performed by: PHYSICAL THERAPIST

## 2025-05-01 ENCOUNTER — THERAPY VISIT (OUTPATIENT)
Dept: PHYSICAL THERAPY | Facility: CLINIC | Age: 60
End: 2025-05-01
Payer: COMMERCIAL

## 2025-05-01 DIAGNOSIS — M54.2 CERVICAL PAIN: ICD-10-CM

## 2025-05-01 DIAGNOSIS — M54.12 CERVICAL RADICULOPATHY: Primary | ICD-10-CM

## 2025-05-01 PROCEDURE — 97110 THERAPEUTIC EXERCISES: CPT | Mod: GP | Performed by: PHYSICAL THERAPIST

## 2025-05-01 PROCEDURE — 97140 MANUAL THERAPY 1/> REGIONS: CPT | Mod: GP | Performed by: PHYSICAL THERAPIST

## 2025-05-29 ENCOUNTER — THERAPY VISIT (OUTPATIENT)
Dept: PHYSICAL THERAPY | Facility: CLINIC | Age: 60
End: 2025-05-29
Payer: COMMERCIAL

## 2025-05-29 DIAGNOSIS — M54.12 CERVICAL RADICULOPATHY: Primary | ICD-10-CM

## 2025-05-29 DIAGNOSIS — M54.2 CERVICAL PAIN: ICD-10-CM

## 2025-05-29 PROCEDURE — 97112 NEUROMUSCULAR REEDUCATION: CPT | Mod: GP | Performed by: PHYSICAL THERAPIST

## 2025-05-29 PROCEDURE — 97110 THERAPEUTIC EXERCISES: CPT | Mod: GP | Performed by: PHYSICAL THERAPIST

## 2025-05-29 NOTE — PROGRESS NOTES
05/29/25 0500   Appointment Info   Signing clinician's name / credentials Kevin Mazariegos, PT, DPT, OCS   Visits Used 14   Medical Diagnosis Cervical radiculopathy,Cervical pain   PT Tx Diagnosis Neck pain   Progress Note/Certification   Onset of illness/injury or Date of Surgery 10/03/24   Therapy Frequency 1x week   Predicted Duration 6 weeks   Progress Note Due Date 05/28/25   Progress Note Completed Date 04/16/25   GOALS   PT Goals 2;3;4   PT Goal 1   Goal Identifier LTG   Goal Description Pt will be able to cook >1 hour without tingling occurring in BUE   Goal Progress Can feel L UE symptoms after 15-20 minutes   Target Date 12/18/24   PT Goal 2   Goal Identifier LTG   Goal Description Pt will demonstrate >50 degrees cervical rotation in order to drive with ease   Goal Progress ROM still WFL   Target Date 12/18/24   Date Met 11/06/24   PT Goal 3   Goal Identifier LTG   Goal Description Pt will demonstrate 5/5 middle trap strength in order to improve posture while sitting at work for 8 hours   Goal Progress Improved   Target Date 12/18/24   Date Met 05/29/25   PT Goal 4   Goal Identifier STG   Goal Description Pt will demonstrate DNF head lift >10 seconds without shaking in order to improve stability while lifting objects   Target Date 11/20/24   Date Met 12/11/24   Subjective Report   Subjective Report Pt reports since starting PT she has had good improvement of R UE symptoms.  Pt reports L UE symptoms are better but still coming and going with symptoms of numbness and tingling into L hand.  Symptoms will improve temporarily with exercises and home traction but always return.   Objective Measures   Objective Measures Objective Measure 1;Objective Measure 2;Objective Measure 3;Objective Measure 4   Objective Measure 1   Objective Measure Cervical AROM   Details Pain with end range flexion   Objective Measure 2   Objective Measure Myotomes   Details L thumb ext: 4+/5, L hand intrinsics weaker than R, L shoulder  abd 5/5   PT Modalities   PT Modalities Mechanical Traction   Treatment Interventions (PT)   Interventions Therapeutic Procedure/Exercise;Manual Therapy;Therapeutic Activity;Neuromuscular Re-education   Therapeutic Procedure/Exercise   Therapeutic Procedures Ther Proc 2   Ther Proc 2 Reviewed chin tucks with extension x 10 with some reduction in numbness/tingling, repeated with no change. Performed chin tuck with R side bend with decreased symptoms, repeated with further decrease in symptoms.  Educated on increasing frequency of performance and monitoring effect of symptoms.   Skilled Intervention See above   Patient Response/Progress Improved cervical ROM, reduced and centralized paresthesia, and improved thumb extension strength   Neuromuscular Re-education   Neuromuscular re-ed of mvmt, balance, coord, kinesthetic sense, posture, proprioception minutes (23103) 25   Neuro Re-ed 1 - Details Educated on neuroanatomy, recommendation to follow up with primary provider to investigate cervical anatomy and nerve anatomy potentially via MRI vs. EMG to determine pertinent anatomy.   Patient Response/Progress Demonstrated understanding.   Education   Learner/Method Patient;Listening;Demonstration;Pictures/Video;Reading   Plan   Home program papers   Plan for next session Hold, resume after follow up with provider.   Total Session Time   Timed Code Treatment Minutes 25   Total Treatment Time (sum of timed and untimed services) 25         PLAN  Follow up with primary care provider to discussed further investigation of L sided radicular symptoms.  Resume PT in about a month.      Beginning/End Dates of Progress Note Reporting Period:  04/16/25 to 05/29/2025    Referring Provider:  Kary Anderson

## 2025-07-17 ENCOUNTER — OFFICE VISIT (OUTPATIENT)
Dept: FAMILY MEDICINE | Facility: CLINIC | Age: 60
End: 2025-07-17
Payer: COMMERCIAL

## 2025-07-17 VITALS
WEIGHT: 177.7 LBS | HEIGHT: 64 IN | OXYGEN SATURATION: 97 % | SYSTOLIC BLOOD PRESSURE: 134 MMHG | HEART RATE: 73 BPM | RESPIRATION RATE: 16 BRPM | DIASTOLIC BLOOD PRESSURE: 82 MMHG | BODY MASS INDEX: 30.34 KG/M2 | TEMPERATURE: 98 F

## 2025-07-17 DIAGNOSIS — I10 HYPERTENSION GOAL BP (BLOOD PRESSURE) < 140/90: ICD-10-CM

## 2025-07-17 DIAGNOSIS — E78.5 HYPERLIPIDEMIA LDL GOAL <130: ICD-10-CM

## 2025-07-17 DIAGNOSIS — R20.0 SADDLE ANESTHESIA: ICD-10-CM

## 2025-07-17 DIAGNOSIS — M54.12 CERVICAL RADICULOPATHY: Primary | ICD-10-CM

## 2025-07-17 ASSESSMENT — ENCOUNTER SYMPTOMS: BACK PAIN: 1

## 2025-07-17 NOTE — PROGRESS NOTES
"  Assessment & Plan     (M54.12) Cervical radiculopathy  (primary encounter diagnosis)  Comment: Given ongoing symptoms of numbness that is persistent and reported weakness, I believe we need a cervical MRI to get more information. Discussed with her. Order in. The patient indicates understanding of these issues and agrees with the plan.  Plan: MR Cervical Spine w/o Contrast            (R20.0) Saddle anesthesia  Comment: mild and only sensory. New. Unclear etiology. Will assess central cord with cervical MRI. If persists, may need brain mri. The patient indicates understanding of these issues and agrees with the plan.   Plan:     (E78.5) Hyperlipidemia LDL goal <130  Comment: future order   Plan: Lipid panel reflex to direct LDL Fasting            (I10) Hypertension goal BP (blood pressure) < 140/90  Comment: better at home   Plan:     BMI  Estimated body mass index is 30.5 kg/m  as calculated from the following:    Height as of this encounter: 1.626 m (5' 4\").    Weight as of this encounter: 80.6 kg (177 lb 11.2 oz).   Weight management plan: Discussed healthy diet and exercise guidelines        Gianni Bella is a 59 year old, presenting for the following health issues:  Back Pain        7/17/2025     7:43 AM   Additional Questions   Roomed by MUKESH Monreal   Accompanied by self     Back Pain     History of Present Illness       Back Pain:  She presents for follow up of back pain. Patient's back pain is a recurring problem.           She eats 2-3 servings of fruits and vegetables daily.She consumes 0 sweetened beverage(s) daily.She exercises with enough effort to increase her heart rate 30 to 60 minutes per day.  She exercises with enough effort to increase her heart rate 6 days per week.          [facility] Back Pain Main    Question 7/17/2025  7:41 AM CDT - Filed by Patient   Your back pain is recurring     [facility] Chronic Recurring Back Pain    Question 7/17/2025  7:41 AM CDT - Filed by Patient   Where is your " "back pain located? right upper back    left upper back   How would you describe your back pain? other   Where does your back pain spread? left shoulder    left side of neck   Since you noticed your back pain, how has it changed? always present, but gets better and worse   Does your back pain interfere with your job? Yes   Since your last visit, have you tried any new treatment? Yes   If yes, which: Physical Therapy     Numbness & tingling in arms, PT has helped resolve right side but left side symptoms are still present.  Was doing PT 1x/ week since October.   Would like imaging prior to continuing PT.   Has been doing PT for months  Posture is better and right shoulder is better since starting PT     Location : upper back pain and shoulder pain - with certain activities   Numbness in the left 5th digit and all the way down the back of her arm - some loss of strength in her hand   Occasional numbness in the saddle region   No numbness in the legs   Right side is ok     Considered breast reduction surgery but had insurance issues   Work is on the computer     Review of systems:   Occasional headaches  No vision changes     Exercise - walks daily or uses treadmill     Hypertension   On amlodipine 2.5 mg/day  - if she takes a higher dose of this, she gets dizzy   Home blood pressure  : 119/70, 126/78  Reports white coat hypertension     Cholesterol has been elevated   The 10-year ASCVD risk score (Lul BAINS, et al., 2019) is: 4.2%    Values used to calculate the score:      Age: 59 years      Sex: Female      Is Non- : No      Diabetic: No      Tobacco smoker: No      Systolic Blood Pressure: 136 mmHg      Is BP treated: Yes      HDL Cholesterol: 54 mg/dL      Total Cholesterol: 226 mg/dL        Objective    /86   Pulse 73   Temp 98  F (36.7  C) (Tympanic)   Resp 16   Ht 1.626 m (5' 4\")   Wt 80.6 kg (177 lb 11.2 oz)   LMP  (LMP Unknown)   SpO2 97%   BMI 30.50 kg/m    Body mass " index is 30.5 kg/m .  Physical Exam   GENERAL - Pt is alert and oriented in no acute distress.  Affect is appropriate. Good eye contact.  NECK - Neck is supple w/o LA or thyromegaly  RESPIRATORY - Clear to auscultation bilaterally.  No wheezing noted  CV - RRR, no murmurs, rubs, gallops. No carotid bruits. 2+peripheral pulses  EXTREM - No edema.  NEURO- Reflexes 2+ and equal. Sensation intact. CN II-XII intact.   UE strength intact   Back shows full extension, flexion, and lateral bending. Negative straight leg raising test. Hips show full ROM  Normal gait and station         The longitudinal plan of care for the diagnosis(es)/condition(s) as documented were addressed during this visit. Due to the added complexity in care, I will continue to support Jena in the subsequent management and with ongoing continuity of care.      Signed Electronically by: Monik Fraga MD

## 2025-07-25 ENCOUNTER — HOSPITAL ENCOUNTER (OUTPATIENT)
Dept: MRI IMAGING | Facility: CLINIC | Age: 60
Discharge: HOME | End: 2025-07-25
Attending: FAMILY MEDICINE | Admitting: FAMILY MEDICINE
Payer: COMMERCIAL

## 2025-07-25 DIAGNOSIS — M54.12 CERVICAL RADICULOPATHY: ICD-10-CM

## 2025-07-25 PROCEDURE — 72141 MRI NECK SPINE W/O DYE: CPT

## 2025-07-30 ENCOUNTER — RESULTS FOLLOW-UP (OUTPATIENT)
Dept: FAMILY MEDICINE | Facility: CLINIC | Age: 60
End: 2025-07-30
Payer: COMMERCIAL

## 2025-07-30 DIAGNOSIS — M48.02 SPINAL STENOSIS IN CERVICAL REGION: Primary | ICD-10-CM

## 2025-07-31 ENCOUNTER — PATIENT OUTREACH (OUTPATIENT)
Dept: CARE COORDINATION | Facility: CLINIC | Age: 60
End: 2025-07-31
Payer: COMMERCIAL

## 2025-09-03 ENCOUNTER — PATIENT OUTREACH (OUTPATIENT)
Dept: CARE COORDINATION | Facility: CLINIC | Age: 60
End: 2025-09-03

## 2025-09-03 ENCOUNTER — OFFICE VISIT (OUTPATIENT)
Dept: PHYSICAL MEDICINE AND REHAB | Facility: CLINIC | Age: 60
End: 2025-09-03
Attending: FAMILY MEDICINE
Payer: COMMERCIAL

## 2025-09-03 VITALS
HEIGHT: 64 IN | BODY MASS INDEX: 30.83 KG/M2 | DIASTOLIC BLOOD PRESSURE: 86 MMHG | WEIGHT: 180.6 LBS | HEART RATE: 75 BPM | SYSTOLIC BLOOD PRESSURE: 181 MMHG

## 2025-09-03 DIAGNOSIS — M54.12 CERVICAL RADICULOPATHY: Primary | ICD-10-CM

## 2025-09-03 DIAGNOSIS — M48.02 SPINAL STENOSIS IN CERVICAL REGION: ICD-10-CM

## 2025-09-03 PROCEDURE — 1125F AMNT PAIN NOTED PAIN PRSNT: CPT | Performed by: PAIN MEDICINE

## 2025-09-03 PROCEDURE — 99204 OFFICE O/P NEW MOD 45 MIN: CPT | Performed by: PAIN MEDICINE

## 2025-09-03 PROCEDURE — 3079F DIAST BP 80-89 MM HG: CPT | Performed by: PAIN MEDICINE

## 2025-09-03 PROCEDURE — 3077F SYST BP >= 140 MM HG: CPT | Performed by: PAIN MEDICINE

## 2025-09-03 RX ORDER — GABAPENTIN 300 MG/1
300 CAPSULE ORAL 3 TIMES DAILY
Qty: 270 CAPSULE | Refills: 1 | Status: SHIPPED | OUTPATIENT
Start: 2025-09-03

## 2025-09-03 ASSESSMENT — PAIN SCALES - GENERAL: PAINLEVEL_OUTOF10: MILD PAIN (3)

## (undated) RX ORDER — CIPROFLOXACIN 500 MG/1
TABLET, FILM COATED ORAL
Status: DISPENSED
Start: 2017-04-13

## (undated) RX ORDER — PROPOFOL 10 MG/ML
INJECTION, EMULSION INTRAVENOUS
Status: DISPENSED
Start: 2023-12-14